# Patient Record
Sex: FEMALE | Race: BLACK OR AFRICAN AMERICAN | Employment: UNEMPLOYED | URBAN - METROPOLITAN AREA
[De-identification: names, ages, dates, MRNs, and addresses within clinical notes are randomized per-mention and may not be internally consistent; named-entity substitution may affect disease eponyms.]

---

## 2019-09-23 ENCOUNTER — OFFICE VISIT (OUTPATIENT)
Dept: FAMILY MEDICINE CLINIC | Age: 74
End: 2019-09-23

## 2019-09-23 VITALS
HEART RATE: 93 BPM | BODY MASS INDEX: 29.37 KG/M2 | TEMPERATURE: 98.2 F | HEIGHT: 64 IN | SYSTOLIC BLOOD PRESSURE: 144 MMHG | DIASTOLIC BLOOD PRESSURE: 69 MMHG | RESPIRATION RATE: 18 BRPM | OXYGEN SATURATION: 96 % | WEIGHT: 172 LBS

## 2019-09-23 DIAGNOSIS — G20 PARKINSON'S DISEASE (HCC): Primary | ICD-10-CM

## 2019-09-23 DIAGNOSIS — E78.5 DYSLIPIDEMIA: ICD-10-CM

## 2019-09-23 DIAGNOSIS — M19.90 ARTHRITIS: ICD-10-CM

## 2019-09-23 DIAGNOSIS — F39 MOOD DISORDER (HCC): ICD-10-CM

## 2019-09-23 DIAGNOSIS — Z00.00 ENCOUNTER FOR MEDICARE ANNUAL WELLNESS EXAM: ICD-10-CM

## 2019-09-23 DIAGNOSIS — I10 ESSENTIAL HYPERTENSION: ICD-10-CM

## 2019-09-23 DIAGNOSIS — Z71.89 ACP (ADVANCE CARE PLANNING): ICD-10-CM

## 2019-09-23 DIAGNOSIS — Z23 ENCOUNTER FOR IMMUNIZATION: ICD-10-CM

## 2019-09-23 DIAGNOSIS — E66.3 OVERWEIGHT (BMI 25.0-29.9): ICD-10-CM

## 2019-09-23 RX ORDER — ATORVASTATIN CALCIUM 10 MG/1
TABLET, FILM COATED ORAL DAILY
COMMUNITY
End: 2020-02-06 | Stop reason: SDUPTHER

## 2019-09-23 RX ORDER — CARBIDOPA AND LEVODOPA 25; 100 MG/1; MG/1
1 TABLET ORAL 3 TIMES DAILY
COMMUNITY
End: 2022-09-20

## 2019-09-23 RX ORDER — HYDROCHLOROTHIAZIDE 25 MG/1
25 TABLET ORAL DAILY
COMMUNITY
End: 2020-04-22 | Stop reason: SDUPTHER

## 2019-09-23 NOTE — PROGRESS NOTES
Chief Complaint   Patient presents with   [facility] Establish Care     1. Have you been to the ER, urgent care clinic since your last visit? Hospitalized since your last visit? No    2. Have you seen or consulted any other health care providers outside of the [address] [place] [address] since your last visit? Include any pap smears or colon screening. No    This is an Initial Medicare Annual Wellness Exam (AWV) (Performed 12 months after IPPE or effective date of Medicare Part B enrollment, Once in a lifetime)    I have reviewed the patient's medical history in detail and updated the computerized patient record. History   Terry Monique for [facility] wellness exam.  Comes in  Past Medical History:   Diagnosis Date    Hypertension     Parkinson disease [facility])       Past Surgical History:   Procedure Laterality Date    HX CATARACT REMOVAL      HX COLONOSCOPY  2019    HX HYSTERECTOMY  1990    HX SMALL BOWEL RESECTION  2008     Current Outpatient Medications   Medication Sig Dispense Refill    atorvastatin (LIPITOR) 10 mg tablet Take  by mouth daily.  hydroCHLOROthiazide (HYDRODIURIL) 25 mg tablet Take 25 mg by mouth daily.  carbidopa-levodopa (SINEMET)  mg per tablet Take 1 Tab by mouth three (3) times daily. No Known Allergies  No family history on file. Social History     Tobacco Use    Smoking status: Never Smoker    Smokeless tobacco: Never Used   Substance Use Topics    Alcohol use: Never     Frequency: Never     There is no problem list on file for this patient. Depression Risk Factor Screening:     3 most recent PHQ Screens 9/23/2019   Little interest or pleasure in doing things Not at all   Feeling down, depressed, irritable, or hopeless Not at all   Total Score PHQ 2 0     Alcohol Risk Factor Screening: You do not drink alcohol or very rarely. Functional Ability and Level of Safety:   1. Was the patient's timed Up and GO test unsteady or longer than 30 seconds? Yes  2.  Does the patient need help with the phone, transportation, shopping, preparing meals, housework, laundry, medications or managing money? No  3. Does the patients' home have rugs in the hallway, lack grab bars in the bathroom, lack handrails on the stairs or have poor lighting? No  4. Have you noticed any hearing difficulties? No  Hearing Evaluation:    Hearing Loss  Hearing is good. Activities of Daily Living  The home contains: no safety equipment. Patient does total self care    Fall Risk  Fall Risk Assessment, last 12 mths 9/23/2019   Able to walk? Yes   Fall in past 12 months? Yes   Number of falls in past 12 months 3       Abuse Screen  Patient is not abused    Cognitive Screening   Evaluation of Cognitive Function:  Has your family/caregiver stated any concerns about your memory: no  Normal    Patient Care Team   Patient Care Team:  Willian Beck MD as PCP - General (Family Practice)    Assessment/Plan   Education and counseling provided:  End-of-Life planning (with patient's consent)    1. Encounter for Medicare annual wellness exam    2. ACP (advance care planning)    3. Encounter for immunization  - PNEUMOCOCCAL CONJ VACCINE 13 VALENT IM  - [address] Maintenance Due   Topic Date Due    Hepatitis C Screening  1945    DTaP/Tdap/Td series (1 - Tdap) 03/22/1967    Shingrix Vaccine Age 50> (1 of 2) 03/22/1996    BREAST CANCER SCRN MAMMOGRAM  03/22/1996    FOBT Q 1 YEAR AGE 50-75  03/22/1996    GLAUCOMA SCREENING Q2Y  03/22/2011    Bone Densitometry (Dexa) Screening  03/22/2011    Pneumococcal 65+ years (1 of 2 - PCV13) 03/22/2011    Influenza Age 5 to Adult  08/01/2019     I have discussed the diagnosis with the patient and the intended plan of care as seen in the above orders. The patient has received an after-visit summary and questions were answered concerning future plans. I have discussed medication, side effects, and warnings with the patient in detail.  The patient verbalized understanding and is in agreement with the plan of care. The patient will contact the office with any additional concerns. Personalized preventative plan of care was discussed, printed and given to patient.     Bairon Vasquez MD

## 2019-09-23 NOTE — PROGRESS NOTES
HPI  Rafa Hartman comes in to establish care. Parkinson's disease: she is f/u by the neurologist. Patient noted to have shuffling gait and abnormal mobility. Seen by Dr Jacqueline Ballard. She is on Sinemet. Continue current treatment plan. Arthritis: affects both knees, she has had physical therapy for both knees. Continue current treatment plan. Dyslipidemia: she is on atorvastatin. Stable. Tolerating the medication. We will check labs. Anxiety: she is on medication for anxiety. She is on citalopram.  Continue current medication. HTN: She is on HCTZ. Stable. Continue current treatment plan. Will request previous labs. She has labs done 2 weeks ago, we will request the report. She had colonoscopy done this year, Mammogram too. She has had eye surgery and is f/u at [facility]. She will get pneumococcal vaccine today. Overweight: Patient is a BMI of 29.52. She will intensify lifestyle and dietary modification. Past Medical History  Past Medical History:   Diagnosis Date    Hypertension     Parkinson disease [facility])        Surgical History  Past Surgical History:   Procedure Laterality Date    HX CATARACT REMOVAL      HX COLONOSCOPY  2019    HX HYSTERECTOMY  1990    HX SMALL BOWEL RESECTION  2008        Medications  Current Outpatient Medications   Medication Sig Dispense Refill    atorvastatin (LIPITOR) 10 mg tablet Take  by mouth daily.  hydroCHLOROthiazide (HYDRODIURIL) 25 mg tablet Take 25 mg by mouth daily.  carbidopa-levodopa (SINEMET)  mg per tablet Take 1 Tab by mouth three (3) times daily. Allergies  No Known Allergies    Family History  History reviewed. No pertinent family history.     Social History  Social History     Socioeconomic History    Marital status:      Spouse name: Not on file    Number of children: Not on file    Years of education: Not on file    Highest education level: Not on file   Occupational History    Not on file   Social Needs    Financial resource strain: Not on file    Food insecurity:     Worry: Not on file     Inability: Not on file    Transportation needs:     Medical: Not on file     Non-medical: Not on file   Tobacco Use    Smoking status: Never Smoker    Smokeless tobacco: Never Used   Substance and Sexual Activity    Alcohol use: Never     Frequency: Never    Drug use: Never    Sexual activity: Not on file   Lifestyle    Physical activity:     Days per week: Not on file     Minutes per session: Not on file    Stress: Not on file   Relationships    Social connections:     Talks on phone: Not on file     Gets together: Not on file     Attends Anabaptist service: Not on file     Active member of club or organization: Not on file     Attends meetings of clubs or organizations: Not on file     Relationship status: Not on file    Intimate partner violence:     Fear of current or ex partner: Not on file     Emotionally abused: Not on file     Physically abused: Not on file     Forced sexual activity: Not on file   Other Topics Concern    Not on file   Social History Narrative    Not on file       Review of Systems  Review of Systems - Review of all systems is negative except as noted above in the HPI. Vital Signs  Visit Vitals  /69 (BP 1 Location: Left arm, BP Patient Position: Sitting)   Pulse 93   Temp 98.2 °F (36.8 °C) (Oral)   Resp 18   Ht 5' 4\" (1.626 m)   Wt 172 lb (78 kg)   SpO2 96%   BMI 29.52 kg/m²         Physical Exam  Physical Examination: General appearance - oriented to person, place, and time and acyanotic, in no respiratory distress  Mental status - affect appropriate to mood  Eyes - sclera anicteric  Mouth - mucous membranes moist, pharynx normal without lesions  Neck - supple, no significant adenopathy  Lymphatics - no palpable lymphadenopathy  Chest - decreased air entry noted lung bases.   Heart - S1 and S2 normal  Abdomen - no rebound tenderness noted  Back exam - limited range of motion, pain with motion noted during exam  Neurological -shuffling gait with occasional tremor  Musculoskeletal - osteoarthritic changes noted in both hands  Extremities - intact peripheral pulses    Results  No results found for this or any previous visit. ASSESSMENT and PLAN    ICD-10-CM ICD-9-CM    1. Parkinson's disease ([facility] [address] G20 332.0    2. Arthritis M19.90 716.90    3. Dyslipidemia E78.5 272.4    4. Mood disorder (HCC) F39 296.90    5. Essential hypertension I10 401.9    6. Overweight (BMI 25.0-29. 9) E66.3 278.02    7. Encounter for Medicare annual wellness exam Z00.00 V70.0    8. ACP (advance care planning) Z71.89 V65.49    9. Encounter for immunization Z23 V03.89 PNEUMOCOCCAL CONJ VACCINE 13 VALENT IM      ADMIN PNEUMOCOCCAL VACCINE   Discussed the patient's BMI with her. The BMI follow up plan is as follows:     dietary management education, guidance, and counseling  encourage exercise  monitor weight  lab results and schedule of future lab studies reviewed with patient  reviewed diet, exercise and weight control  cardiovascular risk and specific lipid/LDL goals reviewed  reviewed medications and side effects in detail  use of aspirin to prevent MI and TIA's discussed      I have discussed the diagnosis with the patient and the intended plan of care as seen in the above orders. The patient has received an after-visit summary and questions were answered concerning future plans. I have discussed medication, side effects, and warnings with the patient in detail. The patient verbalized understanding and is in agreement with the plan of care. The patient will contact the office with any additional concerns. Donita Wong MD    PLEASE NOTE:   This document has been produced using voice recognition software.  Unrecognized errors in transcription may be present

## 2019-09-23 NOTE — PATIENT INSTRUCTIONS
Medicare Part B Preventive Services Limitations Recommendation Scheduled   Bone Mass Measurement  (age 72 & older, biennial) A bone mass density test is recommended when a woman turns 65 to screen for osteoporosis. This test is only recommended one time, as a screening. Some providers will use this same test as a disease monitoring tool if you already have osteoporosis. Due     Cardiovascular Screening Blood Tests (every 5 years)  Total cholesterol, HDL, Triglycerides and ECG Order blood work  as a panel if possible and adults with routine risk  an electrocardiogram (ECG) at intervals determined by your doctor. Due     Colorectal Cancer Screening  -Fecal occult blood test (annual)  -Flexible sigmoidoscopy (5y)  -Screening colonoscopy (10y)  -Barium Enema Colorectal cancer screenings should be done for adults age 54-65 with no increased risk factors for colorectal cancer. There are a number of acceptable methods of screening for this type of cancer. Each test has its own benefits and drawbacks. Discuss with your doctor what is most appropriate for you during your annual wellness visit. The different tests include: colonoscopy (considered the best screening method), a fecal occult blood test, a fecal DNA test, and sigmoidoscopy.  utd     Counseling to Prevent Tobacco Use (up to 8 sessions per year)  - Counseling greater than 3 and up to 10 minutes  - Counseling greater than 10 minutes Patients must be asymptomatic of tobacco-related conditions to receive as preventive service n/a    Diabetes Screening Tests (at least every 3 years, Medicare covers annually or at 6-month intervals for prediabetic patients)    Fasting blood sugar (FBS) or glucose tolerance test (GTT) -All adults age 38-68 who are overweight should have a diabetes screening test once every three years.   -Other screening tests and preventive services for persons with diabetes include: an eye exam to screen for diabetic retinopathy, a kidney function test, a foot exam, and stricter control over your cholesterol. n/a    Diabetes Self-Management Training (DSMT) (no USPSTF recommendation) Requires referral by treating physician for patient with diabetes or renal disease. 10 hours of initial DSMT session of no less than 30 minutes each in a continuous 12-month period. 2 hours of follow-up DSMT in subsequent years. n/a    Glaucoma Screening (no USPSTF recommendation) Diabetes mellitus, family history, , age 48 or over,  American, age 72 or over n/a    Human Immunodeficiency Virus (HIV) Screening (annually for increased risk patients)  HIV-1 and HIV-2 by EIA, SHANT, rapid antibody test, or oral mucosa transudate Patient must be at increased risk for HIV infection per USPSTF guidelines or pregnant. Tests covered annually for patients at increased risk. Pregnant patients may receive up to 3 test during pregnancy. N/a     Medical Nutrition Therapy (MNT) (for diabetes or renal disease not recommended schedule) Requires referral by treating physician for patient with diabetes or renal disease. Can be provided in same year as diabetes self-management training (DSMT), and CMS recommends medical nutrition therapy take place after DSMT. Up to 3 hours for initial year and 2 hours in subsequent years. N/a     Shingles Vaccination A shingles vaccine is also recommended once in a lifetime after age 61 Due     Seasonal Influenza Vaccination (annually) All adults should have a flu vaccine yearly  Due     Pneumococcal Vaccination (once after 72) All adults over the age of 72 should receive the recommended pneumonia vaccines. Current USPSTF guidelines recommend a series of two vaccines for the best pneumonia protection.   Due    Hepatitis B Vaccinations (if medium/high risk) Medium/high risk factors:  End-stage renal disease,  Hemophiliacs who received Factor VIII or IX concentrates, Clients of institutions for the mentally retarded, Persons who live in the same house as a HepB virus carrier, Homosexual men, Illicit injectable drug abusers. n/a    Screening Mammography (biennial age 54-69) Breast cancer screenings are recommended every other year for women of normal risk, age 54-69. Patient states she had done at [address] Pap Tests and Pelvic Examination (up to age 79 and after 79 if unknown history or abnormal study last 10 years) Cervical cancer screenings for women over age 72 are only recommended with certain risk factors n/a    Hepatitis C All adults born between 80 and 1965 should be screened once  N/a      Tetanus  All adults should have a tetanus vaccine every 10 years N/a                  Body Mass Index: Care Instructions  Your Care Instructions    Body mass index (BMI) can help you see if your weight is raising your risk for health problems. It uses a formula to compare how much you weigh with how tall you are. · A BMI lower than 18.5 is considered underweight. · A BMI between 18.5 and 24.9 is considered healthy. · A BMI between 25 and 29.9 is considered overweight. A BMI of 30 or higher is considered obese. If your BMI is in the normal range, it means that you have a lower risk for weight-related health problems. If your BMI is in the overweight or obese range, you may be at increased risk for weight-related health problems, such as high blood pressure, heart disease, stroke, arthritis or joint pain, and diabetes. If your BMI is in the underweight range, you may be at increased risk for health problems such as fatigue, lower protection (immunity) against illness, muscle loss, bone loss, hair loss, and hormone problems. BMI is just one measure of your risk for weight-related health problems. You may be at higher risk for health problems if you are not active, you eat an unhealthy diet, or you drink too much alcohol or use tobacco products. Follow-up care is a key part of your treatment and safety.  Be sure to make and go to all appointments, and call your doctor if you are having problems. It's also a good idea to know your test results and keep a list of the medicines you take. How can you care for yourself at home? · Practice healthy eating habits. This includes eating plenty of fruits, vegetables, whole grains, lean protein, and low-fat dairy. · If your doctor recommends it, get more exercise. Walking is a good choice. Bit by bit, increase the amount you walk every day. Try for at least 30 minutes on most days of the week. · Do not smoke. Smoking can increase your risk for health problems. If you need help quitting, talk to your doctor about stop-smoking programs and medicines. These can increase your chances of quitting for good. · Limit alcohol to 2 drinks a day for men and 1 drink a day for women. Too much alcohol can cause health problems. If you have a BMI higher than 25  · Your doctor may do other tests to check your risk for weight-related health problems. This may include measuring the distance around your waist. A waist measurement of more than 40 inches in men or 35 inches in women can increase the risk of weight-related health problems. · Talk with your doctor about steps you can take to stay healthy or improve your health. You may need to make lifestyle changes to lose weight and stay healthy, such as changing your diet and getting regular exercise. If you have a BMI lower than 18.5  · Your doctor may do other tests to check your risk for health problems. · Talk with your doctor about steps you can take to stay healthy or improve your health. You may need to make lifestyle changes to gain or maintain weight and stay healthy, such as getting more healthy foods in your diet and doing exercises to build muscle. Where can you learn more? Go to http://dana-toshia.info/. Enter S176 in the search box to learn more about \"Body Mass Index: Care Instructions. \"  Current as of: October 13, 2016  Content Version: 11.4  © 4687-6542 [employer]. Care instructions adapted under license by [employer] (which disclaims liability or warranty for this information). If you have questions about a medical condition or this instruction, always ask your healthcare professional. [address] any warranty or liability for your use of this information.

## 2019-09-29 NOTE — ACP (ADVANCE CARE PLANNING)
Advance Care Planning (ACP) Provider Note - Comprehensive     Date of ACP Conversation: 09/23/19  Persons included in Conversation:  patient  Length of ACP Conversation in minutes:  16 minutes    Authorized Decision Maker (if patient is incapable of making informed decisions): This person is:  Patient will take forms given, fill this out and bring this back for scanning into the EMR chart. General ACP for ALL Patients with Decision Making Capacity:   Importance of advance care planning, including choosing a healthcare agent to communicate patient's healthcare decisions if patient lost the ability to make decisions, such as after a sudden illness or accident  Understanding of the healthcare agent role was assessed and information provided  Exploration of values, goals, and preferences if recovery is not expected, even with continued medical treatment in the event of: Imminent death  Severe, permanent brain injury  \"In these circumstances, what matters most to you? \"  Care focused more on comfort or quality of life.   Opportunity offered to explore how cultural, Druze, spiritual, or personal beliefs would affect decisions for future care     Interventions Provided:  Recommended completion of Advance Directive form after review of ACP materials and conversation with prospective healthcare agent      Telma Cade MD

## 2019-11-25 ENCOUNTER — OFFICE VISIT (OUTPATIENT)
Dept: FAMILY MEDICINE CLINIC | Age: 74
End: 2019-11-25

## 2019-11-25 ENCOUNTER — HOSPITAL ENCOUNTER (OUTPATIENT)
Dept: LAB | Age: 74
Discharge: HOME | End: 2019-11-25
Payer: MEDICARE

## 2019-11-25 VITALS
BODY MASS INDEX: 30.22 KG/M2 | HEIGHT: 64 IN | TEMPERATURE: 98.5 F | WEIGHT: 177 LBS | SYSTOLIC BLOOD PRESSURE: 135 MMHG | HEART RATE: 85 BPM | OXYGEN SATURATION: 100 % | DIASTOLIC BLOOD PRESSURE: 83 MMHG | RESPIRATION RATE: 16 BRPM

## 2019-11-25 DIAGNOSIS — Z01.89 ENCOUNTER FOR LABORATORY EXAMINATION: ICD-10-CM

## 2019-11-25 DIAGNOSIS — Z11.59 NEED FOR HEPATITIS C SCREENING TEST: ICD-10-CM

## 2019-11-25 DIAGNOSIS — E78.5 DYSLIPIDEMIA: ICD-10-CM

## 2019-11-25 DIAGNOSIS — M81.0 POST-MENOPAUSAL OSTEOPOROSIS: ICD-10-CM

## 2019-11-25 DIAGNOSIS — I10 ESSENTIAL HYPERTENSION: ICD-10-CM

## 2019-11-25 DIAGNOSIS — G20 PARKINSON'S DISEASE (HCC): Primary | ICD-10-CM

## 2019-11-25 PROCEDURE — 86803 HEPATITIS C AB TEST: CPT

## 2019-11-25 NOTE — PROGRESS NOTES
Chief Complaint   Patient presents with    Hypertension    Follow Up Chronic Condition     Parkinson's Disease     1. Have you been to the ER, urgent care clinic since your last visit? Hospitalized since your last visit? No    2. Have you seen or consulted any other health care providers outside of the [address] [place] [address] since your last visit? Include any pap smears or colon screening. No     HPI  Pepe Lee comes in brought by the daughter for follow-up care. Patient has Parkinson's disease. She is on Sinemet. Stable on the medication. Denies tremors. Will continue with the current treatment plan. She has hypertension. Her blood pressure today stable. She denies headache, changes in vision or focal weakness. She is on HCTZ 25 mg daily. Continue current treatment plan. Patient takes Lipitor for dyslipidemia. This is stable. We will check hepatitis C screening today. We will also refer patient for bone density scan. Patient had a colonoscopy and has had glaucoma screening done. We will request the records. I will follow-up at next visit. Past Medical History  Past Medical History:   Diagnosis Date    Hypertension     Parkinson disease [facility])        Surgical History  Past Surgical History:   Procedure Laterality Date    HX CATARACT REMOVAL      HX COLONOSCOPY  2019    HX HYSTERECTOMY  1990    HX SMALL BOWEL RESECTION  2008        Medications  Current Outpatient Medications   Medication Sig Dispense Refill    atorvastatin (LIPITOR) 10 mg tablet Take  by mouth daily.  hydroCHLOROthiazide (HYDRODIURIL) 25 mg tablet Take 25 mg by mouth daily.  carbidopa-levodopa (SINEMET)  mg per tablet Take 1 Tab by mouth three (3) times daily. Allergies  No Known Allergies    Family History  History reviewed. No pertinent family history.     Social History  Social History     Socioeconomic History    Marital status:      Spouse name: Not on file    Number of children: Not on file    Years of education: Not on file    Highest education level: Not on file   Occupational History    Not on file   Social Needs    Financial resource strain: Not on file    Food insecurity:     Worry: Not on file     Inability: Not on file    Transportation needs:     Medical: Not on file     Non-medical: Not on file   Tobacco Use    Smoking status: Never Smoker    Smokeless tobacco: Never Used   Substance and Sexual Activity    Alcohol use: Never     Frequency: Never    Drug use: Never    Sexual activity: Not on file   Lifestyle    Physical activity:     Days per week: Not on file     Minutes per session: Not on file    Stress: Not on file   Relationships    Social connections:     Talks on phone: Not on file     Gets together: Not on file     Attends Evangelical service: Not on file     Active member of club or organization: Not on file     Attends meetings of clubs or organizations: Not on file     Relationship status: Not on file    Intimate partner violence:     Fear of current or ex partner: Not on file     Emotionally abused: Not on file     Physically abused: Not on file     Forced sexual activity: Not on file   Other Topics Concern    Not on file   Social History Narrative    Not on file       Review of Systems  Review of Systems - Review of all systems is negative except as noted above in the HPI.     Vital Signs  Visit Vitals  /83 (BP 1 Location: Left arm, BP Patient Position: Sitting)   Pulse 85   Temp 98.5 °F (36.9 °C) (Oral)   Resp 16   Ht 5' 4\" (1.626 m)   Wt 177 lb (80.3 kg)   SpO2 100%   BMI 30.38 kg/m²         Physical Exam  Physical Examination: General appearance - alert, well appearing, and in no distress and acyanotic, in no respiratory distress  Mental status - alert, oriented to person, place, and time  Neck - supple, no significant adenopathy  Lymphatics - no palpable lymphadenopathy, no hepatosplenomegaly  Chest - clear to auscultation, no wheezes, rales or rhonchi, symmetric air entry  Heart - normal rate, regular rhythm, normal S1, S2, no murmurs, rubs, clicks or gallops  Abdomen - no rebound tenderness noted  Neurological - abnormal neurological exam unchanged from prior examinations  Musculoskeletal - osteoarthritic changes noted in both hands  Extremities - no pedal edema noted    Results  No results found for this or any previous visit. ASSESSMENT and PLAN    ICD-10-CM ICD-9-CM    1. Parkinson's disease ([facility] [address] G20 332.0    2. Essential hypertension I10 401.9    3. Dyslipidemia E78.5 272.4    4. Need for hepatitis C screening test Z11.59 V73.89 HEPATITIS C AB   5. Post-menopausal osteoporosis M81.0 733.01 DEXA BONE DENSITY STUDY AXIAL   6. Encounter for laboratory examination Z01.89 V72.60 COLLECTION VENOUS BLOOD,VENIPUNCTURE     lab results and schedule of future lab studies reviewed with patient  reviewed diet, exercise and weight control  cardiovascular risk and specific lipid/LDL goals reviewed  reviewed medications and side effects in detail    I have discussed the diagnosis with the patient and the intended plan of care as seen in the above orders. The patient has received an after-visit summary and questions were answered concerning future plans. I have discussed medication, side effects, and warnings with the patient in detail. The patient verbalized understanding and is in agreement with the plan of care. The patient will contact the office with any additional concerns. Gumaro Frost MD    PLEASE NOTE:   This document has been produced using voice recognition software.  Unrecognized errors in transcription may be present

## 2019-11-26 LAB
HCV AB SER IA-ACNC: 0.16 INDEX
HCV AB SERPL QL IA: NEGATIVE
HCV COMMENT,HCGAC: NORMAL

## 2020-01-17 ENCOUNTER — TELEPHONE (OUTPATIENT)
Dept: FAMILY MEDICINE CLINIC | Age: 75
End: 2020-01-17

## 2020-01-17 NOTE — TELEPHONE ENCOUNTER
Patient's daughter is requesting Dr Yana Manzano to write a letter stating that Ms. Thompson has parkinson's disease and she is making irrational decisions as far as financial concerns, she needs to show proof to the patient's bank. [phone] Please contact the daughter Kayode Pierson) to discuss this matter when available

## 2020-01-20 NOTE — TELEPHONE ENCOUNTER
Please let patients daughter know that patient has been seen by the neurologist for Parkinson's disease. She may be able to get a letter stating that patient has Parkinson's disease but this has nothing to do with making irrational decisions. Patient is making irrational decisions then she needs to be evaluated for that. If patient is willing I can refer her to the neuropsychologist to have that evaluated or they can talk to the neurologist about it.     Diane Braxton MD

## 2020-01-22 NOTE — TELEPHONE ENCOUNTER
Spoke with Ms. Lorrie Henderson at this time. Informed her of recommendation at this time. Ms.Valenzuela verbalized understanding and states she will get back with us regarding neuropsychologist referral

## 2020-02-05 RX ORDER — ATORVASTATIN CALCIUM 10 MG/1
TABLET, FILM COATED ORAL DAILY
Qty: 30 TAB | Status: CANCELLED | OUTPATIENT
Start: 2020-02-05

## 2020-02-05 NOTE — TELEPHONE ENCOUNTER
Patient's daughter called requesting refill on Amitryptiline 25mg. I did not see this on her list.     Requested Prescriptions     Pending Prescriptions Disp Refills    atorvastatin (LIPITOR) 10 mg tablet 30 Tab      Sig: Take  by mouth daily.

## 2020-02-06 RX ORDER — ATORVASTATIN CALCIUM 10 MG/1
10 TABLET, FILM COATED ORAL DAILY
Qty: 30 TAB | Refills: 3 | Status: SHIPPED | OUTPATIENT
Start: 2020-02-06 | End: 2022-08-08 | Stop reason: SDUPTHER

## 2020-02-06 NOTE — TELEPHONE ENCOUNTER
Patient was getting medication fill by her previous doctor.  Medication was prescribe 25 mg once a day

## 2020-02-06 NOTE — TELEPHONE ENCOUNTER
Please find out how much amitriptyline the patient was using and how often and we will send in a prescription for this.   Also find out who had prescribed this for the patient because we do not have this on her medication list.  Roslynn Kehr, MD

## 2020-02-10 RX ORDER — AMITRIPTYLINE HYDROCHLORIDE 25 MG/1
25 TABLET, FILM COATED ORAL
Qty: 30 TAB | Refills: 1 | Status: SHIPPED | OUTPATIENT
Start: 2020-02-10 | End: 2020-03-10 | Stop reason: SDUPTHER

## 2020-03-04 NOTE — TELEPHONE ENCOUNTER
[facility]    90 DAYS SUPPLY; REQUEST FOR AUTHORIZATION    AMITRIPTYLINE HCL 25MG TABLETS  QTY: 90.0  TAKE 1 TABLET BY MOUTH EVERY SALVATORE AT NIGHT

## 2020-03-10 RX ORDER — AMITRIPTYLINE HYDROCHLORIDE 25 MG/1
25 TABLET, FILM COATED ORAL
Qty: 90 TAB | Refills: 1 | Status: SHIPPED | OUTPATIENT
Start: 2020-03-10 | End: 2020-09-23 | Stop reason: ALTCHOICE

## 2020-04-22 RX ORDER — HYDROCHLOROTHIAZIDE 25 MG/1
25 TABLET ORAL DAILY
Qty: 90 TAB | Refills: 0 | Status: SHIPPED | OUTPATIENT
Start: 2020-04-22 | End: 2020-07-16

## 2020-04-22 NOTE — TELEPHONE ENCOUNTER
Requested Prescriptions     Pending Prescriptions Disp Refills    hydroCHLOROthiazide (HYDRODIURIL) 25 mg tablet       Sig: Take 1 Tab by mouth daily.

## 2020-07-16 RX ORDER — HYDROCHLOROTHIAZIDE 25 MG/1
TABLET ORAL
Qty: 90 TAB | Refills: 0 | Status: SHIPPED | OUTPATIENT
Start: 2020-07-16 | End: 2020-10-11

## 2020-09-23 ENCOUNTER — OFFICE VISIT (OUTPATIENT)
Dept: FAMILY MEDICINE CLINIC | Age: 75
End: 2020-09-23
Payer: MEDICARE

## 2020-09-23 ENCOUNTER — HOSPITAL ENCOUNTER (OUTPATIENT)
Dept: LAB | Age: 75
Discharge: HOME | End: 2020-09-23
Payer: MEDICARE

## 2020-09-23 VITALS
RESPIRATION RATE: 18 BRPM | WEIGHT: 139.4 LBS | BODY MASS INDEX: 23.8 KG/M2 | DIASTOLIC BLOOD PRESSURE: 67 MMHG | HEIGHT: 64 IN | TEMPERATURE: 99.5 F | HEART RATE: 98 BPM | SYSTOLIC BLOOD PRESSURE: 121 MMHG

## 2020-09-23 DIAGNOSIS — G20 PARKINSON'S DISEASE (HCC): Primary | ICD-10-CM

## 2020-09-23 DIAGNOSIS — E07.9 THYROID DISORDER: ICD-10-CM

## 2020-09-23 DIAGNOSIS — I10 ESSENTIAL HYPERTENSION: ICD-10-CM

## 2020-09-23 DIAGNOSIS — E78.5 DYSLIPIDEMIA: ICD-10-CM

## 2020-09-23 DIAGNOSIS — R11.2 NAUSEA AND VOMITING, INTRACTABILITY OF VOMITING NOT SPECIFIED, UNSPECIFIED VOMITING TYPE: ICD-10-CM

## 2020-09-23 DIAGNOSIS — F39 MOOD DISORDER (HCC): ICD-10-CM

## 2020-09-23 DIAGNOSIS — Z23 ENCOUNTER FOR IMMUNIZATION: ICD-10-CM

## 2020-09-23 DIAGNOSIS — R63.0 POOR APPETITE: ICD-10-CM

## 2020-09-23 DIAGNOSIS — R73.9 HYPERGLYCEMIA: ICD-10-CM

## 2020-09-23 LAB
ALBUMIN SERPL-MCNC: 3.3 G/DL (ref 3.4–5)
ALBUMIN/GLOB SERPL: 0.9 {RATIO} (ref 0.8–1.7)
ALP SERPL-CCNC: 67 U/L (ref 45–117)
ALT SERPL-CCNC: 19 U/L (ref 13–56)
ANION GAP SERPL CALC-SCNC: 7 MMOL/L (ref 3–18)
AST SERPL-CCNC: 11 U/L (ref 10–38)
BASOPHILS # BLD: 0 K/UL (ref 0–0.1)
BASOPHILS NFR BLD: 0 % (ref 0–2)
BILIRUB SERPL-MCNC: 0.6 MG/DL (ref 0.2–1)
BUN SERPL-MCNC: 11 MG/DL (ref 7–18)
BUN/CREAT SERPL: 11 (ref 12–20)
CALCIUM SERPL-MCNC: 9.5 MG/DL (ref 8.5–10.1)
CHLORIDE SERPL-SCNC: 103 MMOL/L (ref 100–111)
CHOLEST SERPL-MCNC: 148 MG/DL
CO2 SERPL-SCNC: 30 MMOL/L (ref 21–32)
CREAT SERPL-MCNC: 1 MG/DL (ref 0.6–1.3)
DIFFERENTIAL METHOD BLD: ABNORMAL
EOSINOPHIL # BLD: 0.2 K/UL (ref 0–0.4)
EOSINOPHIL NFR BLD: 2 % (ref 0–5)
ERYTHROCYTE [DISTWIDTH] IN BLOOD BY AUTOMATED COUNT: 14.4 % (ref 11.6–14.5)
GLOBULIN SER CALC-MCNC: 3.5 G/DL (ref 2–4)
GLUCOSE SERPL-MCNC: 130 MG/DL (ref 74–99)
HCT VFR BLD AUTO: 36.4 % (ref 35–45)
HDLC SERPL-MCNC: 56 MG/DL (ref 40–60)
HDLC SERPL: 2.6 {RATIO} (ref 0–5)
HGB BLD-MCNC: 11.6 G/DL (ref 12–16)
LDLC SERPL CALC-MCNC: 75.4 MG/DL (ref 0–100)
LIPASE SERPL-CCNC: 94 U/L (ref 73–393)
LIPID PROFILE,FLP: NORMAL
LYMPHOCYTES # BLD: 2.3 K/UL (ref 0.9–3.6)
LYMPHOCYTES NFR BLD: 30 % (ref 21–52)
MCH RBC QN AUTO: 26 PG (ref 24–34)
MCHC RBC AUTO-ENTMCNC: 31.9 G/DL (ref 31–37)
MCV RBC AUTO: 81.4 FL (ref 74–97)
MONOCYTES # BLD: 0.6 K/UL (ref 0.05–1.2)
MONOCYTES NFR BLD: 8 % (ref 3–10)
NEUTS SEG # BLD: 4.5 K/UL (ref 1.8–8)
NEUTS SEG NFR BLD: 60 % (ref 40–73)
PLATELET # BLD AUTO: 343 K/UL (ref 135–420)
PMV BLD AUTO: 11.8 FL (ref 9.2–11.8)
POTASSIUM SERPL-SCNC: 3 MMOL/L (ref 3.5–5.5)
PROT SERPL-MCNC: 6.8 G/DL (ref 6.4–8.2)
RBC # BLD AUTO: 4.47 M/UL (ref 4.2–5.3)
SODIUM SERPL-SCNC: 140 MMOL/L (ref 136–145)
TRIGL SERPL-MCNC: 83 MG/DL (ref ?–150)
TSH SERPL DL<=0.05 MIU/L-ACNC: 0.57 UIU/ML (ref 0.36–3.74)
VLDLC SERPL CALC-MCNC: 16.6 MG/DL
WBC # BLD AUTO: 7.6 K/UL (ref 4.6–13.2)

## 2020-09-23 PROCEDURE — 80053 COMPREHEN METABOLIC PANEL: CPT

## 2020-09-23 PROCEDURE — G8420 CALC BMI NORM PARAMETERS: HCPCS | Performed by: FAMILY MEDICINE

## 2020-09-23 PROCEDURE — 99215 OFFICE O/P EST HI 40 MIN: CPT | Performed by: FAMILY MEDICINE

## 2020-09-23 PROCEDURE — 85025 COMPLETE CBC W/AUTO DIFF WBC: CPT

## 2020-09-23 PROCEDURE — G9899 SCRN MAM PERF RSLTS DOC: HCPCS | Performed by: FAMILY MEDICINE

## 2020-09-23 PROCEDURE — G8510 SCR DEP NEG, NO PLAN REQD: HCPCS | Performed by: FAMILY MEDICINE

## 2020-09-23 PROCEDURE — G8400 PT W/DXA NO RESULTS DOC: HCPCS | Performed by: FAMILY MEDICINE

## 2020-09-23 PROCEDURE — 36415 COLL VENOUS BLD VENIPUNCTURE: CPT | Performed by: FAMILY MEDICINE

## 2020-09-23 PROCEDURE — G0008 ADMIN INFLUENZA VIRUS VAC: HCPCS | Performed by: FAMILY MEDICINE

## 2020-09-23 PROCEDURE — 3017F COLORECTAL CA SCREEN DOC REV: CPT | Performed by: FAMILY MEDICINE

## 2020-09-23 PROCEDURE — 84443 ASSAY THYROID STIM HORMONE: CPT

## 2020-09-23 PROCEDURE — 80061 LIPID PANEL: CPT

## 2020-09-23 PROCEDURE — G8536 NO DOC ELDER MAL SCRN: HCPCS | Performed by: FAMILY MEDICINE

## 2020-09-23 PROCEDURE — 1090F PRES/ABSN URINE INCON ASSESS: CPT | Performed by: FAMILY MEDICINE

## 2020-09-23 PROCEDURE — 90694 VACC AIIV4 NO PRSRV 0.5ML IM: CPT | Performed by: FAMILY MEDICINE

## 2020-09-23 PROCEDURE — 83690 ASSAY OF LIPASE: CPT

## 2020-09-23 PROCEDURE — 90732 PPSV23 VACC 2 YRS+ SUBQ/IM: CPT | Performed by: FAMILY MEDICINE

## 2020-09-23 PROCEDURE — G0009 ADMIN PNEUMOCOCCAL VACCINE: HCPCS | Performed by: FAMILY MEDICINE

## 2020-09-23 PROCEDURE — 3288F FALL RISK ASSESSMENT DOCD: CPT | Performed by: FAMILY MEDICINE

## 2020-09-23 PROCEDURE — G8427 DOCREV CUR MEDS BY ELIG CLIN: HCPCS | Performed by: FAMILY MEDICINE

## 2020-09-23 PROCEDURE — 1100F PTFALLS ASSESS-DOCD GE2>/YR: CPT | Performed by: FAMILY MEDICINE

## 2020-09-23 RX ORDER — MELATONIN
DAILY
COMMUNITY
End: 2022-09-08

## 2020-09-23 RX ORDER — MIRTAZAPINE 7.5 MG/1
7.5 TABLET, FILM COATED ORAL
Qty: 30 TAB | Refills: 2 | Status: SHIPPED | OUTPATIENT
Start: 2020-09-23 | End: 2020-10-07

## 2020-09-23 NOTE — PROGRESS NOTES
Venipuncture to patient forearm at this time. Patient tolerated well at this time. Labs ordered by PCP at this time. Injections given to patient at this time. Patient tolerated well

## 2020-09-23 NOTE — PROGRESS NOTES
Chief Complaint   Patient presents with    Decreased Appetite     1. Have you been to the ER, urgent care clinic since your last visit? Hospitalized since your last visit? No    2. Have you seen or consulted any other health care providers outside of the [address] [place] [address] since your last visit? Include any pap smears or colon screening. No     HPI  Conrad Marquez comes in for f/u care. Poor appetite: Patient has poor appetite. This has been ongoing for months. States that it is worse when she takes her Parkinson's medication. She also has nausea and vomiting. Denies abdominal pain or distention. She has lost weight. Patient has lost almost 40 pounds since she was last here. She has not been deliberately trying to lose weight. I will check labs. I will place a referral for her to be seen by the gastroenterologist due to the persistent nausea and the vomiting and weight loss. May need to have radiological studies done including CT scan of the abdomen. She denies fever, chills, night sweats. I will give mirtazapine to help with her appetite. This should also help with sleep. Patient has a history of colon polyps noted on a colonoscopy that was done last year. We will request the record. She denies heartburn or reflux symptoms. Insomnia: Patient has a history of insomnia. She has been on Elavil. This does help with sleep. I will put her on mirtazapine and we will have her hold off on the Elavil. I will follow-up in a month. Parkinson's disease: Patient has Parkinson's disease. She is on Sinemet daily. She wonders whether the dosage of medication is too much. Her neurologist has told her to take the medication as tolerated. I will have patient call the specialist to discuss adjustment in dosage of medication. Her tremors and Parkinson symptoms have been better controlled on the Sinemet. HTN: Patient has hypertension. Blood pressure is stable.   She denies headache, changes in vision or focal weakness. I will recheck labs today. I will follow-up as needed. We will continue with the current treatment plan. Patient takes HCTZ daily. Dyslipidemia: Patient has dyslipidemia. She is on Lipitor 10 mg daily. We will continue with the current treatment plan. Past Medical History  Past Medical History:   Diagnosis Date    Hypertension     Parkinson disease [facility])        Surgical History  Past Surgical History:   Procedure Laterality Date    HX CATARACT REMOVAL      HX COLONOSCOPY  2019    HX HYSTERECTOMY  1990    HX SMALL BOWEL RESECTION  2008        Medications  Current Outpatient Medications   Medication Sig Dispense Refill    cholecalciferol (Vitamin D3) (1000 Units /25 mcg) tablet Take  by mouth daily.  hydroCHLOROthiazide (HYDRODIURIL) 25 mg tablet TAKE 1 TABLET BY MOUTH EVERY DAY 90 Tab 0    amitriptyline (ELAVIL) 25 mg tablet Take 1 Tab by mouth nightly. 90 Tab 1    atorvastatin (LIPITOR) 10 mg tablet Take 1 Tab by mouth daily. 30 Tab 3    carbidopa-levodopa (SINEMET)  mg per tablet Take 1 Tab by mouth three (3) times daily. Allergies  No Known Allergies    Family History  No family history on file.     Social History  Social History     Socioeconomic History    Marital status:      Spouse name: Not on file    Number of children: Not on file    Years of education: Not on file    Highest education level: Not on file   Occupational History    Not on file   Social Needs    Financial resource strain: Not on file    Food insecurity     Worry: Not on file     Inability: Not on file    Transportation needs     Medical: Not on file     Non-medical: Not on file   Tobacco Use    Smoking status: Never Smoker    Smokeless tobacco: Never Used   Substance and Sexual Activity    Alcohol use: Never     Frequency: Never    Drug use: Never    Sexual activity: Not on file   Lifestyle    Physical activity     Days per week: Not on file     Minutes per session: Not on file    Stress: Not on file   Relationships    Social connections     Talks on phone: Not on file     Gets together: Not on file     Attends Alevism service: Not on file     Active member of club or organization: Not on file     Attends meetings of clubs or organizations: Not on file     Relationship status: Not on file    Intimate partner violence     Fear of current or ex partner: Not on file     Emotionally abused: Not on file     Physically abused: Not on file     Forced sexual activity: Not on file   Other Topics Concern    Not on file   Social History Narrative    Not on file       Review of Systems  Review of Systems - Review of all systems is negative except as noted above in the HPI.     Vital Signs  Visit Vitals  /67 (BP 1 Location: Left arm, BP Patient Position: Sitting)   Pulse 98   Temp 99.5 °F (37.5 °C) (Oral)   Resp 18   Ht 5' 4\" (1.626 m)   Wt 139 lb 6.4 oz (63.2 kg)   BMI 23.93 kg/m²         Physical Exam  Physical Examination: General appearance - oriented to person, place, and time, acyanotic, in no respiratory distress and chronically ill appearing  Mental status - alert, oriented to person, place, and time, affect appropriate to mood  Mouth - mucous membranes moist, pharynx normal without lesions  Neck - supple, no significant adenopathy  Lymphatics - no palpable lymphadenopathy, no hepatosplenomegaly  Chest - clear to auscultation, no wheezes, rales or rhonchi, symmetric air entry  Heart - S1 and S2 normal  Abdomen - no rebound tenderness noted  bowel sounds normal  Back exam - limited range of motion  Neurological - abnormal neurological exam unchanged from prior examinations  Musculoskeletal - osteoarthritic changes noted in both hands  Extremities - no pedal edema noted, intact peripheral pulses        Results  Results for orders placed or performed during the hospital encounter of 11/25/19   HEPATITIS C AB   Result Value Ref Range    Hepatitis C virus Ab 0.16 <0. 80 Index    Hep C virus Ab Interp. NEGATIVE  NEG      Hep C  virus Ab comment             ASSESSMENT and PLAN    ICD-10-CM ICD-9-CM    1. Parkinson's disease ([facility] [address]  G20 332.0    2. Essential hypertension  I10 401.9 CBC WITH AUTOMATED DIFF      METABOLIC PANEL, COMPREHENSIVE      COLLECTION VENOUS BLOOD,VENIPUNCTURE   3. Dyslipidemia  E78.5 272.4 LIPID PANEL      COLLECTION VENOUS BLOOD,VENIPUNCTURE   4. Mood disorder (HCC)  F39 296.90    5. Hyperglycemia  R73.9 790.29 COLLECTION VENOUS BLOOD,VENIPUNCTURE   6. Nausea and vomiting, intractability of vomiting not specified, unspecified vomiting type  R11.2 787.01 REFERRAL TO GASTROENTEROLOGY      LIPASE   7. Thyroid disorder  E07.9 246.9 TSH 3RD GENERATION      COLLECTION VENOUS BLOOD,VENIPUNCTURE   8. Poor appetite  R63.0 783.0 mirtazapine (REMERON) 7.5 mg tablet   9. Encounter for immunization  Z23 V03.89 FLU (FLUAD QUAD INFLUENZA VACCINE,QUAD,ADJUVANTED)      PNEUMOCOCCAL POLYSACCHARIDE VACCINE, 23-VALENT, ADULT OR IMMUNOSUPPRESSED PT DOSE,      ADMIN INFLUENZA VIRUS VAC      ADMIN PNEUMOCOCCAL VACCINE     lab results and schedule of future lab studies reviewed with patient  reviewed diet, exercise and weight control  cardiovascular risk and specific lipid/LDL goals reviewed  reviewed medications and side effects in detail      I have discussed the diagnosis with the patient and the intended plan of care as seen in the above orders. The patient has received an after-visit summary and questions were answered concerning future plans. I have discussed medication, side effects, and warnings with the patient in detail. The patient verbalized understanding and is in agreement with the plan of care. The patient will contact the office with any additional concerns. I spent at least 53 minutes on this visit with this established patient. Mark Arreola MD    PLEASE NOTE:   This document has been produced using voice recognition software.  Unrecognized errors in transcription may be present

## 2020-10-07 ENCOUNTER — OFFICE VISIT (OUTPATIENT)
Dept: FAMILY MEDICINE CLINIC | Age: 75
End: 2020-10-07
Payer: MEDICARE

## 2020-10-07 VITALS
RESPIRATION RATE: 18 BRPM | SYSTOLIC BLOOD PRESSURE: 112 MMHG | TEMPERATURE: 98.4 F | DIASTOLIC BLOOD PRESSURE: 68 MMHG | BODY MASS INDEX: 23.56 KG/M2 | WEIGHT: 138 LBS | OXYGEN SATURATION: 100 % | HEART RATE: 91 BPM | HEIGHT: 64 IN

## 2020-10-07 DIAGNOSIS — R73.9 HYPERGLYCEMIA: ICD-10-CM

## 2020-10-07 DIAGNOSIS — M81.0 POST-MENOPAUSAL OSTEOPOROSIS: ICD-10-CM

## 2020-10-07 DIAGNOSIS — I10 ESSENTIAL HYPERTENSION: ICD-10-CM

## 2020-10-07 DIAGNOSIS — G20 PARKINSON'S DISEASE (HCC): ICD-10-CM

## 2020-10-07 DIAGNOSIS — Z71.89 ACP (ADVANCE CARE PLANNING): ICD-10-CM

## 2020-10-07 DIAGNOSIS — Z00.00 ENCOUNTER FOR MEDICARE ANNUAL WELLNESS EXAM: ICD-10-CM

## 2020-10-07 DIAGNOSIS — F39 MOOD DISORDER (HCC): ICD-10-CM

## 2020-10-07 DIAGNOSIS — Z71.89 ADVANCED DIRECTIVES, COUNSELING/DISCUSSION: ICD-10-CM

## 2020-10-07 DIAGNOSIS — Z13.31 SCREENING FOR DEPRESSION: ICD-10-CM

## 2020-10-07 DIAGNOSIS — Z13.5 SCREENING FOR GLAUCOMA: ICD-10-CM

## 2020-10-07 DIAGNOSIS — E78.5 DYSLIPIDEMIA: ICD-10-CM

## 2020-10-07 DIAGNOSIS — R63.0 POOR APPETITE: Primary | ICD-10-CM

## 2020-10-07 LAB — HBA1C MFR BLD HPLC: 5.7 %

## 2020-10-07 PROCEDURE — G8510 SCR DEP NEG, NO PLAN REQD: HCPCS | Performed by: FAMILY MEDICINE

## 2020-10-07 PROCEDURE — 1090F PRES/ABSN URINE INCON ASSESS: CPT | Performed by: FAMILY MEDICINE

## 2020-10-07 PROCEDURE — 1101F PT FALLS ASSESS-DOCD LE1/YR: CPT | Performed by: FAMILY MEDICINE

## 2020-10-07 PROCEDURE — 83036 HEMOGLOBIN GLYCOSYLATED A1C: CPT | Performed by: FAMILY MEDICINE

## 2020-10-07 PROCEDURE — G0444 DEPRESSION SCREEN ANNUAL: HCPCS | Performed by: FAMILY MEDICINE

## 2020-10-07 PROCEDURE — G8420 CALC BMI NORM PARAMETERS: HCPCS | Performed by: FAMILY MEDICINE

## 2020-10-07 PROCEDURE — G8536 NO DOC ELDER MAL SCRN: HCPCS | Performed by: FAMILY MEDICINE

## 2020-10-07 PROCEDURE — 3017F COLORECTAL CA SCREEN DOC REV: CPT | Performed by: FAMILY MEDICINE

## 2020-10-07 PROCEDURE — G0439 PPPS, SUBSEQ VISIT: HCPCS | Performed by: FAMILY MEDICINE

## 2020-10-07 PROCEDURE — G9899 SCRN MAM PERF RSLTS DOC: HCPCS | Performed by: FAMILY MEDICINE

## 2020-10-07 PROCEDURE — 99497 ADVNCD CARE PLAN 30 MIN: CPT | Performed by: FAMILY MEDICINE

## 2020-10-07 PROCEDURE — 99214 OFFICE O/P EST MOD 30 MIN: CPT | Performed by: FAMILY MEDICINE

## 2020-10-07 PROCEDURE — G8427 DOCREV CUR MEDS BY ELIG CLIN: HCPCS | Performed by: FAMILY MEDICINE

## 2020-10-07 RX ORDER — MIRTAZAPINE 15 MG/1
15 TABLET, FILM COATED ORAL
Qty: 30 TAB | Refills: 2 | Status: SHIPPED | OUTPATIENT
Start: 2020-10-07 | End: 2020-10-29 | Stop reason: SDUPTHER

## 2020-10-07 NOTE — PROGRESS NOTES
Chief Complaint   Patient presents with   Omar De León Annual Wellness Visit    Labs     discuss results      1. Have you been to the ER, urgent care clinic since your last visit? Hospitalized since your last visit? No    2. Have you seen or consulted any other health care providers outside of the [address] [place] [address] since your last visit? Include any pap smears or colon screening. No  This is the Subsequent Medicare Annual Wellness Exam, performed 12 months or more after the Initial AWV or the last Subsequent AWV    I have reviewed the patient's medical history in detail and updated the computerized patient record. History   Rakel Gould is seen for Medicare wellness exam    There is no problem list on file for this patient. Past Medical History:   Diagnosis Date    Hypertension     Parkinson disease ([facility] [address]       Past Surgical History:   Procedure Laterality Date    HX CATARACT REMOVAL      HX COLONOSCOPY  2019    HX HYSTERECTOMY  1990    HX SMALL BOWEL RESECTION  2008     Current Outpatient Medications   Medication Sig Dispense Refill    cholecalciferol (Vitamin D3) (1000 Units /25 mcg) tablet Take  by mouth daily.  mirtazapine (REMERON) 7.5 mg tablet Take 1 Tab by mouth nightly. 30 Tab 2    hydroCHLOROthiazide (HYDRODIURIL) 25 mg tablet TAKE 1 TABLET BY MOUTH EVERY DAY 90 Tab 0    atorvastatin (LIPITOR) 10 mg tablet Take 1 Tab by mouth daily. 30 Tab 3    carbidopa-levodopa (SINEMET)  mg per tablet Take 1 Tab by mouth three (3) times daily. No Known Allergies    No family history on file.   Social History     Tobacco Use    Smoking status: Never Smoker    Smokeless tobacco: Never Used   Substance Use Topics    Alcohol use: Never     Frequency: Never       Depression Risk Factor Screening:     3 most recent PHQ Screens 10/7/2020   Little interest or pleasure in doing things Not at all   Feeling down, depressed, irritable, or hopeless Not at all   Total Score PHQ 2 0       Alcohol Risk Screen   Do you average more than 1 drink per night or more than 7 drinks a week:  No    On any one occasion in the past three months have you have had more than 3 drinks containing alcohol:  No        Functional Ability and Level of Safety:   1. Was the patient's timed Up and GO test unsteady or longer than 30 seconds? Yes  2. Does the patient need help with the phone, transportation, shopping, preparing meals, housework, laundry, medications or managing money? No  3. Does the patients' home have rugs in the hallway, lack grab bars in the bathroom, lack handrails on the stairs or have poor lighting? No  4. Have you noticed any hearing difficulties? No  Hearing Evaluation:    Hearing: Hearing is good. Activities of Daily Living: The home contains: no safety equipment. Patient does total self care     Ambulation: with difficulty, uses a cane     Fall Risk:  Fall Risk Assessment, last 12 mths 10/7/2020   Able to walk? Yes   Fall in past 12 months? No   Fall with injury? -   Number of falls in past 12 months -   Fall Risk Score -     Abuse Screen:  Patient is not abused       Cognitive Screening   Has your family/caregiver stated any concerns about your memory: no     Cognitive Screening: Normal - Verbal Fluency Test    Patient Care Team   Patient Care Team:  Joe Lira MD as PCP - General (Family Medicine)  Joe Lira MD as PCP - [facility] Empaneled Provider  Antonio Sears NP (Nurse Practitioner)    Assessment/Plan   Education and counseling provided:  Are appropriate based on today's review and evaluation  Screening for glaucoma    1. Encounter for Medicare annual wellness exam    2. Post-menopausal osteoporosis  - DEXA BONE DENSITY STUDY AXIAL; Future    3. Screening for glaucoma  - REFERRAL TO OPHTHALMOLOGY    4. ACP (advance care planning)  - ADVANCE CARE PLANNING FIRST 30 MINS    5.  Advanced directives, counseling/discussion  - ADVANCE CARE PLANNING FIRST 30 MINS  - FULL CODE    6. Screening for depression  - [address] Maintenance Due   Topic Date Due    DTaP/Tdap/Td series (1 - Tdap) 03/22/1967    FOBT Q1Y Age 50-75  03/22/1996    GLAUCOMA SCREENING Q2Y  03/22/2011    Bone Densitometry (Dexa) Screening  03/22/2011    Medicare Yearly Exam  09/23/2020     I have discussed the diagnosis with the patient and the intended plan of care as seen in the above orders. The patient has received an after-visit summary and questions were answered concerning future plans. I have discussed medication, side effects, and warnings with the patient in detail. The patient verbalized understanding and is in agreement with the plan of care. The patient will contact the office with any additional concerns. Personalized preventative plan of care was discussed, printed and given to patient.     Shyla Mcintosh MD

## 2020-10-07 NOTE — PATIENT INSTRUCTIONS
Medicare Wellness Visit, Female The best way to live healthy is to have a lifestyle where you eat a well-balanced diet, exercise regularly, limit alcohol use, and quit all forms of tobacco/nicotine, if applicable. Regular preventive services are another way to keep healthy. Preventive services (vaccines, screening tests, monitoring & exams) can help personalize your care plan, which helps you manage your own care. Screening tests can find health problems at the earliest stages, when they are easiest to treat. [facility] follows the current, evidence-based guidelines published by the [facility] ([facility]) when recommending preventive services for our patients. Because we follow these guidelines, sometimes recommendations change over time as research supports it. (For example, mammograms used to be recommended annually. Even though Medicare will still pay for an annual mammogram, the newer guidelines recommend a mammogram every two years for women of average risk). Of course, you and your doctor may decide to screen more often for some diseases, based on your risk and your co-morbidities (chronic disease you are already diagnosed with). Preventive services for you include: - Medicare offers their members a free annual wellness visit, which is time for you and your primary care provider to discuss and plan for your preventive service needs. Take advantage of this benefit every year! 
-All adults over the age of 72 should receive the recommended pneumonia vaccines. Current USPSTF guidelines recommend a series of two vaccines for the best pneumonia protection.  
-All adults should have a flu vaccine yearly and a tetanus vaccine every 10 years.  
-All adults age 48 and older should receive the shingles vaccines (series of two vaccines). -All adults age 38-68 who are overweight should have a diabetes screening test once every three years. -All adults born between [birth year] and 1965 should be screened once for Hepatitis C. 
-Other screening tests and preventive services for persons with diabetes include: an eye exam to screen for diabetic retinopathy, a kidney function test, a foot exam, and stricter control over your cholesterol.  
-Cardiovascular screening for adults with routine risk involves an electrocardiogram (ECG) at intervals determined by your doctor.  
-Colorectal cancer screenings should be done for adults age 54-65 with no increased risk factors for colorectal cancer. There are a number of acceptable methods of screening for this type of cancer. Each test has its own benefits and drawbacks. Discuss with your doctor what is most appropriate for you during your annual wellness visit. The different tests include: colonoscopy (considered the best screening method), a fecal occult blood test, a fecal DNA test, and sigmoidoscopy. 
 
-A bone mass density test is recommended when a woman turns 65 to screen for osteoporosis. This test is only recommended one time, as a screening. Some providers will use this same test as a disease monitoring tool if you already have osteoporosis. -Breast cancer screenings are recommended every other year for women of normal risk, age 54-69. 
-Cervical cancer screenings for women over age 72 are only recommended with certain risk factors. Here is a list of your current Health Maintenance items (your personalized list of preventive services) with a due date: 
Health Maintenance Due Topic Date Due  
 DTaP/Tdap/Td  (1 - Tdap) 03/22/1967  Colon Cancer Stool Test  03/22/1996  Glaucoma Screening   03/22/2011  Bone Mineral Density   03/22/2011 [facility] Annual Well Visit  09/23/2020 Medicare Wellness Visit, Female The best way to live healthy is to have a lifestyle where you eat a well-balanced diet, exercise regularly, limit alcohol use, and quit all forms of tobacco/nicotine, if applicable. Regular preventive services are another way to keep healthy. Preventive services (vaccines, screening tests, monitoring & exams) can help personalize your care plan, which helps you manage your own care. Screening tests can find health problems at the earliest stages, when they are easiest to treat. Kat[facility]zyna follows the current, evidence-based guidelines published by the [facility] ([facility]) when recommending preventive services for our patients. Because we follow these guidelines, sometimes recommendations change over time as research supports it. (For example, mammograms used to be recommended annually. Even though Medicare will still pay for an annual mammogram, the newer guidelines recommend a mammogram every two years for women of average risk). Of course, you and your doctor may decide to screen more often for some diseases, based on your risk and your co-morbidities (chronic disease you are already diagnosed with). Preventive services for you include: - Medicare offers their members a free annual wellness visit, which is time for you and your primary care provider to discuss and plan for your preventive service needs. Take advantage of this benefit every year! 
-All adults over the age of 72 should receive the recommended pneumonia vaccines. Current USPSTF guidelines recommend a series of two vaccines for the best pneumonia protection.  
-All adults should have a flu vaccine yearly and a tetanus vaccine every 10 years.  
-All adults age 48 and older should receive the shingles vaccines (series of two vaccines).      
-All adults age 38-68 who are overweight should have a diabetes screening test once every three years.  
-All adults born between 80 and 1965 should be screened once for Hepatitis C. 
-Other screening tests and preventive services for persons with diabetes include: an eye exam to screen for diabetic retinopathy, a kidney function test, a foot exam, and stricter control over your cholesterol.  
-Cardiovascular screening for adults with routine risk involves an electrocardiogram (ECG) at intervals determined by your doctor.  
-Colorectal cancer screenings should be done for adults age 54-65 with no increased risk factors for colorectal cancer. There are a number of acceptable methods of screening for this type of cancer. Each test has its own benefits and drawbacks. Discuss with your doctor what is most appropriate for you during your annual wellness visit. The different tests include: colonoscopy (considered the best screening method), a fecal occult blood test, a fecal DNA test, and sigmoidoscopy. 
 
-A bone mass density test is recommended when a woman turns 65 to screen for osteoporosis. This test is only recommended one time, as a screening. Some providers will use this same test as a disease monitoring tool if you already have osteoporosis. -Breast cancer screenings are recommended every other year for women of normal risk, age 54-69. 
-Cervical cancer screenings for women over age 72 are only recommended with certain risk factors. Here is a list of your current Health Maintenance items (your personalized list of preventive services) with a due date: 
Health Maintenance Due Topic Date Due  
 DTaP/Tdap/Td  (1 - Tdap) 03/22/1967  Colon Cancer Stool Test  03/22/1996  Glaucoma Screening   03/22/2011  Bone Mineral Density   03/22/2011 [address] [place] [address] Annual Well Visit  09/23/2020

## 2020-10-07 NOTE — PROGRESS NOTES
HPI  Patrice Tellez comes in for f/u care. She is accompanied by her daughter today. Poor appetite: Patient has poor appetite. She has been on Remeron 7.5 mg daily to help with her appetite. She has lost 1 pound since she was last here. She denies fever, chills, abdominal pain, nausea, vomiting or changes in bowel habit. We discussed medication management and adjustment. I will go up on the Remeron to 15 mg daily. She will keep a diary of her weight up to 3 times a week. I will follow-up at next visit. Patient had labs done recently including a thyroid screen that was stable. She states that she has had a colonoscopy done last year and the we will request the record. States that he had a few polyps noted. Her weight loss continues she might need to see the gastroenterologist.  May also need further radiological testing. Patient denies any night sweats. She has chronic illness including Parkinson's that could also cause poor appetite. HTN: Patient has hypertension. Blood pressure is stable. She is on HCTZ 25 mg daily. We will continue with this medication. Dyslipidemia: Patient has dyslipidemia. She takes Lipitor 10 mg daily. Lipid panel has been stable. She tolerates medication. Continue with the current treatment plan. Parkinson's disease: Patient has a history of Parkinson's disease. She gets tremors and shuffling gait. She is on Sinemet. We will continue with the current treatment plan. Hyperglycemia: Patient noted to have hyperglycemia. We will check a HbA1c today. This is 5.7. It is in the prediabetes range. She should intensify lifestyle and dietary modification. Health maintenance: Patient needs to have the bone density scanning done. She states that she has an appointment to be seen by the eye specialist for glaucoma screening.       Past Medical History  Past Medical History:   Diagnosis Date    Hypertension     Parkinson disease [facility])        Surgical History  Past Surgical History:   Procedure Laterality Date    HX CATARACT REMOVAL      HX COLONOSCOPY  2019    HX HYSTERECTOMY  1990    HX SMALL BOWEL RESECTION  2008        Medications  Current Outpatient Medications   Medication Sig Dispense Refill    cholecalciferol (Vitamin D3) (1000 Units /25 mcg) tablet Take  by mouth daily.  mirtazapine (REMERON) 7.5 mg tablet Take 1 Tab by mouth nightly. 30 Tab 2    hydroCHLOROthiazide (HYDRODIURIL) 25 mg tablet TAKE 1 TABLET BY MOUTH EVERY DAY 90 Tab 0    atorvastatin (LIPITOR) 10 mg tablet Take 1 Tab by mouth daily. 30 Tab 3    carbidopa-levodopa (SINEMET)  mg per tablet Take 1 Tab by mouth three (3) times daily. Allergies  No Known Allergies    Family History  No family history on file.     Social History  Social History     Socioeconomic History    Marital status:      Spouse name: Not on file    Number of children: Not on file    Years of education: Not on file    Highest education level: Not on file   Occupational History    Not on file   Social Needs    Financial resource strain: Not on file    Food insecurity     Worry: Not on file     Inability: Not on file    Transportation needs     Medical: Not on file     Non-medical: Not on file   Tobacco Use    Smoking status: Never Smoker    Smokeless tobacco: Never Used   Substance and Sexual Activity    Alcohol use: Never     Frequency: Never    Drug use: Never    Sexual activity: Not on file   Lifestyle    Physical activity     Days per week: Not on file     Minutes per session: Not on file    Stress: Not on file   Relationships    Social connections     Talks on phone: Not on file     Gets together: Not on file     Attends Jewish service: Not on file     Active member of club or organization: Not on file     Attends meetings of clubs or organizations: Not on file     Relationship status: Not on file    Intimate partner violence     Fear of current or ex partner: Not on file Emotionally abused: Not on file     Physically abused: Not on file     Forced sexual activity: Not on file   Other Topics Concern    Not on file   Social History Narrative    Not on file       Review of Systems  Review of Systems - Review of all systems is negative except as noted above in the HPI. Vital Signs  Visit Vitals  /68 (BP 1 Location: Left arm, BP Patient Position: Sitting)   Pulse 91   Temp 98.4 °F (36.9 °C) (Oral)   Resp 18   Ht 5' 4\" (1.626 m)   Wt 138 lb (62.6 kg)   SpO2 100%   BMI 23.69 kg/m²         Physical Exam  Physical Examination: General appearance -  acyanotic, in no respiratory distress and chronically ill appearing  Mental status - affect appropriate to mood  Chest - no tachypnea, retractions or cyanosis  Heart - S1 and S2 normal  Back exam - limited range of motion  Neurological - abnormal neurological exam unchanged from prior examinations  Musculoskeletal - osteoarthritic changes noted in both hands  Extremities - no pedal edema noted        Results  Results for orders placed or performed during the hospital encounter of 09/23/20   LIPID PANEL   Result Value Ref Range    LIPID PROFILE          Cholesterol, total 148 <200 MG/DL    Triglyceride 83 <150 MG/DL    HDL Cholesterol 56 40 - 60 MG/DL    LDL, calculated 75.4 0 - 100 MG/DL    VLDL, calculated 16.6 MG/DL    CHOL/HDL Ratio 2.6 0 - 5.0     CBC WITH AUTOMATED DIFF   Result Value Ref Range    WBC 7.6 4.6 - 13.2 K/uL    RBC 4.47 4.20 - 5.30 M/uL    HGB 11.6 (L) 12.0 - 16.0 g/dL    HCT 36.4 35.0 - 45.0 %    MCV 81.4 74.0 - 97.0 FL    MCH 26.0 24.0 - 34.0 PG    MCHC 31.9 31.0 - 37.0 g/dL    RDW 14.4 11.6 - 14.5 %    PLATELET 980 438 - 678 K/uL    MPV 11.8 9.2 - 11.8 FL    NEUTROPHILS 60 40 - 73 %    LYMPHOCYTES 30 21 - 52 %    MONOCYTES 8 3 - 10 %    EOSINOPHILS 2 0 - 5 %    BASOPHILS 0 0 - 2 %    ABS. NEUTROPHILS 4.5 1.8 - 8.0 K/UL    ABS. LYMPHOCYTES 2.3 0.9 - 3.6 K/UL    ABS. MONOCYTES 0.6 0.05 - 1.2 K/UL    ABS.  EOSINOPHILS 0.2 0.0 - 0.4 K/UL    ABS. BASOPHILS 0.0 0.0 - 0.1 K/UL    DF AUTOMATED     METABOLIC PANEL, COMPREHENSIVE   Result Value Ref Range    Sodium 140 136 - 145 mmol/L    Potassium 3.0 (L) 3.5 - 5.5 mmol/L    Chloride 103 100 - 111 mmol/L    CO2 30 21 - 32 mmol/L    Anion gap 7 3.0 - 18 mmol/L    Glucose 130 (H) 74 - 99 mg/dL    BUN 11 7.0 - 18 MG/DL    Creatinine 1.00 0.6 - 1.3 MG/DL    BUN/Creatinine ratio 11 (L) 12 - 20      GFR est AA >60 >60 ml/min/1.73m2    GFR est non-AA 54 (L) >60 ml/min/1.73m2    Calcium 9.5 8.5 - 10.1 MG/DL    Bilirubin, total 0.6 0.2 - 1.0 MG/DL    ALT (SGPT) 19 13 - 56 U/L    AST (SGOT) 11 10 - 38 U/L    Alk. phosphatase 67 45 - 117 U/L    Protein, total 6.8 6.4 - 8.2 g/dL    Albumin 3.3 (L) 3.4 - 5.0 g/dL    Globulin 3.5 2.0 - 4.0 g/dL    A-G Ratio 0.9 0.8 - 1.7     TSH 3RD GENERATION   Result Value Ref Range    TSH 0.57 0.36 - 3.74 uIU/mL   LIPASE   Result Value Ref Range    Lipase 94 73 - 393 U/L       ASSESSMENT and PLAN    ICD-10-CM ICD-9-CM    1. Poor appetite  R63.0 783.0 mirtazapine (REMERON) 15 mg tablet   2. Hyperglycemia  R73.9 790.29 AMB POC HEMOGLOBIN A1C   3. Essential hypertension  I10 401.9    4. Parkinson's disease ([facility] [address]  G20 332.0    5. Dyslipidemia  E78.5 272.4    6. Mood disorder (HCC)  F39 296.90    7. Encounter for Medicare annual wellness exam  Z00.00 V70.0    8. Post-menopausal osteoporosis  M81.0 733.01 DEXA BONE DENSITY STUDY AXIAL   9. Screening for glaucoma  Z13.5 V80.1 REFERRAL TO OPHTHALMOLOGY   10. ACP (advance care planning)  Z71.89 V65.49 ADVANCE CARE PLANNING FIRST 30 MINS   11. Advanced directives, counseling/discussion  Z71.89 V65.49 ADVANCE CARE PLANNING FIRST 30 MINS      FULL CODE   12.  Screening for depression  Z13.31 V79.0 Kristan 73     lab results and schedule of future lab studies reviewed with patient  reviewed diet, exercise and weight control  cardiovascular risk and specific lipid/LDL goals reviewed  reviewed medications and side effects in detail  use of aspirin to prevent MI and TIA's discussed      I have discussed the diagnosis with the patient and the intended plan of care as seen in the above orders. The patient has received an after-visit summary and questions were answered concerning future plans. I have discussed medication, side effects, and warnings with the patient in detail. The patient verbalized understanding and is in agreement with the plan of care. The patient will contact the office with any additional concerns. I spent at least 45 minutes on this visit with this established patient. Luisa Coleman MD    PLEASE NOTE:   This document has been produced using voice recognition software.  Unrecognized errors in transcription may be present

## 2020-10-07 NOTE — ACP (ADVANCE CARE PLANNING)
Advance Care Planning       Advance Care Planning (ACP) Physician/NP/PA (Provider) Conversation        Date of ACP Conversation: 10/7/2020    Conversation Conducted with:   Patient with Decision Making [address]  [facility] Decision Maker:    Current Designated Health Care Decision Maker:       If no Authorized Decision Maker has previously been identified, then patient chooses [place]:  \"Who would you like to name as your primary health care decision-maker? \"    Name: Tyler Ray   Relationship: daughter Phone number: [phone]  Chawla Scale this person be reached easily? \" YES  \"Who would you like to name as your back-up decision maker? \"   Name: Lesa Hung  Relationship: daughter Phone number: [phone]  Chawla Scale this person be reached easily? \" YES      Care Preferences:    Hospitalization: \"If your health worsens and it becomes clear that your chance of recovery is unlikely, what would your preference be regarding hospitalization? \"  If the patient would want hospitalization, answer \"yes\". If the patient would prefer comfort-focused treatment without hospitalization, answer \"no\". yes      Ventilation: \"If you were in your present state of health and suddenly became very ill and were unable to breathe on your own, what would your preference be about the use of a ventilator (breathing machine) if it was available to you? \"    If patient would desire the use of a ventilator (breathing machine), answer \"yes\", if not answer \"no\":yes    \"If your health worsens and it becomes clear that your chance of recovery is unlikely, what would your preference be about the use of a ventilator (breathing machine) if it was available to you? \"   yes      Resuscitation:  \"CPR works best to restart the heart when there is a sudden event, like a heart attack, in someone who is otherwise healthy. Unfortunately, CPR does not typically restart the heart for people who have serious health conditions or who are very sick. \"    \"In the event your heart stopped as a result of an underlying serious health condition, would you want attempts to be made to restart your heart (answer \"yes\" for attempt to resuscitate) or would you prefer a natural death (answer \"no\" for do not attempt to resuscitate)? \"   yes    Conversation Outcomes / Follow-Up Plan:   Recommended completion of Advance Directive      Length of Voluntary ACP Conversation in minutes:  16 minutes      Telma Chávez MD

## 2020-10-08 RX ORDER — POTASSIUM CHLORIDE 750 MG/1
TABLET, EXTENDED RELEASE ORAL
Qty: 90 TAB | Refills: 1 | Status: SHIPPED | OUTPATIENT
Start: 2020-10-08 | End: 2021-01-27

## 2020-10-08 NOTE — PROGRESS NOTES
Please let patient know her potassium level is low. This is due to taking HCTZ which is a fluid pill. I will send in potassium to take 10 mEq 2 x daily for a week and then 1 xdaily. We will recheck labs in a month.   Bre Dempsey MD

## 2020-10-09 NOTE — PROGRESS NOTES
Spoke with patient's daughter and lab results were given as well as recommendations.   Verbalized understanding

## 2020-10-11 RX ORDER — HYDROCHLOROTHIAZIDE 25 MG/1
TABLET ORAL
Qty: 90 TAB | Refills: 0 | Status: SHIPPED | OUTPATIENT
Start: 2020-10-11 | End: 2021-01-17

## 2020-10-15 ENCOUNTER — TELEPHONE (OUTPATIENT)
Dept: FAMILY MEDICINE CLINIC | Age: 75
End: 2020-10-15

## 2020-10-15 NOTE — TELEPHONE ENCOUNTER
Please confirm with the patient. I had adjusted her medications and sent in a new prescription for Remeron 30 mg daily. Is this what they would like a 90-day supply of ?     James Giraldo MD

## 2020-10-15 NOTE — TELEPHONE ENCOUNTER
Spoke with patient daughter at this time. She states she will give is a call back when she get to her mom house to see if 90 day supply is needed

## 2020-10-29 ENCOUNTER — TELEPHONE (OUTPATIENT)
Dept: FAMILY MEDICINE CLINIC | Age: 75
End: 2020-10-29

## 2020-10-29 DIAGNOSIS — R63.0 POOR APPETITE: ICD-10-CM

## 2020-10-29 RX ORDER — MIRTAZAPINE 15 MG/1
15 TABLET, FILM COATED ORAL
Qty: 90 TAB | Refills: 1 | Status: SHIPPED | OUTPATIENT
Start: 2020-10-29 | End: 2021-04-28

## 2021-01-05 ENCOUNTER — VIRTUAL VISIT (OUTPATIENT)
Dept: FAMILY MEDICINE CLINIC | Age: 76
End: 2021-01-05
Payer: MEDICARE

## 2021-01-05 DIAGNOSIS — R63.4 WEIGHT LOSS, UNINTENTIONAL: Primary | ICD-10-CM

## 2021-01-05 DIAGNOSIS — E78.5 DYSLIPIDEMIA: ICD-10-CM

## 2021-01-05 DIAGNOSIS — G20 PARKINSON'S DISEASE (HCC): ICD-10-CM

## 2021-01-05 DIAGNOSIS — F39 MOOD DISORDER (HCC): ICD-10-CM

## 2021-01-05 DIAGNOSIS — I10 ESSENTIAL HYPERTENSION: ICD-10-CM

## 2021-01-05 DIAGNOSIS — E87.6 HYPOKALEMIA: ICD-10-CM

## 2021-01-05 PROCEDURE — 3017F COLORECTAL CA SCREEN DOC REV: CPT | Performed by: FAMILY MEDICINE

## 2021-01-05 PROCEDURE — 1090F PRES/ABSN URINE INCON ASSESS: CPT | Performed by: FAMILY MEDICINE

## 2021-01-05 PROCEDURE — G8427 DOCREV CUR MEDS BY ELIG CLIN: HCPCS | Performed by: FAMILY MEDICINE

## 2021-01-05 PROCEDURE — 99214 OFFICE O/P EST MOD 30 MIN: CPT | Performed by: FAMILY MEDICINE

## 2021-01-05 PROCEDURE — 1101F PT FALLS ASSESS-DOCD LE1/YR: CPT | Performed by: FAMILY MEDICINE

## 2021-01-05 PROCEDURE — G8400 PT W/DXA NO RESULTS DOC: HCPCS | Performed by: FAMILY MEDICINE

## 2021-01-05 PROCEDURE — G8510 SCR DEP NEG, NO PLAN REQD: HCPCS | Performed by: FAMILY MEDICINE

## 2021-01-05 PROCEDURE — G8756 NO BP MEASURE DOC: HCPCS | Performed by: FAMILY MEDICINE

## 2021-01-05 NOTE — PROGRESS NOTES
Yogesh Funez is a 77 y.o. female who was seen by synchronous (real-time) audio-video technology on 1/5/2021 for No chief complaint on file. Assessment & Plan:       ICD-10-CM ICD-9-CM    1. Weight loss, unintentional  R63.4 783.21 REFERRAL TO GASTROENTEROLOGY      CBC WITH AUTOMATED DIFF      METABOLIC PANEL, COMPREHENSIVE   2. Hypokalemia  F24.2 965.9 METABOLIC PANEL, COMPREHENSIVE   3. Essential hypertension  I10 401.9    4. Mood disorder ([facility])  F39 296.90    5. Parkinson's disease ([facility] [address]  G20 332.0    6. Dyslipidemia  E78.5 272.4      Subjective:   Yogesh Funez is seen for follow-up care. She is accompanied by her daughter. Unintentional weight loss: Patient has unintentional weight loss. Has lost about 5 pounds since I last saw her. States that her weight is down to 133 pounds. Last time she was here it was about 138. Appetite is good. She denies abdominal pain or distention. Denies changes in her bowel habits. No fever, chills or night sweats. She does have a history of colon polyps. Given the persistent weight loss I will refer to the gastroenterologist for evaluation and opinion. May need to get a colonoscopy done. May also need to have radiological testing done. Mood disorder: Patient has a history of mood disorder. She is on Remeron 15 mg daily. Stable on the medication. Hypertension: Patient has hypertension. She is on HCTZ. Blood pressure has been stable. She will continue with the current treatment plan. Hypokalemia: Patient noted to have hypokalemia at last lab check. She has taken potassium supplementation. We will recheck labs. Dyslipidemia: Patient has dyslipidemia and is on Lipitor 10 mg daily. Stable on the medication. We will continue with the current treatment plan. Parkinson's disease: Patient has a history of Parkinson's disease. She is on Sinemet. She will continue with this medication. Stable on the same.       Prior to Admission medications    Medication Sig Start Date End Date Taking? Authorizing Provider   mirtazapine (REMERON) 15 mg tablet Take 1 Tab by mouth nightly. 10/29/20   Telma Gurrola MD   hydroCHLOROthiazide (HYDRODIURIL) 25 mg tablet TAKE 1 TABLET BY MOUTH EVERY DAY 10/11/20   Telma Gurrola MD   potassium chloride (KLOR-CON) 10 mEq tablet Take 2 x daily for 1 week then 1 x daily. 10/8/20   Telma Gurrola MD   cholecalciferol (Vitamin D3) (1000 Units /25 mcg) tablet Take  by mouth daily. Provider, Historical   atorvastatin (LIPITOR) 10 mg tablet Take 1 Tab by mouth daily. 2/6/20   Telma Gurrola MD   carbidopa-levodopa (SINEMET)  mg per tablet Take 1 Tab by mouth three (3) times daily. Provider, Historical     ROS Review of all systems is negative except as noted above in the HPI. Objective:   No flowsheet data found. Constitutional: [x] No apparent distress      Mental status: [x] Alert and awake  [x] Oriented to person/place/time [x] Able to follow commands     HENT: [x] Normocephalic, atraumatic     Pulmonary/Chest: [x] Respiratory effort normal   [x] No visualized signs of difficulty breathing or respiratory distress           Neurological:        [x] No Facial Asymmetry (Cranial nerve 7 motor function) (limited exam due to video visit)                    Psychiatric:       [x] Normal Affect    We discussed the expected course, resolution and complications of the diagnosis(es) in detail. Medication risks, benefits, costs, interactions, and alternatives were discussed as indicated. I advised her to contact the office if her condition worsens, changes or fails to improve as anticipated. She expressed understanding with the diagnosis(es) and plan. Lisa Ye, who was evaluated through a patient-initiated, synchronous (real-time) audio-video encounter, and/or her healthcare decision maker, is aware that it is a billable service, with coverage as determined by her insurance carrier.  She provided verbal consent to proceed: Yes, and patient identification was verified. It was conducted pursuant to the emergency declaration under the [address] [place] [address] and the [employer] General Act. A caregiver was present when appropriate. Ability to conduct physical exam was limited. I was in the office. The patient was at home.       Venita Blank MD

## 2021-01-17 RX ORDER — HYDROCHLOROTHIAZIDE 25 MG/1
TABLET ORAL
Qty: 90 TAB | Refills: 0 | Status: SHIPPED | OUTPATIENT
Start: 2021-01-17 | End: 2021-04-12

## 2021-01-27 RX ORDER — POTASSIUM CHLORIDE 750 MG/1
TABLET, EXTENDED RELEASE ORAL
Qty: 90 TAB | Refills: 1 | Status: SHIPPED | OUTPATIENT
Start: 2021-01-27 | End: 2021-04-14

## 2021-03-22 ENCOUNTER — TELEPHONE (OUTPATIENT)
Dept: FAMILY MEDICINE CLINIC | Age: 76
End: 2021-03-22

## 2021-03-22 NOTE — TELEPHONE ENCOUNTER
Patient's daughter, hugh Juarez, called. She is interested in getting COVID vaccine.     [phone]

## 2021-03-22 NOTE — TELEPHONE ENCOUNTER
Incoming call from patient interested in receiving COVID-19 vaccine at this time. Please advise if patient should receive vaccine. If so please place order for 1st and 2nd dose     Past or recent coronavirus:NO     Vaccine in the past 2 weeks:NO     Allergic reaction to any vaccines:NO     65 years or older:YES     Current medical conditions: Parkinson, Disease,HTN, Mood disorder   Cold Symptoms: NO     Current Infection Present :NO     Please Advise

## 2021-03-23 DIAGNOSIS — Z23 ENCOUNTER FOR IMMUNIZATION: Primary | ICD-10-CM

## 2021-04-12 RX ORDER — HYDROCHLOROTHIAZIDE 25 MG/1
TABLET ORAL
Qty: 90 TAB | Refills: 0 | Status: SHIPPED | OUTPATIENT
Start: 2021-04-12 | End: 2021-04-14

## 2021-04-14 ENCOUNTER — OFFICE VISIT (OUTPATIENT)
Dept: FAMILY MEDICINE CLINIC | Age: 76
End: 2021-04-14
Payer: MEDICARE

## 2021-04-14 ENCOUNTER — HOSPITAL ENCOUNTER (OUTPATIENT)
Dept: LAB | Age: 76
Discharge: HOME | End: 2021-04-14
Payer: MEDICARE

## 2021-04-14 VITALS
RESPIRATION RATE: 16 BRPM | SYSTOLIC BLOOD PRESSURE: 97 MMHG | OXYGEN SATURATION: 97 % | TEMPERATURE: 98.1 F | WEIGHT: 121 LBS | HEART RATE: 84 BPM | BODY MASS INDEX: 20.66 KG/M2 | DIASTOLIC BLOOD PRESSURE: 59 MMHG | HEIGHT: 64 IN

## 2021-04-14 DIAGNOSIS — E55.9 HYPOVITAMINOSIS D: ICD-10-CM

## 2021-04-14 DIAGNOSIS — G20 PARKINSON'S DISEASE (HCC): ICD-10-CM

## 2021-04-14 DIAGNOSIS — R06.02 SOB (SHORTNESS OF BREATH): ICD-10-CM

## 2021-04-14 DIAGNOSIS — R63.4 ABNORMAL WEIGHT LOSS: Primary | ICD-10-CM

## 2021-04-14 DIAGNOSIS — E78.5 DYSLIPIDEMIA: ICD-10-CM

## 2021-04-14 DIAGNOSIS — E07.9 THYROID DISORDER: ICD-10-CM

## 2021-04-14 DIAGNOSIS — R19.4 CHANGE IN BOWEL HABIT: ICD-10-CM

## 2021-04-14 DIAGNOSIS — R63.4 WEIGHT LOSS, UNINTENTIONAL: ICD-10-CM

## 2021-04-14 DIAGNOSIS — I10 ESSENTIAL HYPERTENSION: ICD-10-CM

## 2021-04-14 DIAGNOSIS — Z12.11 SCREEN FOR COLON CANCER: ICD-10-CM

## 2021-04-14 DIAGNOSIS — E87.6 HYPOKALEMIA: ICD-10-CM

## 2021-04-14 DIAGNOSIS — F39 MOOD DISORDER (HCC): ICD-10-CM

## 2021-04-14 LAB
25(OH)D3 SERPL-MCNC: 61.2 NG/ML (ref 30–100)
ALBUMIN SERPL-MCNC: 3.6 G/DL (ref 3.4–5)
ALBUMIN/GLOB SERPL: 1.2 {RATIO} (ref 0.8–1.7)
ALP SERPL-CCNC: 81 U/L (ref 45–117)
ALT SERPL-CCNC: 22 U/L (ref 13–56)
ANION GAP SERPL CALC-SCNC: 7 MMOL/L (ref 3–18)
AST SERPL-CCNC: 14 U/L (ref 10–38)
BASOPHILS # BLD: 0 K/UL (ref 0–0.1)
BASOPHILS NFR BLD: 1 % (ref 0–2)
BILIRUB SERPL-MCNC: 0.6 MG/DL (ref 0.2–1)
BUN SERPL-MCNC: 21 MG/DL (ref 7–18)
BUN/CREAT SERPL: 22 (ref 12–20)
CALCIUM SERPL-MCNC: 9.1 MG/DL (ref 8.5–10.1)
CHLORIDE SERPL-SCNC: 109 MMOL/L (ref 100–111)
CHOLEST SERPL-MCNC: 127 MG/DL
CO2 SERPL-SCNC: 28 MMOL/L (ref 21–32)
CREAT SERPL-MCNC: 0.94 MG/DL (ref 0.6–1.3)
DIFFERENTIAL METHOD BLD: ABNORMAL
EOSINOPHIL # BLD: 0.1 K/UL (ref 0–0.4)
EOSINOPHIL NFR BLD: 2 % (ref 0–5)
ERYTHROCYTE [DISTWIDTH] IN BLOOD BY AUTOMATED COUNT: 15.2 % (ref 11.6–14.5)
GLOBULIN SER CALC-MCNC: 3.1 G/DL (ref 2–4)
GLUCOSE SERPL-MCNC: 88 MG/DL (ref 74–99)
HCT VFR BLD AUTO: 36.4 % (ref 35–45)
HDLC SERPL-MCNC: 60 MG/DL (ref 40–60)
HDLC SERPL: 2.1 {RATIO} (ref 0–5)
HGB BLD-MCNC: 10.8 G/DL (ref 12–16)
LDLC SERPL CALC-MCNC: 55 MG/DL (ref 0–100)
LIPID PROFILE,FLP: NORMAL
LYMPHOCYTES # BLD: 2.2 K/UL (ref 0.9–3.6)
LYMPHOCYTES NFR BLD: 33 % (ref 21–52)
MCH RBC QN AUTO: 25 PG (ref 24–34)
MCHC RBC AUTO-ENTMCNC: 29.7 G/DL (ref 31–37)
MCV RBC AUTO: 84.3 FL (ref 74–97)
MONOCYTES # BLD: 0.4 K/UL (ref 0.05–1.2)
MONOCYTES NFR BLD: 7 % (ref 3–10)
NEUTS SEG # BLD: 3.8 K/UL (ref 1.8–8)
NEUTS SEG NFR BLD: 58 % (ref 40–73)
PLATELET # BLD AUTO: 269 K/UL (ref 135–420)
PMV BLD AUTO: 12 FL (ref 9.2–11.8)
POTASSIUM SERPL-SCNC: 4.1 MMOL/L (ref 3.5–5.5)
PROT SERPL-MCNC: 6.7 G/DL (ref 6.4–8.2)
RBC # BLD AUTO: 4.32 M/UL (ref 4.2–5.3)
SODIUM SERPL-SCNC: 144 MMOL/L (ref 136–145)
TRIGL SERPL-MCNC: 60 MG/DL (ref ?–150)
TSH SERPL DL<=0.05 MIU/L-ACNC: 0.36 UIU/ML (ref 0.36–3.74)
VLDLC SERPL CALC-MCNC: 12 MG/DL
WBC # BLD AUTO: 6.5 K/UL (ref 4.6–13.2)

## 2021-04-14 PROCEDURE — 84443 ASSAY THYROID STIM HORMONE: CPT

## 2021-04-14 PROCEDURE — 1090F PRES/ABSN URINE INCON ASSESS: CPT | Performed by: FAMILY MEDICINE

## 2021-04-14 PROCEDURE — G8427 DOCREV CUR MEDS BY ELIG CLIN: HCPCS | Performed by: FAMILY MEDICINE

## 2021-04-14 PROCEDURE — 36415 COLL VENOUS BLD VENIPUNCTURE: CPT | Performed by: FAMILY MEDICINE

## 2021-04-14 PROCEDURE — 82306 VITAMIN D 25 HYDROXY: CPT

## 2021-04-14 PROCEDURE — 3017F COLORECTAL CA SCREEN DOC REV: CPT | Performed by: FAMILY MEDICINE

## 2021-04-14 PROCEDURE — G8754 DIAS BP LESS 90: HCPCS | Performed by: FAMILY MEDICINE

## 2021-04-14 PROCEDURE — G8420 CALC BMI NORM PARAMETERS: HCPCS | Performed by: FAMILY MEDICINE

## 2021-04-14 PROCEDURE — G8400 PT W/DXA NO RESULTS DOC: HCPCS | Performed by: FAMILY MEDICINE

## 2021-04-14 PROCEDURE — 99215 OFFICE O/P EST HI 40 MIN: CPT | Performed by: FAMILY MEDICINE

## 2021-04-14 PROCEDURE — 80061 LIPID PANEL: CPT

## 2021-04-14 PROCEDURE — G8536 NO DOC ELDER MAL SCRN: HCPCS | Performed by: FAMILY MEDICINE

## 2021-04-14 PROCEDURE — 80053 COMPREHEN METABOLIC PANEL: CPT

## 2021-04-14 PROCEDURE — G8752 SYS BP LESS 140: HCPCS | Performed by: FAMILY MEDICINE

## 2021-04-14 PROCEDURE — 1101F PT FALLS ASSESS-DOCD LE1/YR: CPT | Performed by: FAMILY MEDICINE

## 2021-04-14 PROCEDURE — G8510 SCR DEP NEG, NO PLAN REQD: HCPCS | Performed by: FAMILY MEDICINE

## 2021-04-14 PROCEDURE — 85025 COMPLETE CBC W/AUTO DIFF WBC: CPT

## 2021-04-14 NOTE — PROGRESS NOTES
YOANDY  Ramona Coelho comes in for f/u care. Parkinson's disease: Patient has history of Parkinson's disease. She has been followed up by the Dr. Tona Green. She is on Sinemet. Has shuffling gait and tremors. She has been more forgetful lately. May need to be seen by the neuropsychologist for memory assessment. Patient to discuss this with her neurologist.  She will call to set up follow-up appointment. Change in bowel habits: Patient has noticed some change in her bowel habits. Denies any blood in the stool but she has constipation on and off with occasional loose stools. She denies abdominal distention or bloating. Denies abdominal pain. I will refer to the gastroenterologist.  She has not had a colonoscopy done. May need to have colon cancer screening done. Weight loss: Patient has unexplained weight loss. She has lost more than 17 pounds since I last saw her. She also has a change in bowel habits. She denies night sweats, fever or chills. She is on Remeron. Appetite however remains poor. Has never had a colonoscopy done. I will refer to the gastroenterologist for evaluation and opinion. HTN: Patient has hypertension. Blood pressure today is 97/59. She has been on HCTZ. She is not taking any potassium supplementation. I will discontinue the HCTZ. She will keep a blood pressure log and I will follow-up at next visit. Patient denies headache, changes in vision or focal weakness. Mood disorder: Patient has a history of mood disorder with anxiety and depression. She is on Remeron. This has helped stabilize her mood. She takes 50 mg daily. We will continue with current treatment plan. SOB: Patient has shortness of breath that comes on and off. She has noticed is mainly after staying out in the cold weather on after activity. She denies chest pain, diaphoresis, wheeze. She denies having allergies during the pollen season. Denies cough, hemoptysis, fever or chills.   I will check a chest x-ray. Hypovitaminosis D: Patient has hypovitaminosis D. She is on vitamin D replacement therapy. I will recheck labs today. Past Medical History  Past Medical History:   Diagnosis Date    Hypertension     Parkinson disease ([address]        Surgical History  Past Surgical History:   Procedure Laterality Date    HX CATARACT REMOVAL      HX COLONOSCOPY  2019    HX HYSTERECTOMY  1990    HX SMALL BOWEL RESECTION  2008        Medications  Current Outpatient Medications   Medication Sig Dispense Refill    hydroCHLOROthiazide (HYDRODIURIL) 25 mg tablet TAKE 1 TABLET BY MOUTH EVERY DAY 90 Tab 0    mirtazapine (REMERON) 15 mg tablet Take 1 Tab by mouth nightly. 90 Tab 1    cholecalciferol (Vitamin D3) (1000 Units /25 mcg) tablet Take  by mouth daily.  atorvastatin (LIPITOR) 10 mg tablet Take 1 Tab by mouth daily. 30 Tab 3    carbidopa-levodopa (SINEMET)  mg per tablet Take 1 Tab by mouth three (3) times daily.  potassium chloride (Klor-Con M10) 10 mEq tablet TAKE 1 TABLET BY MOUTH TWICE A DAY FOR 1 WEEK, THEN 1 TABLET DAILY 90 Tab 1       Allergies  No Known Allergies    Family History  History reviewed. No pertinent family history.     Social History  Social History     Socioeconomic History    Marital status:      Spouse name: Not on file    Number of children: Not on file    Years of education: Not on file    Highest education level: Not on file   Occupational History    Not on file   Social Needs    Financial resource strain: Not on file    Food insecurity     Worry: Not on file     Inability: Not on file    Transportation needs     Medical: Not on file     Non-medical: Not on file   Tobacco Use    Smoking status: Never Smoker    Smokeless tobacco: Never Used   Substance and Sexual Activity    Alcohol use: Never     Frequency: Never    Drug use: Never    Sexual activity: Not on file   Lifestyle    Physical activity     Days per week: Not on file Minutes per session: Not on file    Stress: Not on file   Relationships    Social connections     Talks on phone: Not on file     Gets together: Not on file     Attends Sabianist service: Not on file     Active member of club or organization: Not on file     Attends meetings of clubs or organizations: Not on file     Relationship status: Not on file    Intimate partner violence     Fear of current or ex partner: Not on file     Emotionally abused: Not on file     Physically abused: Not on file     Forced sexual activity: Not on file   Other Topics Concern    Not on file   Social History Narrative    Not on file       Review of Systems  Review of Systems - Review of all systems is negative except as noted above in the HPI. Vital Signs  Visit Vitals  BP (!) 97/59 (BP 1 Location: Left upper arm, BP Patient Position: Sitting, BP Cuff Size: Adult)   Pulse 84   Temp 98.1 °F (36.7 °C) (Oral)   Resp 16   Ht 5' 4\" (1.626 m)   Wt 121 lb (54.9 kg)   SpO2 97%   BMI 20.77 kg/m²         Physical Exam  Physical Examination: General appearance - chronically ill appearing  Mental status - affect appropriate to mood  Neck - supple, no significant adenopathy  Lymphatics - no palpable lymphadenopathy  Chest - decreased air entry noted bilateral lung bases  Heart - S1 and S2 normal  Abdomen - no rebound tenderness noted  bowel sounds normal  Back exam - limited range of motion  Neurological - abnormal neurological exam unchanged from prior examinations  Musculoskeletal - osteoarthritic changes noted in both hands  Extremities - no pedal edema noted, intact peripheral pulses      Results  Results for orders placed or performed in visit on 10/07/20   AMB POC HEMOGLOBIN A1C   Result Value Ref Range    Hemoglobin A1c (POC) 5.7 %       ASSESSMENT and PLAN    ICD-10-CM ICD-9-CM    1. Abnormal weight loss  R63.4 783.21 REFERRAL TO GASTROENTEROLOGY      COLLECTION VENOUS BLOOD,VENIPUNCTURE   2.  Change in bowel habit  R19.4 787.99 REFERRAL TO GASTROENTEROLOGY   3. Screen for colon cancer  Z12.11 V76.51 REFERRAL TO GASTROENTEROLOGY   4. Essential hypertension  I10 401.9    5. Parkinson's disease ([address]  G20 332.0    6. Dyslipidemia  E78.5 272.4 LIPID PANEL      COLLECTION VENOUS BLOOD,VENIPUNCTURE   7. Mood disorder (HCC)  F39 296.90    8. SOB (shortness of breath)  R06.02 786.05 XR CHEST PA LAT   9. Thyroid disorder  E07.9 246.9 TSH 3RD GENERATION      COLLECTION VENOUS BLOOD,VENIPUNCTURE   10. Hypovitaminosis D  E55.9 268.9 VITAMIN D, 25 HYDROXY      COLLECTION VENOUS BLOOD,VENIPUNCTURE     lab results and schedule of future lab studies reviewed with patient  reviewed diet, exercise and weight control  cardiovascular risk and specific lipid/LDL goals reviewed  reviewed medications and side effects in detail  radiology results and schedule of future radiology studies reviewed with patient      I have discussed the diagnosis with the patient and the intended plan of care as seen in the above orders. The patient has received an after-visit summary and questions were answered concerning future plans. I have discussed medication, side effects, and warnings with the patient in detail. The patient verbalized understanding and is in agreement with the plan of care. The patient will contact the office with any additional concerns. I spent at least 45 minutes on this visit with this established patient. Jamilah Drake MD    PLEASE NOTE:   This document has been produced using voice recognition software.  Unrecognized errors in transcription may be present

## 2021-04-14 NOTE — PROGRESS NOTES
Chief Complaint   Patient presents with    Follow-up     1. Have you been to the ER, urgent care clinic since your last visit? Hospitalized since your last visit? No    2. Have you seen or consulted any other health care providers outside of the [address] [place] [address] since your last visit? Include any pap smears or colon screening.  No

## 2021-04-14 NOTE — PROGRESS NOTES
Patient came into lab for venipuncture. Patient tolerated well. Patient labs was ordered by PCP. Patient used left arm. Patient didn't have any questions or concerns.

## 2021-04-18 NOTE — PROGRESS NOTES
Please let patient know her hemoglobin is slightly low at 10.8. We will recheck this at next visit. Her EGFR is slightly low at 58. She should maintain good hydration status. Her vitamin D level is stable. Continue current management plan.   Gerard Montes De Oca MD

## 2021-04-23 NOTE — PROGRESS NOTES
Spoke with patient. Patient was given lab results. Patient stated understanding. Patient didn't have any other questions or concerns.

## 2021-04-28 DIAGNOSIS — R63.0 POOR APPETITE: ICD-10-CM

## 2021-04-28 RX ORDER — MIRTAZAPINE 15 MG/1
TABLET, FILM COATED ORAL
Qty: 90 TAB | Refills: 1 | Status: SHIPPED | OUTPATIENT
Start: 2021-04-28 | End: 2021-10-26

## 2021-06-14 ENCOUNTER — OFFICE VISIT (OUTPATIENT)
Dept: FAMILY MEDICINE CLINIC | Age: 76
End: 2021-06-14
Payer: MEDICARE

## 2021-06-14 VITALS
DIASTOLIC BLOOD PRESSURE: 67 MMHG | WEIGHT: 129 LBS | HEART RATE: 82 BPM | OXYGEN SATURATION: 97 % | HEIGHT: 64 IN | TEMPERATURE: 98.9 F | RESPIRATION RATE: 16 BRPM | BODY MASS INDEX: 22.02 KG/M2 | SYSTOLIC BLOOD PRESSURE: 98 MMHG

## 2021-06-14 DIAGNOSIS — R63.8 WEIGHT DISORDER: ICD-10-CM

## 2021-06-14 DIAGNOSIS — Z12.11 SCREEN FOR COLON CANCER: ICD-10-CM

## 2021-06-14 DIAGNOSIS — E78.5 DYSLIPIDEMIA: ICD-10-CM

## 2021-06-14 DIAGNOSIS — G20 PARKINSON'S DISEASE (HCC): Primary | ICD-10-CM

## 2021-06-14 DIAGNOSIS — F39 MOOD DISORDER (HCC): ICD-10-CM

## 2021-06-14 PROCEDURE — G8510 SCR DEP NEG, NO PLAN REQD: HCPCS | Performed by: FAMILY MEDICINE

## 2021-06-14 PROCEDURE — 1101F PT FALLS ASSESS-DOCD LE1/YR: CPT | Performed by: FAMILY MEDICINE

## 2021-06-14 PROCEDURE — G8420 CALC BMI NORM PARAMETERS: HCPCS | Performed by: FAMILY MEDICINE

## 2021-06-14 PROCEDURE — G8400 PT W/DXA NO RESULTS DOC: HCPCS | Performed by: FAMILY MEDICINE

## 2021-06-14 PROCEDURE — G8536 NO DOC ELDER MAL SCRN: HCPCS | Performed by: FAMILY MEDICINE

## 2021-06-14 PROCEDURE — G8754 DIAS BP LESS 90: HCPCS | Performed by: FAMILY MEDICINE

## 2021-06-14 PROCEDURE — G8427 DOCREV CUR MEDS BY ELIG CLIN: HCPCS | Performed by: FAMILY MEDICINE

## 2021-06-14 PROCEDURE — 99213 OFFICE O/P EST LOW 20 MIN: CPT | Performed by: FAMILY MEDICINE

## 2021-06-14 PROCEDURE — 1090F PRES/ABSN URINE INCON ASSESS: CPT | Performed by: FAMILY MEDICINE

## 2021-06-14 PROCEDURE — G8752 SYS BP LESS 140: HCPCS | Performed by: FAMILY MEDICINE

## 2021-06-14 PROCEDURE — 3017F COLORECTAL CA SCREEN DOC REV: CPT | Performed by: FAMILY MEDICINE

## 2021-06-14 NOTE — PROGRESS NOTES
YOANDY  Sharene Oppenheim comes in for f/u care. Dyslipidemia: Patient has dyslipidemia. She is on atorvastatin. Has been stable on the medication. Continue current treatment plan. Parkinson's disease: Patient has Parkinson's disease. She is on Sinemet. Stable on medication. Weight concern: Patient had some weight loss. She has been on Remeron. Has gained about 9 pounds since we last spoke. Encouraged to eat a healthy diet. She has been seen by the gastroenterologist.  Mood disorder: Patient has mood disorder with mood swings. She is on Remeron. This has helped stabilize her mood. Also helps with insomnia. HM: Patient needs to have colon cancer screening. I will give the FIT stool kit. Has seen the gastroenterologist.  Patient's daughter is hesitant for her to get a colonoscopy. Previously when she had a colonoscopy she did not tolerate the sedation medication well. They do have a follow-up appointment with a gastroenterologist and can discuss this further. Past Medical History  Past Medical History:   Diagnosis Date    Hypertension     Parkinson disease [address]        Surgical History  Past Surgical History:   Procedure Laterality Date    HX CATARACT REMOVAL      HX COLONOSCOPY  2019    HX HYSTERECTOMY  1990    HX SMALL BOWEL RESECTION  2008        Medications  Current Outpatient Medications   Medication Sig Dispense Refill    mirtazapine (REMERON) 15 mg tablet TAKE 1 TABLET BY MOUTH EVERY DAY AT NIGHT 90 Tab 1    cholecalciferol (Vitamin D3) (1000 Units /25 mcg) tablet Take  by mouth daily.  atorvastatin (LIPITOR) 10 mg tablet Take 1 Tab by mouth daily. 30 Tab 3    carbidopa-levodopa (SINEMET)  mg per tablet Take 1 Tab by mouth three (3) times daily. Allergies  No Known Allergies    Family History  History reviewed. No pertinent family history.     Social History  Social History     Socioeconomic History    Marital status:      Spouse name: Not on file    Number of children: Not on file    Years of education: Not on file    Highest education level: Not on file   Occupational History    Not on file   Tobacco Use    Smoking status: Never Smoker    Smokeless tobacco: Never Used   Vaping Use    Vaping Use: Never used   Substance and Sexual Activity    Alcohol use: Never    Drug use: Never    Sexual activity: Not Currently   Other Topics Concern    Not on file   Social History Narrative    Not on file     Social Determinants of Health     Financial Resource Strain:     Difficulty of Paying Living Expenses:    Food Insecurity:     Worried About Running Out of Food in the Last Year:     [address] in the Last Year:    Transportation Needs:     Lack of Transportation (Medical):  Lack of Transportation (Non-Medical):    Physical Activity:     Days of Exercise per Week:     Minutes of Exercise per Session:    Stress:     Feeling of Stress :    Social Connections:     Frequency of Communication with Friends and Family:     Frequency of Social Gatherings with Friends and Family:     Attends Druze Services:     Active Member of Clubs or Organizations:     Attends Club or Organization Meetings:     Marital Status:    Intimate Partner Violence:     Fear of Current or Ex-Partner:     Emotionally Abused:     Physically Abused:     Sexually Abused:        Review of Systems  Review of Systems - Review of all systems is negative except as noted above in the HPI.     Vital Signs  Visit Vitals  BP 98/67 (BP 1 Location: Left upper arm, BP Patient Position: Sitting, BP Cuff Size: Adult)   Pulse 82   Temp 98.9 °F (37.2 °C) (Oral)   Resp 16   Ht 5' 4\" (1.626 m)   Wt 129 lb (58.5 kg)   SpO2 97%   BMI 22.14 kg/m²         Physical Exam  Physical Examination: General appearance - acyanotic, in no respiratory distress  Mental status - affect appropriate to mood  Neck - supple, no significant adenopathy  Lymphatics - no palpable lymphadenopathy  Chest - no tachypnea, retractions or cyanosis  Heart - S1 and S2 normal  Back exam - limited range of motion  Neurological - abnormal neurological exam unchanged from prior examinations  Musculoskeletal - osteoarthritic changes noted in both hands  Extremities - no pedal edema noted, intact peripheral pulses      Results  Results for orders placed or performed during the hospital encounter of 04/14/21   CBC WITH AUTOMATED DIFF   Result Value Ref Range    WBC 6.5 4.6 - 13.2 K/uL    RBC 4.32 4.20 - 5.30 M/uL    HGB 10.8 (L) 12.0 - 16.0 g/dL    HCT 36.4 35.0 - 45.0 %    MCV 84.3 74.0 - 97.0 FL    MCH 25.0 24.0 - 34.0 PG    MCHC 29.7 (L) 31.0 - 37.0 g/dL    RDW 15.2 (H) 11.6 - 14.5 %    PLATELET 453 029 - 188 K/uL    MPV 12.0 (H) 9.2 - 11.8 FL    NEUTROPHILS 58 40 - 73 %    LYMPHOCYTES 33 21 - 52 %    MONOCYTES 7 3 - 10 %    EOSINOPHILS 2 0 - 5 %    BASOPHILS 1 0 - 2 %    ABS. NEUTROPHILS 3.8 1.8 - 8.0 K/UL    ABS. LYMPHOCYTES 2.2 0.9 - 3.6 K/UL    ABS. MONOCYTES 0.4 0.05 - 1.2 K/UL    ABS. EOSINOPHILS 0.1 0.0 - 0.4 K/UL    ABS. BASOPHILS 0.0 0.0 - 0.1 K/UL    DF AUTOMATED     METABOLIC PANEL, COMPREHENSIVE   Result Value Ref Range    Sodium 144 136 - 145 mmol/L    Potassium 4.1 3.5 - 5.5 mmol/L    Chloride 109 100 - 111 mmol/L    CO2 28 21 - 32 mmol/L    Anion gap 7 3.0 - 18 mmol/L    Glucose 88 74 - 99 mg/dL    BUN 21 (H) 7.0 - 18 MG/DL    Creatinine 0.94 0.6 - 1.3 MG/DL    BUN/Creatinine ratio 22 (H) 12 - 20      GFR est AA >60 >60 ml/min/1.73m2    GFR est non-AA 58 (L) >60 ml/min/1.73m2    Calcium 9.1 8.5 - 10.1 MG/DL    Bilirubin, total 0.6 0.2 - 1.0 MG/DL    ALT (SGPT) 22 13 - 56 U/L    AST (SGOT) 14 10 - 38 U/L    Alk.  phosphatase 81 45 - 117 U/L    Protein, total 6.7 6.4 - 8.2 g/dL    Albumin 3.6 3.4 - 5.0 g/dL    Globulin 3.1 2.0 - 4.0 g/dL    A-G Ratio 1.2 0.8 - 1.7     LIPID PANEL   Result Value Ref Range    LIPID PROFILE          Cholesterol, total 127 <200 MG/DL    Triglyceride 60 <150 MG/DL    HDL Cholesterol 60 40 - 60 MG/DL    LDL, calculated 55 0 - 100 MG/DL    VLDL, calculated 12 MG/DL    CHOL/HDL Ratio 2.1 0 - 5.0     TSH 3RD GENERATION   Result Value Ref Range    TSH 0.36 0.36 - 3.74 uIU/mL   VITAMIN D, 25 HYDROXY   Result Value Ref Range    Vitamin D 25-Hydroxy 61.2 30 - 100 ng/mL       ASSESSMENT and PLAN    ICD-10-CM ICD-9-CM    1. Parkinson's disease ([address]  G20 332.0    2. Dyslipidemia  E78.5 272.4    3. Mood disorder (HCC)  F39 296.90    4. Weight disorder  R63.8 783.9    5. Screen for colon cancer  Z12.11 V76.51 OCCULT BLOOD IMMUNOASSAY,DIAGNOSTIC     lab results and schedule of future lab studies reviewed with patient  reviewed diet, exercise and weight control  reviewed medications and side effects in detail      I have discussed the diagnosis with the patient and the intended plan of care as seen in the above orders. The patient has received an after-visit summary and questions were answered concerning future plans. I have discussed medication, side effects, and warnings with the patient in detail. The patient verbalized understanding and is in agreement with the plan of care. The patient will contact the office with any additional concerns. Dany Pride MD    PLEASE NOTE:   This document has been produced using voice recognition software.  Unrecognized errors in transcription may be present

## 2021-06-14 NOTE — PROGRESS NOTES
Chief Complaint   Patient presents with    Follow Up Chronic Condition     1. Have you been to the ER, urgent care clinic since your last visit? Hospitalized since your last visit? No    2. Have you seen or consulted any other health care providers outside of the [address] [place] [address] since your last visit? Include any pap smears or colon screening.  No

## 2021-10-05 ENCOUNTER — OFFICE VISIT (OUTPATIENT)
Dept: FAMILY MEDICINE CLINIC | Age: 76
End: 2021-10-05
Payer: MEDICARE

## 2021-10-05 VITALS
HEIGHT: 64 IN | SYSTOLIC BLOOD PRESSURE: 123 MMHG | HEART RATE: 84 BPM | WEIGHT: 140 LBS | OXYGEN SATURATION: 97 % | TEMPERATURE: 97.9 F | BODY MASS INDEX: 23.9 KG/M2 | DIASTOLIC BLOOD PRESSURE: 77 MMHG | RESPIRATION RATE: 16 BRPM

## 2021-10-05 DIAGNOSIS — Z71.89 ADVANCED DIRECTIVES, COUNSELING/DISCUSSION: ICD-10-CM

## 2021-10-05 DIAGNOSIS — Z00.00 MEDICARE ANNUAL WELLNESS VISIT, SUBSEQUENT: ICD-10-CM

## 2021-10-05 DIAGNOSIS — Z12.11 SCREEN FOR COLON CANCER: ICD-10-CM

## 2021-10-05 DIAGNOSIS — R63.8 WEIGHT DISORDER: ICD-10-CM

## 2021-10-05 DIAGNOSIS — R63.0 POOR APPETITE: ICD-10-CM

## 2021-10-05 DIAGNOSIS — E78.5 DYSLIPIDEMIA: ICD-10-CM

## 2021-10-05 DIAGNOSIS — Z23 ENCOUNTER FOR IMMUNIZATION: ICD-10-CM

## 2021-10-05 DIAGNOSIS — R73.9 HYPERGLYCEMIA: ICD-10-CM

## 2021-10-05 DIAGNOSIS — G20 PARKINSON'S DISEASE (HCC): Primary | ICD-10-CM

## 2021-10-05 DIAGNOSIS — Z13.31 SCREENING FOR DEPRESSION: ICD-10-CM

## 2021-10-05 DIAGNOSIS — F39 MOOD DISORDER (HCC): ICD-10-CM

## 2021-10-05 LAB — HBA1C MFR BLD HPLC: 5.4 %

## 2021-10-05 PROCEDURE — G8752 SYS BP LESS 140: HCPCS | Performed by: FAMILY MEDICINE

## 2021-10-05 PROCEDURE — 90694 VACC AIIV4 NO PRSRV 0.5ML IM: CPT | Performed by: FAMILY MEDICINE

## 2021-10-05 PROCEDURE — 99214 OFFICE O/P EST MOD 30 MIN: CPT | Performed by: FAMILY MEDICINE

## 2021-10-05 PROCEDURE — 1090F PRES/ABSN URINE INCON ASSESS: CPT | Performed by: FAMILY MEDICINE

## 2021-10-05 PROCEDURE — G8427 DOCREV CUR MEDS BY ELIG CLIN: HCPCS | Performed by: FAMILY MEDICINE

## 2021-10-05 PROCEDURE — G8420 CALC BMI NORM PARAMETERS: HCPCS | Performed by: FAMILY MEDICINE

## 2021-10-05 PROCEDURE — G8536 NO DOC ELDER MAL SCRN: HCPCS | Performed by: FAMILY MEDICINE

## 2021-10-05 PROCEDURE — G8400 PT W/DXA NO RESULTS DOC: HCPCS | Performed by: FAMILY MEDICINE

## 2021-10-05 PROCEDURE — 1101F PT FALLS ASSESS-DOCD LE1/YR: CPT | Performed by: FAMILY MEDICINE

## 2021-10-05 PROCEDURE — 3017F COLORECTAL CA SCREEN DOC REV: CPT | Performed by: FAMILY MEDICINE

## 2021-10-05 PROCEDURE — G8754 DIAS BP LESS 90: HCPCS | Performed by: FAMILY MEDICINE

## 2021-10-05 PROCEDURE — G0444 DEPRESSION SCREEN ANNUAL: HCPCS | Performed by: FAMILY MEDICINE

## 2021-10-05 PROCEDURE — 83036 HEMOGLOBIN GLYCOSYLATED A1C: CPT | Performed by: FAMILY MEDICINE

## 2021-10-05 PROCEDURE — G8510 SCR DEP NEG, NO PLAN REQD: HCPCS | Performed by: FAMILY MEDICINE

## 2021-10-05 PROCEDURE — G0008 ADMIN INFLUENZA VIRUS VAC: HCPCS | Performed by: FAMILY MEDICINE

## 2021-10-05 PROCEDURE — G0439 PPPS, SUBSEQ VISIT: HCPCS | Performed by: FAMILY MEDICINE

## 2021-10-05 RX ORDER — QUETIAPINE FUMARATE 25 MG/1
25 TABLET, FILM COATED ORAL
COMMUNITY
Start: 2021-09-24 | End: 2022-01-11 | Stop reason: DRUGHIGH

## 2021-10-05 NOTE — PROGRESS NOTES
This is the Subsequent Medicare Annual Wellness Exam, performed 12 months or more after the Initial AWV or the last Subsequent AWV    I have reviewed the patient's medical history in detail and updated the computerized patient record. Chief Complaint   Patient presents with    Annual Wellness Visit     leg weakness    Chills     cold all the time     1. Have you been to the ER, urgent care clinic since your last visit? Hospitalized since your last visit? No    2. Have you seen or consulted any other health care providers outside of the [address] [place] [address] since your last visit? Include any pap smears or colon screening. No    Assessment/Plan   Education and counseling provided:  Are appropriate based on today's review and evaluation  Influenza Vaccine    1. Medicare annual wellness visit, subsequent    2. Encounter for immunization  - FLU (FLUAD QUAD INFLUENZA VACCINE,QUAD,ADJUVANTED)    3. Screen for colon cancer  - OCCULT BLOOD IMMUNOASSAY,DIAGNOSTIC; Future    4. Advanced directives, counseling/discussion    5.  Screening for depression  - [address] [facility] ANNUAL       Depression Risk Factor Screening     3 most recent PHQ Screens 10/5/2021   Little interest or pleasure in doing things Not at all   Feeling down, depressed, irritable, or hopeless Not at all   Total Score PHQ 2 0   Trouble falling or staying asleep, or sleeping too much Several days   Feeling tired or having little energy Several days   Poor appetite, weight loss, or overeating Not at all   Feeling bad about yourself - or that you are a failure or have let yourself or your family down Not at all   Trouble concentrating on things such as school, work, reading, or watching TV Not at all   Moving or speaking so slowly that other people could have noticed; or the opposite being so fidgety that others notice Not at all   Thoughts of being better off dead, or hurting yourself in some way Not at all   PHQ 9 Score 2   How difficult have these problems made it for you to do your work, take care of your home and get along with others Not difficult at all   8 minutes taken on depression screening. Alcohol Risk Screen    Do you average more than 1 drink per night or more than 7 drinks a week:  No    On any one occasion in the past three months have you have had more than 3 drinks containing alcohol:  No        Functional Ability and Level of Safety     1. Was the patient's timed Up and GO test unsteady or longer than 30 seconds? No  2. Does the patient need help with the phone, transportation, shopping, preparing meals, housework, laundry, medications or managing money? No  3. Does the patients' home have rugs in the hallway, lack grab bars in the bathroom, lack handrails on the stairs or have poor lighting? No  4. Have you noticed any hearing difficulties? No  Hearing Evaluation:      Hearing: Hearing is good. Activities of Daily Living: The home contains: handrails and grab bars  Patient needs help with:  transportation, shopping, preparing meals, laundry and housework      Ambulation: with difficulty, uses a cane     Fall Risk:  Fall Risk Assessment, last 12 mths 10/5/2021   Able to walk? Yes   Fall in past 12 months? 0   Do you feel unsteady? 0   Are you worried about falling 0   Number of falls in past 12 months -   Fall with injury?  -      Abuse Screen:  Patient is not abused       Cognitive Screening    Has your family/caregiver stated any concerns about your memory: no     Cognitive Screening: Normal - Verbal Fluency Test    Health Maintenance Due     Health Maintenance Due   Topic Date Due    DTaP/Tdap/Td series (1 - Tdap) Never done    Colorectal Cancer Screening Combo  Never done    Bone Densitometry (Dexa) Screening  Never done    COVID-19 Vaccine (2 - Moderna 2-dose series) 08/16/2021    Flu Vaccine (1) 09/01/2021    A1C test (Diabetic or Prediabetic)  10/07/2021    Medicare Yearly Exam 10/08/2021       Patient Care Team   Patient Care Team:  Stacie Ashley MD as PCP - General (Family Medicine)  Stacie Ashley MD as PCP - [facility]  Rhys Charles NP (Nurse Practitioner)    History   Kenya Ross comes in for Medicare wellness exam.    Patient Active Problem List   Diagnosis Code    H/O colon cancer, stage III Z85.038     Past Medical History:   Diagnosis Date    Hypertension     Parkinson disease [facility])       Past Surgical History:   Procedure Laterality Date    HX CATARACT REMOVAL      HX COLONOSCOPY  2019    HX HYSTERECTOMY  1990    HX SMALL BOWEL RESECTION  2008     Current Outpatient Medications   Medication Sig Dispense Refill    QUEtiapine (SEROquel) 25 mg tablet Take 25 mg by mouth nightly.  mirtazapine (REMERON) 15 mg tablet TAKE 1 TABLET BY MOUTH EVERY DAY AT NIGHT 90 Tab 1    cholecalciferol (Vitamin D3) (1000 Units /25 mcg) tablet Take  by mouth daily.  atorvastatin (LIPITOR) 10 mg tablet Take 1 Tab by mouth daily. 30 Tab 3    carbidopa-levodopa (SINEMET)  mg per tablet Take 1 Tab by mouth three (3) times daily. No Known Allergies    History reviewed. No pertinent family history. Social History     Tobacco Use    Smoking status: Never Smoker    Smokeless tobacco: Never Used   Substance Use Topics    Alcohol use: Never   I have discussed the diagnosis with the patient and the intended plan of care as seen in the above orders. The patient has received an after-visit summary and questions were answered concerning future plans. I have discussed medication, side effects, and warnings with the patient in detail. The patient verbalized understanding and is in agreement with the plan of care. The patient will contact the office with any additional concerns. Personalized preventative plan of care was discussed, printed and given to patient.     Tasha Juárez MD

## 2021-10-05 NOTE — ACP (ADVANCE CARE PLANNING)
Advance Care Planning     Advance Care Planning (ACP) Physician/NP/PA Conversation      Date of Conversation: 10/5/2021  Conducted with: Patient with Decision Making Capacity    Healthcare Decision Maker:   No healthcare decision makers have been documented. Click here to complete [address] including selection of the Healthcare Decision Maker Relationship (ie \"Primary\")  Patient has an advance care plan in place. She will avail a copy to us. States that she has DNR status.   Conversation Outcomes / Follow-Up Plan:   ACP complete - no further action today    Length of Voluntary ACP Conversation in minutes:  <16 minutes (Non-Billable)    Telma Glez MD

## 2021-10-05 NOTE — PROGRESS NOTES
[facility]  Denver Hakim comes in for f/u care. She is accompanied by her daughter. Parkinson's disease: Patient has Parkinson's disease. She has been followed up with a neurologist. She is on Sinemet. Still has shuffling gait and tremors. She is on Sinemet 3 times a day. May need to have this increased to up to 4 times a day. She will discuss this with her neurologist.  Mood disorder: Patient has mood disorder. She has been on mirtazapine and this has helped with her mood. Patient is also on Seroquel. Would like a refill of medications. We will continue with the current treatment plan. Insomnia: Patient has insomnia. She is on mirtazapine and Seroquel. Sleep habits have improved. We will continue with the current medication plan. Dyslipidemia: Patient has dyslipidemia. She is on atorvastatin. Stable on the medication. We will recheck lipid panel at next visit. Poor appetite: Patient has a history of poor appetite and weight loss. Weight has gone up 11 pounds. She is on mirtazapine. Appetite is good. Continue current treatment plan. Denies abdominal pain, abdominal distention, change in bowel habits, blood in stool. DM2: Patient has type 2 diabetes mellitus. She is not on medication for diabetes. Her HbA1c is 5.4. HM: Will give flu vaccine today. FIT stool kit given for colon cancer screening. Past Medical History  Past Medical History:   Diagnosis Date    Hypertension     Parkinson disease [facility])        Surgical History  Past Surgical History:   Procedure Laterality Date    HX CATARACT REMOVAL      HX COLONOSCOPY  2019    HX HYSTERECTOMY  1990    HX SMALL BOWEL RESECTION  2008        Medications  Current Outpatient Medications   Medication Sig Dispense Refill    QUEtiapine (SEROquel) 25 mg tablet Take 25 mg by mouth nightly.  mirtazapine (REMERON) 15 mg tablet TAKE 1 TABLET BY MOUTH EVERY DAY AT NIGHT 90 Tab 1    cholecalciferol (Vitamin D3) (1000 Units /25 mcg) tablet Take  by mouth daily.       atorvastatin (LIPITOR) 10 mg tablet Take 1 Tab by mouth daily. 30 Tab 3    carbidopa-levodopa (SINEMET)  mg per tablet Take 1 Tab by mouth three (3) times daily. Allergies  No Known Allergies    Family History  History reviewed. No pertinent family history. Social History  Social History     Socioeconomic History    Marital status:      Spouse name: Not on file    Number of children: Not on file    Years of education: Not on file    Highest education level: Not on file   Occupational History    Not on file   Tobacco Use    Smoking status: Never Smoker    Smokeless tobacco: Never Used   Vaping Use    Vaping Use: Never used   Substance and Sexual Activity    Alcohol use: Never    Drug use: Never    Sexual activity: Not Currently   Other Topics Concern    Not on file   Social History Narrative    Not on file     Social Determinants of Health     Financial Resource Strain:     Difficulty of Paying Living Expenses:    Food Insecurity:     Worried About Running Out of Food in the Last Year:     [address] in the Last Year:    Transportation Needs:     Lack of Transportation (Medical):  Lack of Transportation (Non-Medical):    Physical Activity:     Days of Exercise per Week:     Minutes of Exercise per Session:    Stress:     Feeling of Stress :    Social Connections:     Frequency of Communication with Friends and Family:     Frequency of Social Gatherings with Friends and Family:     Attends Zoroastrianism Services:     Active Member of Clubs or Organizations:     Attends Club or Organization Meetings:     Marital Status:    Intimate Partner Violence:     Fear of Current or Ex-Partner:     Emotionally Abused:     Physically Abused:     Sexually Abused:        Review of Systems  Review of Systems - Review of all systems is negative except as noted above in the HPI.     Vital Signs  Visit Vitals  /77 (BP 1 Location: Left upper arm, BP Patient Position: Sitting, BP Cuff Size: Adult)   Pulse 84   Temp 97.9 °F (36.6 °C) (Oral)   Resp 16   Ht 5' 4\" (1.626 m)   Wt 140 lb (63.5 kg)   SpO2 97%   BMI 24.03 kg/m²         Physical Exam  Physical Examination: General appearance - oriented to person, place, and time and acyanotic, in no respiratory distress  Mental status - affect appropriate to mood  Neck - supple, no significant adenopathy  Lymphatics - no palpable lymphadenopathy  Chest - no tachypnea, retractions or cyanosis  Heart - S1 and S2 normal  Abdomen - no rebound tenderness noted  Back exam - limited range of motion  Neurological - abnormal neurological exam unchanged from prior examinations, has tremors and shuffling gait  Musculoskeletal - osteoarthritic changes noted in both hands  Extremities - intact peripheral pulses      Results  Results for orders placed or performed during the hospital encounter of 04/14/21   CBC WITH AUTOMATED DIFF   Result Value Ref Range    WBC 6.5 4.6 - 13.2 K/uL    RBC 4.32 4.20 - 5.30 M/uL    HGB 10.8 (L) 12.0 - 16.0 g/dL    HCT 36.4 35.0 - 45.0 %    MCV 84.3 74.0 - 97.0 FL    MCH 25.0 24.0 - 34.0 PG    MCHC 29.7 (L) 31.0 - 37.0 g/dL    RDW 15.2 (H) 11.6 - 14.5 %    PLATELET 724 430 - 002 K/uL    MPV 12.0 (H) 9.2 - 11.8 FL    NEUTROPHILS 58 40 - 73 %    LYMPHOCYTES 33 21 - 52 %    MONOCYTES 7 3 - 10 %    EOSINOPHILS 2 0 - 5 %    BASOPHILS 1 0 - 2 %    ABS. NEUTROPHILS 3.8 1.8 - 8.0 K/UL    ABS. LYMPHOCYTES 2.2 0.9 - 3.6 K/UL    ABS. MONOCYTES 0.4 0.05 - 1.2 K/UL    ABS. EOSINOPHILS 0.1 0.0 - 0.4 K/UL    ABS.  BASOPHILS 0.0 0.0 - 0.1 K/UL    DF AUTOMATED     METABOLIC PANEL, COMPREHENSIVE   Result Value Ref Range    Sodium 144 136 - 145 mmol/L    Potassium 4.1 3.5 - 5.5 mmol/L    Chloride 109 100 - 111 mmol/L    CO2 28 21 - 32 mmol/L    Anion gap 7 3.0 - 18 mmol/L    Glucose 88 74 - 99 mg/dL    BUN 21 (H) 7.0 - 18 MG/DL    Creatinine 0.94 0.6 - 1.3 MG/DL    BUN/Creatinine ratio 22 (H) 12 - 20      GFR est AA >60 >60 ml/min/1.73m2 GFR est non-AA 58 (L) >60 ml/min/1.73m2    Calcium 9.1 8.5 - 10.1 MG/DL    Bilirubin, total 0.6 0.2 - 1.0 MG/DL    ALT (SGPT) 22 13 - 56 U/L    AST (SGOT) 14 10 - 38 U/L    Alk. phosphatase 81 45 - 117 U/L    Protein, total 6.7 6.4 - 8.2 g/dL    Albumin 3.6 3.4 - 5.0 g/dL    Globulin 3.1 2.0 - 4.0 g/dL    A-G Ratio 1.2 0.8 - 1.7     LIPID PANEL   Result Value Ref Range    LIPID PROFILE          Cholesterol, total 127 <200 MG/DL    Triglyceride 60 <150 MG/DL    HDL Cholesterol 60 40 - 60 MG/DL    LDL, calculated 55 0 - 100 MG/DL    VLDL, calculated 12 MG/DL    CHOL/HDL Ratio 2.1 0 - 5.0     TSH 3RD GENERATION   Result Value Ref Range    TSH 0.36 0.36 - 3.74 uIU/mL   VITAMIN D, 25 HYDROXY   Result Value Ref Range    Vitamin D 25-Hydroxy 61.2 30 - 100 ng/mL       ASSESSMENT and PLAN    ICD-10-CM ICD-9-CM    1. Parkinson's disease ([facility] [address]  G20 332.0    2. Dyslipidemia  E78.5 272.4    3. Mood disorder (HCC)  F39 296.90    4. Poor appetite  R63.0 783.0    5. Weight disorder  R63.8 783.9    6. Hyperglycemia  R73.9 790.29 AMB POC HEMOGLOBIN A1C   7. Medicare annual wellness visit, subsequent  Z00.00 V70.0    8. Encounter for immunization  Z23 V03.89 FLU (FLUAD QUAD INFLUENZA VACCINE,QUAD,ADJUVANTED)   9. Screen for colon cancer  Z12.11 V76.51 OCCULT BLOOD IMMUNOASSAY,DIAGNOSTIC   10. Advanced directives, counseling/discussion  Z71.89 V65.49    11.  Screening for depression  Z13.31 V79.0 [address]     lab results and schedule of future lab studies reviewed with patient  reviewed diet, exercise and weight control  cardiovascular risk and specific lipid/LDL goals reviewed  reviewed medications and side effects in detail  specific diabetic recommendations: low cholesterol diet, weight control and daily exercise discussed, home glucose monitoring emphasized, all medications, side effects and compliance discussed carefully, annual eye examinations at Ophthalmology discussed, glycohemoglobin and other lab monitoring discussed and long term diabetic complications discussed      I have discussed the diagnosis with the patient and the intended plan of care as seen in the above orders. The patient has received an after-visit summary and questions were answered concerning future plans. I have discussed medication, side effects, and warnings with the patient in detail. The patient verbalized understanding and is in agreement with the plan of care. The patient will contact the office with any additional concerns. Cielo Taylor MD    PLEASE NOTE:   This document has been produced using voice recognition software.  Unrecognized errors in transcription may be present

## 2021-10-05 NOTE — PATIENT INSTRUCTIONS
Medicare Wellness Visit, Female     The best way to live healthy is to have a lifestyle where you eat a well-balanced diet, exercise regularly, limit alcohol use, and quit all forms of tobacco/nicotine, if applicable. Regular preventive services are another way to keep healthy. Preventive services (vaccines, screening tests, monitoring & exams) can help personalize your care plan, which helps you manage your own care. Screening tests can find health problems at the earliest stages, when they are easiest to treat. [facility] follows the current, evidence-based guidelines published by the [facility] ([facility]) when recommending preventive services for our patients. Because we follow these guidelines, sometimes recommendations change over time as research supports it. (For example, mammograms used to be recommended annually. Even though Medicare will still pay for an annual mammogram, the newer guidelines recommend a mammogram every two years for women of average risk). Of course, you and your doctor may decide to screen more often for some diseases, based on your risk and your co-morbidities (chronic disease you are already diagnosed with). Preventive services for you include:  - Medicare offers their members a free annual wellness visit, which is time for you and your primary care provider to discuss and plan for your preventive service needs. Take advantage of this benefit every year!  -All adults over the age of 72 should receive the recommended pneumonia vaccines. Current USPSTF guidelines recommend a series of two vaccines for the best pneumonia protection.   -All adults should have a flu vaccine yearly and a tetanus vaccine every 10 years.   -All adults age 48 and older should receive the shingles vaccines (series of two vaccines).       -All adults age 38-68 who are overweight should have a diabetes screening test once every three years.   -All adults born between 80 and 1965 should be screened once for Hepatitis C.  -Other screening tests and preventive services for persons with diabetes include: an eye exam to screen for diabetic retinopathy, a kidney function test, a foot exam, and stricter control over your cholesterol.   -Cardiovascular screening for adults with routine risk involves an electrocardiogram (ECG) at intervals determined by your doctor.   -Colorectal cancer screenings should be done for adults age 54-65 with no increased risk factors for colorectal cancer. There are a number of acceptable methods of screening for this type of cancer. Each test has its own benefits and drawbacks. Discuss with your doctor what is most appropriate for you during your annual wellness visit. The different tests include: colonoscopy (considered the best screening method), a fecal occult blood test, a fecal DNA test, and sigmoidoscopy.    -A bone mass density test is recommended when a woman turns 65 to screen for osteoporosis. This test is only recommended one time, as a screening. Some providers will use this same test as a disease monitoring tool if you already have osteoporosis. -Breast cancer screenings are recommended every other year for women of normal risk, age 54-69.  -Cervical cancer screenings for women over age 72 are only recommended with certain risk factors.      Here is a list of your current Health Maintenance items (your personalized list of preventive services) with a due date:  Health Maintenance Due   Topic Date Due    DTaP/Tdap/Td  (1 - Tdap) Never done    Colorectal Screening  Never done    Bone Mineral Density   Never done    COVID-19 Vaccine (2 - Moderna 2-dose series) 08/16/2021    Yearly Flu Vaccine (1) 09/01/2021    Hemoglobin A1C    10/07/2021    Annual Well Visit  10/08/2021         Medicare Wellness Visit, Female     The best way to live healthy is to have a lifestyle where you eat a well-balanced diet, exercise regularly, limit alcohol use, and quit all forms of tobacco/nicotine, if applicable. Regular preventive services are another way to keep healthy. Preventive services (vaccines, screening tests, monitoring & exams) can help personalize your care plan, which helps you manage your own care. Screening tests can find health problems at the earliest stages, when they are easiest to treat. [facility] follows the current, evidence-based guidelines published by the [facility] ([facility]) when recommending preventive services for our patients. Because we follow these guidelines, sometimes recommendations change over time as research supports it. (For example, mammograms used to be recommended annually. Even though Medicare will still pay for an annual mammogram, the newer guidelines recommend a mammogram every two years for women of average risk). Of course, you and your doctor may decide to screen more often for some diseases, based on your risk and your co-morbidities (chronic disease you are already diagnosed with). Preventive services for you include:  - Medicare offers their members a free annual wellness visit, which is time for you and your primary care provider to discuss and plan for your preventive service needs. Take advantage of this benefit every year!  -All adults over the age of 72 should receive the recommended pneumonia vaccines. Current USPSTF guidelines recommend a series of two vaccines for the best pneumonia protection.   -All adults should have a flu vaccine yearly and a tetanus vaccine every 10 years.   -All adults age 48 and older should receive the shingles vaccines (series of two vaccines).       -All adults age 38-68 who are overweight should have a diabetes screening test once every three years.   -All adults born between 80 and 1965 should be screened once for Hepatitis C.  -Other screening tests and preventive services for persons with diabetes include: an eye exam to screen for diabetic retinopathy, a kidney function test, a foot exam, and stricter control over your cholesterol.   -Cardiovascular screening for adults with routine risk involves an electrocardiogram (ECG) at intervals determined by your doctor.   -Colorectal cancer screenings should be done for adults age 54-65 with no increased risk factors for colorectal cancer. There are a number of acceptable methods of screening for this type of cancer. Each test has its own benefits and drawbacks. Discuss with your doctor what is most appropriate for you during your annual wellness visit. The different tests include: colonoscopy (considered the best screening method), a fecal occult blood test, a fecal DNA test, and sigmoidoscopy.    -A bone mass density test is recommended when a woman turns 65 to screen for osteoporosis. This test is only recommended one time, as a screening. Some providers will use this same test as a disease monitoring tool if you already have osteoporosis. -Breast cancer screenings are recommended every other year for women of normal risk, age 54-69.  -Cervical cancer screenings for women over age 72 are only recommended with certain risk factors.      Here is a list of your current Health Maintenance items (your personalized list of preventive services) with a due date:  Health Maintenance Due   Topic Date Due    DTaP/Tdap/Td  (1 - Tdap) Never done    Colorectal Screening  Never done    Bone Mineral Density   Never done

## 2021-10-11 ENCOUNTER — HOSPITAL ENCOUNTER (OUTPATIENT)
Dept: LAB | Age: 76
Discharge: HOME | End: 2021-10-11
Payer: MEDICARE

## 2021-10-11 DIAGNOSIS — Z12.11 SCREEN FOR COLON CANCER: ICD-10-CM

## 2021-10-11 PROCEDURE — 82274 ASSAY TEST FOR BLOOD FECAL: CPT

## 2021-10-12 LAB — HEMOCCULT STL QL IA: NEGATIVE

## 2021-10-26 DIAGNOSIS — R63.0 POOR APPETITE: ICD-10-CM

## 2021-10-26 RX ORDER — MIRTAZAPINE 15 MG/1
TABLET, FILM COATED ORAL
Qty: 90 TABLET | Refills: 1 | Status: SHIPPED | OUTPATIENT
Start: 2021-10-26 | End: 2022-08-08 | Stop reason: SDUPTHER

## 2022-01-11 ENCOUNTER — OFFICE VISIT (OUTPATIENT)
Dept: FAMILY MEDICINE CLINIC | Age: 77
End: 2022-01-11
Payer: MEDICARE

## 2022-01-11 VITALS
RESPIRATION RATE: 20 BRPM | SYSTOLIC BLOOD PRESSURE: 135 MMHG | OXYGEN SATURATION: 100 % | BODY MASS INDEX: 23.56 KG/M2 | HEIGHT: 64 IN | HEART RATE: 77 BPM | TEMPERATURE: 98.2 F | WEIGHT: 138 LBS | DIASTOLIC BLOOD PRESSURE: 85 MMHG

## 2022-01-11 DIAGNOSIS — E78.5 DYSLIPIDEMIA: Primary | ICD-10-CM

## 2022-01-11 DIAGNOSIS — I10 ESSENTIAL HYPERTENSION: ICD-10-CM

## 2022-01-11 DIAGNOSIS — F39 MOOD DISORDER (HCC): ICD-10-CM

## 2022-01-11 DIAGNOSIS — G47.00 INSOMNIA, UNSPECIFIED TYPE: ICD-10-CM

## 2022-01-11 DIAGNOSIS — R44.3 HALLUCINATIONS: ICD-10-CM

## 2022-01-11 DIAGNOSIS — G20 PARKINSON'S DISEASE (HCC): ICD-10-CM

## 2022-01-11 DIAGNOSIS — R63.0 POOR APPETITE: ICD-10-CM

## 2022-01-11 PROCEDURE — 3017F COLORECTAL CA SCREEN DOC REV: CPT | Performed by: FAMILY MEDICINE

## 2022-01-11 PROCEDURE — 1101F PT FALLS ASSESS-DOCD LE1/YR: CPT | Performed by: FAMILY MEDICINE

## 2022-01-11 PROCEDURE — G8400 PT W/DXA NO RESULTS DOC: HCPCS | Performed by: FAMILY MEDICINE

## 2022-01-11 PROCEDURE — 99214 OFFICE O/P EST MOD 30 MIN: CPT | Performed by: FAMILY MEDICINE

## 2022-01-11 PROCEDURE — G8510 SCR DEP NEG, NO PLAN REQD: HCPCS | Performed by: FAMILY MEDICINE

## 2022-01-11 PROCEDURE — G8754 DIAS BP LESS 90: HCPCS | Performed by: FAMILY MEDICINE

## 2022-01-11 PROCEDURE — G8536 NO DOC ELDER MAL SCRN: HCPCS | Performed by: FAMILY MEDICINE

## 2022-01-11 PROCEDURE — G8752 SYS BP LESS 140: HCPCS | Performed by: FAMILY MEDICINE

## 2022-01-11 PROCEDURE — G8420 CALC BMI NORM PARAMETERS: HCPCS | Performed by: FAMILY MEDICINE

## 2022-01-11 PROCEDURE — 1090F PRES/ABSN URINE INCON ASSESS: CPT | Performed by: FAMILY MEDICINE

## 2022-01-11 PROCEDURE — G8427 DOCREV CUR MEDS BY ELIG CLIN: HCPCS | Performed by: FAMILY MEDICINE

## 2022-01-11 RX ORDER — QUETIAPINE FUMARATE 50 MG/1
50 TABLET, FILM COATED ORAL
Qty: 30 TABLET | Refills: 5 | Status: SHIPPED | OUTPATIENT
Start: 2022-01-11 | End: 2022-01-18 | Stop reason: SDUPTHER

## 2022-01-11 RX ORDER — MELATONIN 5 MG
5 CAPSULE ORAL
Qty: 30 CAPSULE | Refills: 5 | Status: SHIPPED | OUTPATIENT
Start: 2022-01-11

## 2022-01-11 NOTE — PROGRESS NOTES
Chief Complaint   Patient presents with    Insomnia     with sickness from meds    Leg Pain     1. \"Have you been to the ER, urgent care clinic since your last visit? Hospitalized since your last visit? \" No    2. \"Have you seen or consulted any other health care providers outside of the [address] [place] [address] since your last visit? \" No     3. For patients aged 39-70: Has the patient had a colonoscopy / FIT/ Cologuard? Yes, HM satisfied with blue hyperlink     If the patient is female:    4. For patients aged 41-77: Has the patient had a mammogram within the past 2 years? Yes, HM satisfied with blue hyperlink    5. For patients aged 21-65: Has the patient had a pap smear? Yes, HM satisfied with blue hyperlink    [facility]  Sobia Saleh comes in for f/u care. Parkinson's disease: Patient has Parkinson's disease. She is on Sinemet. She continues to have tremors. She walks using a cane. We discussed the need to take medication as prescribed. She needs to follow-up with the neurologist.  Nausea: Patient has occasional nausea. Denies abdominal pain, fever or chills. No vomiting. She states that this comes after she has taken medications and wonders whether there is a medication that triggers the symptoms. I will check labs. Dyslipidemia: Patient has dyslipidemia. She is on atorvastatin. Tolerating medication. We will recheck lipid panel. She takes a diet low in polysaturated fats. Poor appetite: Patient has a history of poor appetite. This has improved since she started taking the Remeron. She will continue with the medication. Mood disorder: Patient has history of mood disorder. She has hallucinations at night. She is on Seroquel for this. She takes 25 mg. Initially this was helping but hallucinations have recurred. They occur on and off. We will increase her Seroquel to 50 mg.  I will check labs. She denies headache, changes in vision or focal weakness. Insomnia: Patient has insomnia.   We discussed management options. She currently is on Seroquel. I will add melatonin. I will follow-up at next visit. Hypertension: Patient's blood pressure is stable. Previously she was on HCTZ but her blood pressure was dropping. It has remained stable off the medication. She will continue with supportive care. Past Medical History  Past Medical History:   Diagnosis Date    Hypertension     Parkinson disease ([address]        Surgical History  Past Surgical History:   Procedure Laterality Date    HX CATARACT REMOVAL      HX COLONOSCOPY  2019    HX HYSTERECTOMY  1990    HX SMALL BOWEL RESECTION  2008        Medications  Current Outpatient Medications   Medication Sig Dispense Refill    mirtazapine (REMERON) 15 mg tablet TAKE 1 TABLET BY MOUTH EVERY DAY AT NIGHT 90 Tablet 1    QUEtiapine (SEROquel) 25 mg tablet Take 25 mg by mouth nightly.  cholecalciferol (Vitamin D3) (1000 Units /25 mcg) tablet Take  by mouth daily.  atorvastatin (LIPITOR) 10 mg tablet Take 1 Tab by mouth daily. 30 Tab 3    carbidopa-levodopa (SINEMET)  mg per tablet Take 1 Tab by mouth three (3) times daily. Allergies  No Known Allergies    Family History  History reviewed. No pertinent family history.     Social History  Social History     Socioeconomic History    Marital status:      Spouse name: Not on file    Number of children: Not on file    Years of education: Not on file    Highest education level: Not on file   Occupational History    Not on file   Tobacco Use    Smoking status: Never Smoker    Smokeless tobacco: Never Used   Vaping Use    Vaping Use: Never used   Substance and Sexual Activity    Alcohol use: Never    Drug use: Never    Sexual activity: Not Currently   Other Topics Concern    Not on file   Social History Narrative    Not on file     Social Determinants of Health     Financial Resource Strain:     Difficulty of Paying Living Expenses: Not on file   Food Insecurity:  Worried About Running Out of Food in the Last Year: Not on file    [place] of Food in the Last Year: Not on file   Transportation Needs:     Lack of Transportation (Medical): Not on file    Lack of Transportation (Non-Medical): Not on file   Physical Activity:     Days of Exercise per Week: Not on file    Minutes of Exercise per Session: Not on file   Stress:     Feeling of Stress : Not on file   Social Connections:     Frequency of Communication with Friends and Family: Not on file    Frequency of Social Gatherings with Friends and Family: Not on file    Attends [employer] Services: Not on file    Active Member of [address] [place] [address] [employer] or Organizations: Not on file    Attends Club or Organization Meetings: Not on file    Marital Status: Not on file   Intimate Partner Violence:     Fear of Current or Ex-Partner: Not on file    Emotionally Abused: Not on file    Physically Abused: Not on file    Sexually Abused: Not on file   Housing Stability:     Unable to Pay for Housing in the Last Year: Not on file    Number of [place] in the Last Year: Not on file    Unstable Housing in the Last Year: Not on file       Review of Systems  Review of Systems - Review of all systems is negative except as noted above in the HPI.     Vital Signs  Visit Vitals  /85 (BP 1 Location: Left upper arm, BP Patient Position: Sitting, BP Cuff Size: Adult)   Pulse 77   Temp 98.2 °F (36.8 °C) (Oral)   Resp 20   Ht 5' 4\" (1.626 m)   Wt 138 lb (62.6 kg)   SpO2 100%   BMI 23.69 kg/m²         Physical Exam  Physical Examination: General appearance - oriented to person, place, and time and acyanotic, in no respiratory distress  Mental status - affect appropriate to mood  Neck - supple, no significant adenopathy  Lymphatics - no palpable lymphadenopathy  Chest - no tachypnea, retractions or cyanosis  Heart - S1 and S2 normal  Abdomen - no rebound tenderness noted  Back exam - limited range of motion  Neurological - abnormal neurological exam unchanged from prior examinations  Musculoskeletal - osteoarthritic changes noted in both hands  Extremities - no pedal edema noted, intact peripheral pulses      Results  Results for orders placed or performed during the hospital encounter of 10/11/21   OCCULT BLOOD IMMUNOASSAY,DIAGNOSTIC   Result Value Ref Range    Occult blood fecal, by IA Negative Negative         ASSESSMENT and PLAN    ICD-10-CM ICD-9-CM    1. Dyslipidemia  E78.5 272.4 LIPID PANEL   2. Parkinson's disease ([facility] [address]  G20 332.0    3. Mood disorder (HCC)  F39 296.90    4. Poor appetite  R63.0 783.0    5. Essential hypertension  Z09 224.3 METABOLIC PANEL, COMPREHENSIVE      CBC WITH AUTOMATED DIFF   6. Insomnia, unspecified type  G47.00 780.52 QUEtiapine (SEROquel) 50 mg tablet      melatonin 5 mg cap capsule   7. Hallucinations  R44.3 780.1 QUEtiapine (SEROquel) 50 mg tablet     lab results and schedule of future lab studies reviewed with patient  reviewed diet, exercise and weight control  cardiovascular risk and specific lipid/LDL goals reviewed  reviewed medications and side effects in detail      I have discussed the diagnosis with the patient and the intended plan of care as seen in the above orders. The patient has received an after-visit summary and questions were answered concerning future plans. I have discussed medication, side effects, and warnings with the patient in detail. The patient verbalized understanding and is in agreement with the plan of care. The patient will contact the office with any additional concerns. Ambrose Daley MD    PLEASE NOTE:   This document has been produced using voice recognition software.  Unrecognized errors in transcription may be present

## 2022-01-18 DIAGNOSIS — G47.00 INSOMNIA, UNSPECIFIED TYPE: ICD-10-CM

## 2022-01-18 DIAGNOSIS — R44.3 HALLUCINATIONS: ICD-10-CM

## 2022-01-18 RX ORDER — QUETIAPINE FUMARATE 50 MG/1
50 TABLET, FILM COATED ORAL
Qty: 30 TABLET | Refills: 5 | Status: SHIPPED | OUTPATIENT
Start: 2022-01-18 | End: 2022-03-31 | Stop reason: SDUPTHER

## 2022-01-25 LAB
A-G RATIO,AGRAT: 1.3 RATIO (ref 1.1–2.6)
ABSOLUTE LYMPHOCYTE COUNT, 10803: 2.3 K/UL (ref 1–4.8)
ALBUMIN SERPL-MCNC: 4.3 G/DL (ref 3.5–5)
ALP SERPL-CCNC: 103 U/L (ref 40–120)
ALT SERPL-CCNC: 13 U/L (ref 5–40)
ANION GAP SERPL CALC-SCNC: 7 MMOL/L (ref 3–15)
AST SERPL W P-5'-P-CCNC: 13 U/L (ref 10–37)
BASOPHILS # BLD: 0 K/UL (ref 0–0.2)
BASOPHILS NFR BLD: 1 % (ref 0–2)
BILIRUB SERPL-MCNC: 0.4 MG/DL (ref 0.2–1.2)
BUN SERPL-MCNC: 17 MG/DL (ref 6–22)
CALCIUM SERPL-MCNC: 9.9 MG/DL (ref 8.4–10.5)
CHLORIDE SERPL-SCNC: 107 MMOL/L (ref 98–110)
CHOLEST SERPL-MCNC: 161 MG/DL (ref 110–200)
CO2 SERPL-SCNC: 30 MMOL/L (ref 20–32)
CREAT SERPL-MCNC: 1 MG/DL (ref 0.8–1.4)
EOSINOPHIL # BLD: 0.2 K/UL (ref 0–0.5)
EOSINOPHIL NFR BLD: 4 % (ref 0–6)
ERYTHROCYTE [DISTWIDTH] IN BLOOD BY AUTOMATED COUNT: 14.7 % (ref 10–15.5)
GFRAA, 66117: >60
GFRNA, 66118: 51.7
GLOBULIN,GLOB: 3.4 G/DL (ref 2–4)
GLUCOSE SERPL-MCNC: 97 MG/DL (ref 70–99)
GRANULOCYTES,GRANS: 55 % (ref 40–75)
HCT VFR BLD AUTO: 41.7 % (ref 35.1–48.3)
HDLC SERPL-MCNC: 2.3 MG/DL (ref 0–5)
HDLC SERPL-MCNC: 69 MG/DL
HGB BLD-MCNC: 12.8 G/DL (ref 11.7–16.1)
LDL/HDL RATIO,LDHD: 1.2
LDLC SERPL CALC-MCNC: 81 MG/DL (ref 50–99)
LYMPHOCYTES, LYMLT: 34 % (ref 20–45)
MCH RBC QN AUTO: 26 PG (ref 26–34)
MCHC RBC AUTO-ENTMCNC: 31 G/DL (ref 31–36)
MCV RBC AUTO: 85 FL (ref 80–99)
MONOCYTES # BLD: 0.5 K/UL (ref 0.1–1)
MONOCYTES NFR BLD: 7 % (ref 3–12)
NEUTROPHILS # BLD AUTO: 3.8 K/UL (ref 1.8–7.7)
NON-HDL CHOLESTEROL, 011976: 92 MG/DL
PLATELET # BLD AUTO: 250 K/UL (ref 140–440)
PMV BLD AUTO: 11.9 FL (ref 9–13)
POTASSIUM SERPL-SCNC: 4.5 MMOL/L (ref 3.5–5.5)
PROT SERPL-MCNC: 7.7 G/DL (ref 6.2–8.1)
RBC # BLD AUTO: 4.89 M/UL (ref 3.8–5.2)
SODIUM SERPL-SCNC: 144 MMOL/L (ref 133–145)
TRIGL SERPL-MCNC: 56 MG/DL (ref 40–149)
VLDLC SERPL CALC-MCNC: 11 MG/DL (ref 8–30)
WBC # BLD AUTO: 6.9 K/UL (ref 4–11)

## 2022-02-08 ENCOUNTER — OFFICE VISIT (OUTPATIENT)
Dept: FAMILY MEDICINE CLINIC | Age: 77
End: 2022-02-08
Payer: MEDICARE

## 2022-02-08 VITALS
HEART RATE: 79 BPM | OXYGEN SATURATION: 99 % | RESPIRATION RATE: 20 BRPM | HEIGHT: 64 IN | SYSTOLIC BLOOD PRESSURE: 104 MMHG | BODY MASS INDEX: 24.07 KG/M2 | WEIGHT: 141 LBS | TEMPERATURE: 98 F | DIASTOLIC BLOOD PRESSURE: 66 MMHG

## 2022-02-08 DIAGNOSIS — I10 ESSENTIAL HYPERTENSION: ICD-10-CM

## 2022-02-08 DIAGNOSIS — G20 PARKINSON'S DISEASE (HCC): ICD-10-CM

## 2022-02-08 DIAGNOSIS — E78.5 DYSLIPIDEMIA: Primary | ICD-10-CM

## 2022-02-08 DIAGNOSIS — F39 MOOD DISORDER (HCC): ICD-10-CM

## 2022-02-08 PROCEDURE — G8754 DIAS BP LESS 90: HCPCS | Performed by: FAMILY MEDICINE

## 2022-02-08 PROCEDURE — 99213 OFFICE O/P EST LOW 20 MIN: CPT | Performed by: FAMILY MEDICINE

## 2022-02-08 PROCEDURE — G8400 PT W/DXA NO RESULTS DOC: HCPCS | Performed by: FAMILY MEDICINE

## 2022-02-08 PROCEDURE — 3017F COLORECTAL CA SCREEN DOC REV: CPT | Performed by: FAMILY MEDICINE

## 2022-02-08 PROCEDURE — 1090F PRES/ABSN URINE INCON ASSESS: CPT | Performed by: FAMILY MEDICINE

## 2022-02-08 PROCEDURE — 1101F PT FALLS ASSESS-DOCD LE1/YR: CPT | Performed by: FAMILY MEDICINE

## 2022-02-08 PROCEDURE — G8536 NO DOC ELDER MAL SCRN: HCPCS | Performed by: FAMILY MEDICINE

## 2022-02-08 PROCEDURE — G8510 SCR DEP NEG, NO PLAN REQD: HCPCS | Performed by: FAMILY MEDICINE

## 2022-02-08 PROCEDURE — G8420 CALC BMI NORM PARAMETERS: HCPCS | Performed by: FAMILY MEDICINE

## 2022-02-08 PROCEDURE — G8752 SYS BP LESS 140: HCPCS | Performed by: FAMILY MEDICINE

## 2022-02-08 PROCEDURE — G8427 DOCREV CUR MEDS BY ELIG CLIN: HCPCS | Performed by: FAMILY MEDICINE

## 2022-02-08 NOTE — PROGRESS NOTES
YOANDY Maheremile Brown comes in for follow-up care. She is accompanied by a caregiver. Parkinson's disease: Patient has a history of Parkinson's disease. She is on Sinemet. Stable on the medication. Has been followed up by neurologist.  Continue current management plan. Hypertension: Patient has been stable on medication. Previously on HCTZ. She has been off medication and blood pressure remained stable. We will continue with supportive care. Labs have been stable. Mood disorder: Patient has a history of mood disorder. Has a history of hallucinations at night. We increased her Seroquel to 50 mg. She has been stable on this medication. We will continue current treatment plan. Dyslipidemia: Patient has dyslipidemia. She is on Lipitor. Stable on the medication. Lipid panel was stable. She will continue to take a diet low in polysaturated fats. Insomnia: Patient has insomnia. She is on Seroquel and melatonin. Stable on medication. Continue current treatment plan. Past Medical History  Past Medical History:   Diagnosis Date    Hypertension     Parkinson disease [facility])        Surgical History  Past Surgical History:   Procedure Laterality Date    HX CATARACT REMOVAL      HX COLONOSCOPY  2019    HX HYSTERECTOMY  1990    HX SMALL BOWEL RESECTION  2008        Medications  Current Outpatient Medications   Medication Sig Dispense Refill    QUEtiapine (SEROquel) 50 mg tablet Take 1 Tablet by mouth nightly. 30 Tablet 5    melatonin 5 mg cap capsule Take 1 Capsule by mouth nightly. 30 Capsule 5    mirtazapine (REMERON) 15 mg tablet TAKE 1 TABLET BY MOUTH EVERY DAY AT NIGHT 90 Tablet 1    cholecalciferol (Vitamin D3) (1000 Units /25 mcg) tablet Take  by mouth daily.  atorvastatin (LIPITOR) 10 mg tablet Take 1 Tab by mouth daily. 30 Tab 3    carbidopa-levodopa (SINEMET)  mg per tablet Take 1 Tab by mouth three (3) times daily.          Allergies  No Known Allergies    Family History  History reviewed. No pertinent family history. Social History  Social History     Socioeconomic History    Marital status:      Spouse name: Not on file    Number of children: Not on file    Years of education: Not on file    Highest education level: Not on file   Occupational History    Not on file   Tobacco Use    Smoking status: Never Smoker    Smokeless tobacco: Never Used   Vaping Use    Vaping Use: Never used   Substance and Sexual Activity    Alcohol use: Never    Drug use: Never    Sexual activity: Not Currently   Other Topics Concern    Not on file   Social History Narrative    Not on file     Social Determinants of Health     Financial Resource Strain:     Difficulty of Paying Living Expenses: Not on file   Food Insecurity:     Worried About [address] in the Last Year: Not on file    [address] [employer] in the Last Year: Not on file   Transportation Needs:     Lack of Transportation (Medical): Not on file    Lack of Transportation (Non-Medical):  Not on file   Physical Activity:     Days of Exercise per Week: Not on file    Minutes of Exercise per Session: Not on file   Stress:     Feeling of Stress : Not on file   Social Connections:     Frequency of Communication with Friends and Family: Not on file    Frequency of Social Gatherings with Friends and Family: Not on file    Attends Manda[employer] Services: Not on file    Active Member of [address] [place] [address] [employer] or Organizations: Not on file    Attends Club or Organization Meetings: Not on file    Marital Status: Not on file   Intimate Partner Violence:     Fear of Current or Ex-Partner: Not on file    Emotionally Abused: Not on file    Physically Abused: Not on file    Sexually Abused: Not on file   Housing Stability:     Unable to Pay for Housing in the Last Year: Not on file    Number of [place] in the Last Year: Not on file    Unstable Housing in the Last Year: Not on file       Review of Systems  Review of Systems - Review of all systems is negative except as noted above in the HPI. Vital Signs  Visit Vitals  /66 (BP 1 Location: Left upper arm, BP Patient Position: Sitting, BP Cuff Size: Adult)   Pulse 79   Temp 98 °F (36.7 °C) (Oral)   Resp 20   Ht 5' 4\" (1.626 m)   Wt 141 lb (64 kg)   SpO2 99%   BMI 24.20 kg/m²         Physical Exam  Physical Examination: General appearance - acyanotic, in no respiratory distress  Mental status - affect appropriate to mood  Chest - no tachypnea, retractions or cyanosis  Heart - normal rate and regular rhythm  Abdomen - no rebound tenderness noted  Back exam - limited range of motion  Neurological - abnormal neurological exam unchanged from prior examinations  Musculoskeletal - osteoarthritic changes noted in both hands  Extremities - intact peripheral pulses      Results  Results for orders placed or performed in visit on 01/25/22   LIPID PANEL   Result Value Ref Range    Triglyceride 56 40 - 149 mg/dL    HDL Cholesterol 69 >=40 mg/dL    Cholesterol, total 161 110 - 200 mg/dL    CHOLESTEROL/HDL 2.3 0.0 - 5.0    Non-HDL Cholesterol 92 <130 mg/dL    LDL, calculated 81 50 - 99 mg/dL    VLDL, calculated 11 8 - 30 mg/dL    LDL/HDL Ratio 1.2    CBC WITH AUTOMATED DIFF   Result Value Ref Range    WBC 6.9 4.0 - 11.0 K/uL    RBC 4.89 3.80 - 5.20 M/uL    HGB 12.8 11.7 - 16.1 g/dL    HCT 41.7 35.1 - 48.3 %    MCV 85 80 - 99 fL    MCH 26 26 - 34 pg    MCHC 31 31 - 36 g/dL    RDW 14.7 10.0 - 15.5 %    PLATELET 573 863 - 130 K/uL    MPV 11.9 9.0 - 13.0 fL    NEUTROPHILS 55 40 - 75 %    Lymphocytes 34 20 - 45 %    MONOCYTES 7 3 - 12 %    EOSINOPHILS 4 0 - 6 %    BASOPHILS 1 0 - 2 %    ABS. NEUTROPHILS 3.8 1.8 - 7.7 K/uL    ABSOLUTE LYMPHOCYTE COUNT 2.3 1.0 - 4.8 K/uL    ABS. MONOCYTES 0.5 0.1 - 1.0 K/uL    ABS. EOSINOPHILS 0.2 0.0 - 0.5 K/uL    ABS.  BASOPHILS 0.0 0.0 - 0.2 K/uL   METABOLIC PANEL, COMPREHENSIVE   Result Value Ref Range    Glucose 97 70 - 99 mg/dL    BUN 17 6 - 22 mg/dL Creatinine 1.0 0.8 - 1.4 mg/dL    Sodium 144 133 - 145 mmol/L    Potassium 4.5 3.5 - 5.5 mmol/L    Chloride 107 98 - 110 mmol/L    CO2 30 20 - 32 mmol/L    AST (SGOT) 13 10 - 37 U/L    ALT (SGPT) 13 5 - 40 U/L    Alk. phosphatase 103 40 - 120 U/L    Bilirubin, total 0.4 0.2 - 1.2 mg/dL    Calcium 9.9 8.4 - 10.5 mg/dL    Protein, total 7.7 6.2 - 8.1 g/dL    Albumin 4.3 3.5 - 5.0 g/dL    A-G Ratio 1.3 1.1 - 2.6 ratio    Globulin 3.4 2.0 - 4.0 g/dL    Anion gap 7.0 3.0 - 15.0 mmol/L    GFRAA >60.0 >60.0    GFRNA 51.7 (L) >60.0       ASSESSMENT and PLAN    ICD-10-CM ICD-9-CM    1. Dyslipidemia  E78.5 272.4    2. Parkinson's disease ([facility] [address]  G20 332.0    3. Mood disorder ([facility])  F39 296.90    4. Essential hypertension  I10 401.9      lab results and schedule of future lab studies reviewed with patient  reviewed diet, exercise and weight control  cardiovascular risk and specific lipid/LDL goals reviewed  reviewed medications and side effects in detail      I have discussed the diagnosis with the patient and the intended plan of care as seen in the above orders. The patient has received an after-visit summary and questions were answered concerning future plans. I have discussed medication, side effects, and warnings with the patient in detail. The patient verbalized understanding and is in agreement with the plan of care. The patient will contact the office with any additional concerns. Davion Mosqueda MD    PLEASE NOTE:   This document has been produced using voice recognition software.  Unrecognized errors in transcription may be present

## 2022-02-08 NOTE — PROGRESS NOTES
Chief Complaint   Patient presents with    Follow Up Chronic Condition     want lab results     1. \"Have you been to the ER, urgent care clinic since your last visit? Hospitalized since your last visit? \" No    2. \"Have you seen or consulted any other health care providers outside of the [address] [place] [address] Drive since your last visit? \" No     3. For patients aged 39-70: Has the patient had a colonoscopy / FIT/ Cologuard? Yes - no Care Gap present      If the patient is female:    4. For patients aged 41-77: Has the patient had a mammogram within the past 2 years? NA - based on age or sex      11. For patients aged 21-65: Has the patient had a pap smear?  NA - based on age or sex

## 2022-03-31 DIAGNOSIS — R44.3 HALLUCINATIONS: ICD-10-CM

## 2022-03-31 DIAGNOSIS — G47.00 INSOMNIA, UNSPECIFIED TYPE: ICD-10-CM

## 2022-03-31 RX ORDER — QUETIAPINE FUMARATE 50 MG/1
50 TABLET, FILM COATED ORAL
Qty: 30 TABLET | Refills: 5 | Status: CANCELLED | OUTPATIENT
Start: 2022-03-31

## 2022-03-31 NOTE — TELEPHONE ENCOUNTER
This pharmacy faxed over request for the following prescriptions to be filled:    Medication requested :   Requested Prescriptions     Pending Prescriptions Disp Refills    QUEtiapine (SEROquel) 50 mg tablet 30 Tablet 5     Sig: Take 1 Tablet by mouth nightly.        PCP: Dr. Ian Calhoun or Print: [id number]  Mail order or Local pharmacy: [facility]

## 2022-03-31 NOTE — TELEPHONE ENCOUNTER
Nellie Bello Requested Prescriptions     Pending Prescriptions Disp Refills    QUEtiapine (SEROquel) 50 mg tablet 30 Tablet 5     Sig: Take 1 Tablet by mouth nightly. Shaila Thacker called for their medication refill. Last Office visit:  02-  Last labs:  01-  Follow up visit:  08-  Last date prescribe 01-    Please Advise   Patient pharmacy faxed over.

## 2022-04-04 RX ORDER — QUETIAPINE FUMARATE 50 MG/1
50 TABLET, FILM COATED ORAL
Qty: 30 TABLET | Refills: 5 | Status: SHIPPED | OUTPATIENT
Start: 2022-04-04 | End: 2022-10-12 | Stop reason: SDUPTHER

## 2022-07-14 DIAGNOSIS — G47.00 INSOMNIA, UNSPECIFIED TYPE: ICD-10-CM

## 2022-07-14 DIAGNOSIS — R44.3 HALLUCINATIONS: ICD-10-CM

## 2022-07-14 RX ORDER — QUETIAPINE FUMARATE 50 MG/1
50 TABLET, FILM COATED ORAL
Qty: 30 TABLET | Refills: 5 | OUTPATIENT
Start: 2022-07-14

## 2022-07-14 NOTE — TELEPHONE ENCOUNTER
This pharmacy faxed over request for the following prescriptions to be filled:    Medication requested :   Requested Prescriptions     Pending Prescriptions Disp Refills    QUEtiapine (SEROquel) 50 mg tablet 30 Tablet 5     Sig: Take 1 Tablet by mouth nightly.        PCP: Dr. Ruby Hogan or Print: [facility]  Mail order or Local pharmacy: [facility]     Scheduled appointment if not seen by current providers in office: 8/8/22

## 2022-07-14 NOTE — TELEPHONE ENCOUNTER
Per the pharmacy they were requesting 90 day supply but patient has 4 refills left if insurance will pay for this- per the pharmacy disregard this request

## 2022-08-08 ENCOUNTER — OFFICE VISIT (OUTPATIENT)
Dept: FAMILY MEDICINE CLINIC | Age: 77
End: 2022-08-08
Payer: MEDICARE

## 2022-08-08 VITALS
DIASTOLIC BLOOD PRESSURE: 61 MMHG | BODY MASS INDEX: 25.44 KG/M2 | WEIGHT: 149 LBS | TEMPERATURE: 98.4 F | HEART RATE: 91 BPM | RESPIRATION RATE: 20 BRPM | HEIGHT: 64 IN | SYSTOLIC BLOOD PRESSURE: 110 MMHG | OXYGEN SATURATION: 100 %

## 2022-08-08 DIAGNOSIS — M81.0 POSTMENOPAUSAL OSTEOPOROSIS: ICD-10-CM

## 2022-08-08 DIAGNOSIS — R63.0 POOR APPETITE: ICD-10-CM

## 2022-08-08 DIAGNOSIS — F39 MOOD DISORDER (HCC): ICD-10-CM

## 2022-08-08 DIAGNOSIS — E78.5 DYSLIPIDEMIA: Primary | ICD-10-CM

## 2022-08-08 DIAGNOSIS — G20 PARKINSON'S DISEASE (HCC): ICD-10-CM

## 2022-08-08 DIAGNOSIS — G47.00 INSOMNIA, UNSPECIFIED TYPE: ICD-10-CM

## 2022-08-08 PROCEDURE — G8510 SCR DEP NEG, NO PLAN REQD: HCPCS | Performed by: FAMILY MEDICINE

## 2022-08-08 PROCEDURE — G8536 NO DOC ELDER MAL SCRN: HCPCS | Performed by: FAMILY MEDICINE

## 2022-08-08 PROCEDURE — 1101F PT FALLS ASSESS-DOCD LE1/YR: CPT | Performed by: FAMILY MEDICINE

## 2022-08-08 PROCEDURE — G8417 CALC BMI ABV UP PARAM F/U: HCPCS | Performed by: FAMILY MEDICINE

## 2022-08-08 PROCEDURE — 99214 OFFICE O/P EST MOD 30 MIN: CPT | Performed by: FAMILY MEDICINE

## 2022-08-08 PROCEDURE — G8752 SYS BP LESS 140: HCPCS | Performed by: FAMILY MEDICINE

## 2022-08-08 PROCEDURE — G8754 DIAS BP LESS 90: HCPCS | Performed by: FAMILY MEDICINE

## 2022-08-08 PROCEDURE — 1123F ACP DISCUSS/DSCN MKR DOCD: CPT | Performed by: FAMILY MEDICINE

## 2022-08-08 PROCEDURE — 1090F PRES/ABSN URINE INCON ASSESS: CPT | Performed by: FAMILY MEDICINE

## 2022-08-08 PROCEDURE — G8427 DOCREV CUR MEDS BY ELIG CLIN: HCPCS | Performed by: FAMILY MEDICINE

## 2022-08-08 RX ORDER — MIRTAZAPINE 15 MG/1
15 TABLET, FILM COATED ORAL
Qty: 90 TABLET | Refills: 1 | Status: SHIPPED | OUTPATIENT
Start: 2022-08-08

## 2022-08-08 RX ORDER — ATORVASTATIN CALCIUM 10 MG/1
10 TABLET, FILM COATED ORAL DAILY
Qty: 90 TABLET | Refills: 1 | Status: SHIPPED | OUTPATIENT
Start: 2022-08-08

## 2022-08-08 NOTE — PROGRESS NOTES
1. \"Have you been to the ER, urgent care clinic since your last visit? Hospitalized since your last visit? \" No    2. \"Have you seen or consulted any other health care providers outside of the [address] [place] [address] since your last visit? \" No     3. For patients aged 39-70: Has the patient had a colonoscopy / FIT/ Cologuard? NA - based on age      If the patient is female:    4. For patients aged 41-77: Has the patient had a mammogram within the past 2 years? NA - based on age or sex      11. For patients aged 21-65: Has the patient had a pap smear?  NA - based on age or sex

## 2022-08-08 NOTE — PROGRESS NOTES
YOANDY  Mary Rich comes in for follow-up care. Dyslipidemia: Patient has dyslipidemia. Patient is on atorvastatin. Stable on the medication. She will take a diet low in polysaturated fats. Continue current treatment plan. Mood disorder: Patient has mood disorder. She is on Seroquel and Remeron. Stable on these medications. Continue current treatment plan. She is followed up with a neurologist.  Poor appetite: Patient has poor appetite. She takes mirtazapine. This has helped with her mood and appetite. Weight has gone up and she currently weighs 149 pounds. Parkinson's disease: Patient has a history of Parkinson's disease. She is seen by the neurologist.  She is on Sinemet. Stable on the medication. Continue current treatment plan. Arthralgia: Patient has generalized arthralgia. She gets joint aches upper and lower extremities. She will take Tylenol arthritis for the pain. Postmenopausal osteoporosis: Patient will be referred for DEXA scan to evaluate for osteoporosis. Past Medical History  Past Medical History:   Diagnosis Date    Hypertension     Parkinson disease [facility])        Surgical History  Past Surgical History:   Procedure Laterality Date    HX CATARACT REMOVAL      HX COLONOSCOPY  2019    HX HYSTERECTOMY  1990    HX SMALL BOWEL RESECTION  2008        Medications  Current Outpatient Medications   Medication Sig Dispense Refill    QUEtiapine (SEROquel) 50 mg tablet Take 1 Tablet by mouth nightly. 30 Tablet 5    mirtazapine (REMERON) 15 mg tablet TAKE 1 TABLET BY MOUTH EVERY DAY AT NIGHT 90 Tablet 1    cholecalciferol (VITAMIN D3) (1000 Units /25 mcg) tablet Take  by mouth daily. atorvastatin (LIPITOR) 10 mg tablet Take 1 Tab by mouth daily. 30 Tab 3    carbidopa-levodopa (SINEMET)  mg per tablet Take 1 Tab by mouth three (3) times daily. melatonin 5 mg cap capsule Take 1 Capsule by mouth nightly.  30 Capsule 5       Allergies  No Known Allergies    Family History  History reviewed. No pertinent family history. Social History  Social History     Socioeconomic History    Marital status:      Spouse name: Not on file    Number of children: Not on file    Years of education: Not on file    Highest education level: Not on file   Occupational History    Not on file   Tobacco Use    Smoking status: Never    Smokeless tobacco: Never   Vaping Use    Vaping Use: Never used   Substance and Sexual Activity    Alcohol use: Never    Drug use: Never    Sexual activity: Not Currently   Other Topics Concern    Not on file   Social History Narrative    Not on file     Social Determinants of Health     Financial Resource Strain: Not on file   Food Insecurity: Not on file   Transportation Needs: Not on file   Physical Activity: Not on file   Stress: Not on file   Social Connections: Not on file   Intimate Partner Violence: Not on file   Housing Stability: Not on file       Review of Systems  Review of Systems - Review of all systems is negative except as noted above in the HPI.     Vital Signs  Visit Vitals  /61 (BP 1 Location: Left upper arm, BP Patient Position: Sitting, BP Cuff Size: Adult)   Pulse 91   Temp 98.4 °F (36.9 °C) (Temporal)   Resp 20   Ht 5' 4\" (1.626 m)   Wt 149 lb (67.6 kg)   SpO2 100%   BMI 25.58 kg/m²         Physical Exam  Physical Examination: General appearance - acyanotic, in no respiratory distress  Mental status - affect appropriate to mood  Neck - supple, no significant adenopathy  Lymphatics - no palpable lymphadenopathy  Chest - decreased air entry noted bilateral lung bases  Heart - S1 and S2 normal  Abdomen - no rebound tenderness noted  Back exam - limited range of motion  Neurological - abnormal neurological exam unchanged from prior examinations  Musculoskeletal - osteoarthritic changes noted in both hands  Extremities - no pedal edema noted, intact peripheral pulses      Results  Results for orders placed or performed in visit on 01/25/22   LIPID PANEL   Result Value Ref Range    Triglyceride 56 40 - 149 mg/dL    HDL Cholesterol 69 >=40 mg/dL    Cholesterol, total 161 110 - 200 mg/dL    CHOLESTEROL/HDL 2.3 0.0 - 5.0    Non-HDL Cholesterol 92 <130 mg/dL    LDL, calculated 81 50 - 99 mg/dL    VLDL, calculated 11 8 - 30 mg/dL    LDL/HDL Ratio 1.2    CBC WITH AUTOMATED DIFF   Result Value Ref Range    WBC 6.9 4.0 - 11.0 K/uL    RBC 4.89 3.80 - 5.20 M/uL    HGB 12.8 11.7 - 16.1 g/dL    HCT 41.7 35.1 - 48.3 %    MCV 85 80 - 99 fL    MCH 26 26 - 34 pg    MCHC 31 31 - 36 g/dL    RDW 14.7 10.0 - 15.5 %    PLATELET 868 857 - 260 K/uL    MPV 11.9 9.0 - 13.0 fL    NEUTROPHILS 55 40 - 75 %    Lymphocytes 34 20 - 45 %    MONOCYTES 7 3 - 12 %    EOSINOPHILS 4 0 - 6 %    BASOPHILS 1 0 - 2 %    ABS. NEUTROPHILS 3.8 1.8 - 7.7 K/uL    ABSOLUTE LYMPHOCYTE COUNT 2.3 1.0 - 4.8 K/uL    ABS. MONOCYTES 0.5 0.1 - 1.0 K/uL    ABS. EOSINOPHILS 0.2 0.0 - 0.5 K/uL    ABS. BASOPHILS 0.0 0.0 - 0.2 K/uL   METABOLIC PANEL, COMPREHENSIVE   Result Value Ref Range    Glucose 97 70 - 99 mg/dL    BUN 17 6 - 22 mg/dL    Creatinine 1.0 0.8 - 1.4 mg/dL    Sodium 144 133 - 145 mmol/L    Potassium 4.5 3.5 - 5.5 mmol/L    Chloride 107 98 - 110 mmol/L    CO2 30 20 - 32 mmol/L    AST (SGOT) 13 10 - 37 U/L    ALT (SGPT) 13 5 - 40 U/L    Alk. phosphatase 103 40 - 120 U/L    Bilirubin, total 0.4 0.2 - 1.2 mg/dL    Calcium 9.9 8.4 - 10.5 mg/dL    Protein, total 7.7 6.2 - 8.1 g/dL    Albumin 4.3 3.5 - 5.0 g/dL    A-G Ratio 1.3 1.1 - 2.6 ratio    Globulin 3.4 2.0 - 4.0 g/dL    Anion gap 7.0 3.0 - 15.0 mmol/L    GFRAA >60.0 >60.0    GFRNA 51.7 (L) >60.0       ASSESSMENT and PLAN    ICD-10-CM ICD-9-CM    1. Dyslipidemia  E78.5 272.4 atorvastatin (LIPITOR) 10 mg tablet      2. Poor appetite  R63.0 783.0 mirtazapine (REMERON) 15 mg tablet      3. Postmenopausal osteoporosis  M81.0 733.01 DEXA BONE DENSITY STUDY AXIAL      4. [facility] ([facility]  G20 332.0       5.  Mood disorder ([facility]  F39 296.90       6. Insomnia, unspecified type  G47.00 780.52         lab results and schedule of future lab studies reviewed with patient  reviewed diet, exercise and weight control  cardiovascular risk and specific lipid/LDL goals reviewed  reviewed medications and side effects in detail      I have discussed the diagnosis with the patient and the intended plan of care as seen in the above orders. The patient has received an after-visit summary and questions were answered concerning future plans. I have discussed medication, side effects, and warnings with the patient in detail. The patient verbalized understanding and is in agreement with the plan of care. The patient will contact the office with any additional concerns. Jeffery Valdovinos MD    PLEASE NOTE:   This document has been produced using voice recognition software.  Unrecognized errors in transcription may be present

## 2022-09-07 ENCOUNTER — HOSPITAL ENCOUNTER (OUTPATIENT)
Dept: BONE DENSITY | Age: 77
Discharge: HOME | End: 2022-09-07
Attending: FAMILY MEDICINE
Payer: MEDICARE

## 2022-09-07 DIAGNOSIS — M81.0 POSTMENOPAUSAL OSTEOPOROSIS: ICD-10-CM

## 2022-09-07 PROCEDURE — 77080 DXA BONE DENSITY AXIAL: CPT

## 2022-09-08 DIAGNOSIS — M81.0 AGE RELATED OSTEOPOROSIS, UNSPECIFIED PATHOLOGICAL FRACTURE PRESENCE: Primary | ICD-10-CM

## 2022-09-08 RX ORDER — MULTIVITAMIN
1 TABLET ORAL DAILY
Status: SHIPPED | OUTPATIENT
Start: 2022-09-09

## 2022-09-08 RX ORDER — ALENDRONATE SODIUM 35 MG/1
35 TABLET ORAL
Qty: 13 TABLET | Refills: 2 | Status: SHIPPED | OUTPATIENT
Start: 2022-09-08

## 2022-09-13 ENCOUNTER — TELEPHONE (OUTPATIENT)
Dept: FAMILY MEDICINE CLINIC | Age: 77
End: 2022-09-13

## 2022-09-13 NOTE — TELEPHONE ENCOUNTER
Pt's daughter is requesting to be contacted to discuss medication for this patient.  Please follow up and contact when available

## 2022-09-13 NOTE — PROGRESS NOTES
Spoke with patient's daughter and advised of test results and recommendations.   She verbalized understanding

## 2022-09-19 DIAGNOSIS — G20 PARKINSON'S DISEASE (HCC): ICD-10-CM

## 2022-09-20 RX ORDER — CARBIDOPA AND LEVODOPA 25; 100 MG/1; MG/1
TABLET ORAL
Qty: 180 TABLET | Refills: 1 | Status: SHIPPED | OUTPATIENT
Start: 2022-09-20 | End: 2022-10-17

## 2022-10-12 ENCOUNTER — OFFICE VISIT (OUTPATIENT)
Dept: FAMILY MEDICINE CLINIC | Age: 77
End: 2022-10-12
Payer: MEDICARE

## 2022-10-12 VITALS
HEIGHT: 64 IN | DIASTOLIC BLOOD PRESSURE: 68 MMHG | TEMPERATURE: 98 F | WEIGHT: 151 LBS | SYSTOLIC BLOOD PRESSURE: 122 MMHG | BODY MASS INDEX: 25.78 KG/M2 | HEART RATE: 93 BPM | RESPIRATION RATE: 20 BRPM | OXYGEN SATURATION: 98 %

## 2022-10-12 DIAGNOSIS — Z13.31 SCREENING FOR DEPRESSION: ICD-10-CM

## 2022-10-12 DIAGNOSIS — G47.00 INSOMNIA, UNSPECIFIED TYPE: ICD-10-CM

## 2022-10-12 DIAGNOSIS — R44.3 HALLUCINATIONS: ICD-10-CM

## 2022-10-12 DIAGNOSIS — Z00.00 MEDICARE ANNUAL WELLNESS VISIT, SUBSEQUENT: Primary | ICD-10-CM

## 2022-10-12 DIAGNOSIS — Z23 ENCOUNTER FOR IMMUNIZATION: ICD-10-CM

## 2022-10-12 DIAGNOSIS — E78.5 DYSLIPIDEMIA: ICD-10-CM

## 2022-10-12 DIAGNOSIS — F39 MOOD DISORDER (HCC): ICD-10-CM

## 2022-10-12 DIAGNOSIS — G20 PARKINSON'S DISEASE (HCC): ICD-10-CM

## 2022-10-12 PROCEDURE — G8417 CALC BMI ABV UP PARAM F/U: HCPCS | Performed by: FAMILY MEDICINE

## 2022-10-12 PROCEDURE — G0008 ADMIN INFLUENZA VIRUS VAC: HCPCS | Performed by: FAMILY MEDICINE

## 2022-10-12 PROCEDURE — G8510 SCR DEP NEG, NO PLAN REQD: HCPCS | Performed by: FAMILY MEDICINE

## 2022-10-12 PROCEDURE — G8427 DOCREV CUR MEDS BY ELIG CLIN: HCPCS | Performed by: FAMILY MEDICINE

## 2022-10-12 PROCEDURE — G0444 DEPRESSION SCREEN ANNUAL: HCPCS | Performed by: FAMILY MEDICINE

## 2022-10-12 PROCEDURE — 90715 TDAP VACCINE 7 YRS/> IM: CPT | Performed by: FAMILY MEDICINE

## 2022-10-12 PROCEDURE — G0439 PPPS, SUBSEQ VISIT: HCPCS | Performed by: FAMILY MEDICINE

## 2022-10-12 PROCEDURE — G8536 NO DOC ELDER MAL SCRN: HCPCS | Performed by: FAMILY MEDICINE

## 2022-10-12 PROCEDURE — G8752 SYS BP LESS 140: HCPCS | Performed by: FAMILY MEDICINE

## 2022-10-12 PROCEDURE — 1101F PT FALLS ASSESS-DOCD LE1/YR: CPT | Performed by: FAMILY MEDICINE

## 2022-10-12 PROCEDURE — 90694 VACC AIIV4 NO PRSRV 0.5ML IM: CPT | Performed by: FAMILY MEDICINE

## 2022-10-12 PROCEDURE — G8399 PT W/DXA RESULTS DOCUMENT: HCPCS | Performed by: FAMILY MEDICINE

## 2022-10-12 PROCEDURE — 1123F ACP DISCUSS/DSCN MKR DOCD: CPT | Performed by: FAMILY MEDICINE

## 2022-10-12 PROCEDURE — G8754 DIAS BP LESS 90: HCPCS | Performed by: FAMILY MEDICINE

## 2022-10-12 RX ORDER — QUETIAPINE FUMARATE 50 MG/1
50 TABLET, FILM COATED ORAL
Qty: 30 TABLET | Refills: 5 | Status: SHIPPED | OUTPATIENT
Start: 2022-10-12

## 2022-10-12 NOTE — PROGRESS NOTES
From: Mar Valdez  To: Antonette Neri  Sent: 2/18/2022 9:35 AM CST  Subject: Hi Dr. Neri, I would like my vitamin D level and Thyroid levels checked. I am experiencing     I'm experiencing fatigue, hair loss and numbness in my extremities.    This is the Subsequent Medicare Annual Wellness Exam, performed 12 months or more after the Initial AWV or the last Subsequent AWV    I have reviewed the patient's medical history in detail and updated the computerized patient record. Assessment/Plan   Education and counseling provided:  Are appropriate based on today's review and evaluation    Depression Risk Factor Screening     3 most recent PHQ Screens 8/8/2022   Little interest or pleasure in doing things Not at all   Feeling down, depressed, irritable, or hopeless Not at all   Total Score PHQ 2 0   Trouble falling or staying asleep, or sleeping too much Not at all   Feeling tired or having little energy Not at all   Poor appetite, weight loss, or overeating Not at all   Feeling bad about yourself - or that you are a failure or have let yourself or your family down Not at all   Trouble concentrating on things such as school, work, reading, or watching TV Not at all   Moving or speaking so slowly that other people could have noticed; or the opposite being so fidgety that others notice Not at all   Thoughts of being better off dead, or hurting yourself in some way Not at all   PHQ 9 Score 0   How difficult have these problems made it for you to do your work, take care of your home and get along with others Not difficult at all   8 minutes taken on depression screening. Alcohol & Drug Abuse Risk Screen    Do you average more than 1 drink per night or more than 7 drinks a week:  No    On any one occasion in the past three months have you have had more than 3 drinks containing alcohol:  No          Functional Ability and Level of Safety    Hearing: Hearing is good. Activities of Daily Living: The home contains: handrails, grab bars, and cane  Patient does total self care      Ambulation: with difficulty, uses a cane     Fall Risk:  Fall Risk Assessment, last 12 mths 8/8/2022   Able to walk? Yes   Fall in past 12 months? 0   Do you feel unsteady?  0 Are you worried about falling 0   Number of falls in past 12 months -   Fall with injury? -      Abuse Screen:  Patient is not abused       Cognitive Screening    Has your family/caregiver stated any concerns about your memory: yes - could be better     Cognitive Screening: Patient has episodes of being forgetful. Health Maintenance Due     Health Maintenance Due   Topic Date Due    DTaP/Tdap/Td series (1 - Tdap) Never done    Shingrix Vaccine Age 50> (1 of 2) Never done    COVID-19 Vaccine (4 - Booster for Moderna series) 06/22/2022    Flu Vaccine (1) 08/01/2022    Medicare Yearly Exam  10/06/2022       Patient Care Team   Patient Care Team:  Edward Miguel MD as PCP - General (Family Medicine)  Edward Miguel MD as PCP - [facility]  Rohit Mcnulty NP (Nurse Practitioner)    History   Jose Ferreira comes in for follow-up care. She is accompanied by her caregiver. Mood disorder: Patient has history of mood disorder. She is on mirtazapine. Also on Seroquel. Stable on the medications. We will continue current treatment plan. Insomnia: Patient has insomnia. She takes Seroquel and melatonin. We will continue current treatment plan. Dyslipidemia: Patient has dyslipidemia. She is on Lipitor. Stable on medication. We will recheck lipid panel at next visit. Parkinson's disease: Patient has a history of Parkinson's disease. She is on Sinemet. Stable on medication. Health maintenance: Patient will get the tetanus and the flu vaccines today.     Patient Active Problem List   Diagnosis Code    H/O colon cancer, stage III Z85.038     Past Medical History:   Diagnosis Date    Hypertension     Parkinson disease ([facility] [address]       Past Surgical History:   Procedure Laterality Date    HX CATARACT REMOVAL      HX COLONOSCOPY  2019    HX HYSTERECTOMY  1990    HX SMALL BOWEL RESECTION  2008     Current Outpatient Medications   Medication Sig Dispense Refill    carbidopa-levodopa (SINEMET)  mg per tablet AS DIRECTED - TAKE 2 TABLETS THREE TIMES A  Tablet 1    alendronate (FOSAMAX) 35 mg tablet Take 1 Tablet by mouth every seven (7) days. 13 Tablet 2    atorvastatin (LIPITOR) 10 mg tablet Take 1 Tablet by mouth in the morning. 90 Tablet 1    mirtazapine (REMERON) 15 mg tablet Take 1 Tablet by mouth nightly. 90 Tablet 1    QUEtiapine (SEROquel) 50 mg tablet Take 1 Tablet by mouth nightly. 30 Tablet 5    melatonin 5 mg cap capsule Take 1 Capsule by mouth nightly. 30 Capsule 5     Current Facility-Administered Medications   Medication Dose Route Frequency Provider Last Rate Last Admin    calcium-cholecalciferol (D3) (CALTRATE 600+D) 1 Tablet  1 Tablet Oral DAILY Telma Gurrola MD         No Known Allergies    No family history on file. Social History     Tobacco Use    Smoking status: Never    Smokeless tobacco: Never   Substance Use Topics    Alcohol use: Never       Review of Systems   Review of all systems is negative except as noted above in the HPI.     Physical Examination   Visit Vitals  /68 (BP 1 Location: Left upper arm, BP Patient Position: Sitting, BP Cuff Size: Adult)   Pulse 93   Temp 98 °F (36.7 °C)   Resp 20   Ht 5' 4\" (1.626 m)   Wt 151 lb (68.5 kg)   SpO2 98%   BMI 25.92 kg/m²     General appearance: alert, cooperative, no distress, appears stated age  Head: Normocephalic, without obvious abnormality, atraumatic  Neck: supple, symmetrical, trachea midline, no adenopathy, thyroid: not enlarged, symmetric, no tenderness/mass/nodules, no carotid bruit, and no JVD  Back: Limited range of motion  Lungs: diminished breath sounds bilateral lung bases  Heart: regular rate and rhythm  Abdomen: No tenderness noted  Extremities: extremities normal, atraumatic, no cyanosis or edema  Pulses: 2+ and symmetric  Skin: Skin color, texture, turgor normal. No rashes or lesions  Lymph nodes: Cervical, supraclavicular, and axillary nodes normal.  Neurologic: Patient with occasional resting tremors Assessment/Plan       ICD-10-CM ICD-9-CM    1. Medicare annual wellness visit, subsequent  Z00.00 V70.0       2. Insomnia, unspecified type  G47.00 780.52 QUEtiapine (SEROquel) 50 mg tablet      3. Hallucinations  R44.3 780.1 QUEtiapine (SEROquel) 50 mg tablet      4. Screening for depression  Z13.31 V79.0 [address]      5. Dyslipidemia  E78.5 272.4       6. Parkinson's disease ([facility] [address]  G20 332.0       7. Mood disorder ([facility])  F39 296.90       8. Encounter for immunization  Z23 V03.89 TDAP, BOOSTRIX, (AGE 10 YRS+), IM      INFLUENZA, FLUAD, (AGE 72 Y+), IM, PF, 0.5 ML      CO IMMUNIZ ADMIN,1 SINGLE/COMB VAC/TOXOID      CO IMMUNIZ,ADMIN,EACH ADDL        reviewed diet, exercise and weight control  cardiovascular risk and specific lipid/LDL goals reviewed  reviewed medications and side effects in detail    I have discussed the diagnosis with the patient and the intended plan of care as seen in the above orders. The patient has received an after-visit summary and questions were answered concerning future plans. I have discussed medication, side effects, and warnings with the patient in detail. The patient verbalized understanding and is in agreement with the plan of care. The patient will contact the office with any additional concerns. Personalized preventative plan of care was discussed, printed and given to patient.     Valerie Pat MD  10/12/2022  11:25 AM

## 2022-10-12 NOTE — PROGRESS NOTES
After obtaining consent, and per orders of Dr. Zuleima English, injection of Td Booster and [address] Adjuvanted were given by [facility]. Patient instructed to remain in clinic for 20 minutes afterwards, and to report any adverse reaction to me immediately.

## 2022-10-12 NOTE — PATIENT INSTRUCTIONS
Medicare Wellness Visit, Female     The best way to live healthy is to have a lifestyle where you eat a well-balanced diet, exercise regularly, limit alcohol use, and quit all forms of tobacco/nicotine, if applicable. Regular preventive services are another way to keep healthy. Preventive services (vaccines, screening tests, monitoring & exams) can help personalize your care plan, which helps you manage your own care. Screening tests can find health problems at the earliest stages, when they are easiest to treat. [facility] follows the current, evidence-based guidelines published by the [facility] ([facility]) when recommending preventive services for our patients. Because we follow these guidelines, sometimes recommendations change over time as research supports it. (For example, mammograms used to be recommended annually. Even though Medicare will still pay for an annual mammogram, the newer guidelines recommend a mammogram every two years for women of average risk). Of course, you and your doctor may decide to screen more often for some diseases, based on your risk and your co-morbidities (chronic disease you are already diagnosed with). Preventive services for you include:  - Medicare offers their members a free annual wellness visit, which is time for you and your primary care provider to discuss and plan for your preventive service needs. Take advantage of this benefit every year!  -All adults over the age of 72 should receive the recommended pneumonia vaccines. Current USPSTF guidelines recommend a series of two vaccines for the best pneumonia protection.   -All adults should have a flu vaccine yearly and a tetanus vaccine every 10 years.   -All adults age 48 and older should receive the shingles vaccines (series of two vaccines).       -All adults age 38-68 who are overweight should have a diabetes screening test once every three years.   -All adults born between 80 and 1965 should be screened once for Hepatitis C.  -Other screening tests and preventive services for persons with diabetes include: an eye exam to screen for diabetic retinopathy, a kidney function test, a foot exam, and stricter control over your cholesterol.   -Cardiovascular screening for adults with routine risk involves an electrocardiogram (ECG) at intervals determined by your doctor.   -Colorectal cancer screenings should be done for adults age 54-65 with no increased risk factors for colorectal cancer. There are a number of acceptable methods of screening for this type of cancer. Each test has its own benefits and drawbacks. Discuss with your doctor what is most appropriate for you during your annual wellness visit. The different tests include: colonoscopy (considered the best screening method), a fecal occult blood test, a fecal DNA test, and sigmoidoscopy.    -A bone mass density test is recommended when a woman turns 65 to screen for osteoporosis. This test is only recommended one time, as a screening. Some providers will use this same test as a disease monitoring tool if you already have osteoporosis. -Breast cancer screenings are recommended every other year for women of normal risk, age 54-69.  -Cervical cancer screenings for women over age 72 are only recommended with certain risk factors. Here is a list of your current Health Maintenance items (your personalized list of preventive services) with a due date:  Health Maintenance Due   Topic Date Due    Shingles Vaccine (1 of 2) Never done    COVID-19 Vaccine (4 - Booster for Waneta Dunnings series) 06/22/2022         Medicare Wellness Visit, Female     The best way to live healthy is to have a lifestyle where you eat a well-balanced diet, exercise regularly, limit alcohol use, and quit all forms of tobacco/nicotine, if applicable. Regular preventive services are another way to keep healthy.  Preventive services (vaccines, screening tests, monitoring & exams) can help personalize your care plan, which helps you manage your own care. Screening tests can find health problems at the earliest stages, when they are easiest to treat. [facility] follows the current, evidence-based guidelines published by the [facility] ([facility]) when recommending preventive services for our patients. Because we follow these guidelines, sometimes recommendations change over time as research supports it. (For example, mammograms used to be recommended annually. Even though Medicare will still pay for an annual mammogram, the newer guidelines recommend a mammogram every two years for women of average risk). Of course, you and your doctor may decide to screen more often for some diseases, based on your risk and your co-morbidities (chronic disease you are already diagnosed with). Preventive services for you include:  - Medicare offers their members a free annual wellness visit, which is time for you and your primary care provider to discuss and plan for your preventive service needs. Take advantage of this benefit every year!  -All adults over the age of 72 should receive the recommended pneumonia vaccines. Current USPSTF guidelines recommend a series of two vaccines for the best pneumonia protection.   -All adults should have a flu vaccine yearly and a tetanus vaccine every 10 years.   -All adults age 48 and older should receive the shingles vaccines (series of two vaccines).       -All adults age 38-68 who are overweight should have a diabetes screening test once every three years.   -All adults born between 80 and 1965 should be screened once for Hepatitis C.  -Other screening tests and preventive services for persons with diabetes include: an eye exam to screen for diabetic retinopathy, a kidney function test, a foot exam, and stricter control over your cholesterol.   -Cardiovascular screening for adults with routine risk involves an electrocardiogram (ECG) at intervals determined by your doctor.   -Colorectal cancer screenings should be done for adults age 54-65 with no increased risk factors for colorectal cancer. There are a number of acceptable methods of screening for this type of cancer. Each test has its own benefits and drawbacks. Discuss with your doctor what is most appropriate for you during your annual wellness visit. The different tests include: colonoscopy (considered the best screening method), a fecal occult blood test, a fecal DNA test, and sigmoidoscopy.    -A bone mass density test is recommended when a woman turns 65 to screen for osteoporosis. This test is only recommended one time, as a screening. Some providers will use this same test as a disease monitoring tool if you already have osteoporosis. -Breast cancer screenings are recommended every other year for women of normal risk, age 54-69.  -Cervical cancer screenings for women over age 72 are only recommended with certain risk factors.      Here is a list of your current Health Maintenance items (your personalized list of preventive services) with a due date:  Health Maintenance Due   Topic Date Due    Shingles Vaccine (1 of 2) Never done    COVID-19 Vaccine (4 - Booster for Rianna Yolis series) 06/22/2022

## 2022-10-15 DIAGNOSIS — G20 PARKINSON'S DISEASE (HCC): ICD-10-CM

## 2022-10-17 RX ORDER — CARBIDOPA AND LEVODOPA 25; 100 MG/1; MG/1
TABLET ORAL
Qty: 180 TABLET | Refills: 1 | Status: SHIPPED | OUTPATIENT
Start: 2022-10-17

## 2022-11-09 DIAGNOSIS — G20 PARKINSON'S DISEASE (HCC): ICD-10-CM

## 2022-11-09 RX ORDER — CARBIDOPA AND LEVODOPA 25; 100 MG/1; MG/1
TABLET ORAL
Qty: 180 TABLET | Refills: 1 | Status: SHIPPED | OUTPATIENT
Start: 2022-11-09

## 2022-11-16 ENCOUNTER — VIRTUAL VISIT (OUTPATIENT)
Dept: FAMILY MEDICINE CLINIC | Age: 77
End: 2022-11-16
Payer: MEDICARE

## 2022-11-16 DIAGNOSIS — G20 PARKINSON'S DISEASE (HCC): ICD-10-CM

## 2022-11-16 DIAGNOSIS — G47.00 INSOMNIA, UNSPECIFIED TYPE: ICD-10-CM

## 2022-11-16 DIAGNOSIS — J06.9 UPPER RESPIRATORY TRACT INFECTION, UNSPECIFIED TYPE: Primary | ICD-10-CM

## 2022-11-16 DIAGNOSIS — E78.5 DYSLIPIDEMIA: ICD-10-CM

## 2022-11-16 DIAGNOSIS — R44.3 HALLUCINATIONS: ICD-10-CM

## 2022-11-16 PROCEDURE — 1090F PRES/ABSN URINE INCON ASSESS: CPT | Performed by: FAMILY MEDICINE

## 2022-11-16 PROCEDURE — G8510 SCR DEP NEG, NO PLAN REQD: HCPCS | Performed by: FAMILY MEDICINE

## 2022-11-16 PROCEDURE — G8427 DOCREV CUR MEDS BY ELIG CLIN: HCPCS | Performed by: FAMILY MEDICINE

## 2022-11-16 PROCEDURE — 1101F PT FALLS ASSESS-DOCD LE1/YR: CPT | Performed by: FAMILY MEDICINE

## 2022-11-16 PROCEDURE — G8756 NO BP MEASURE DOC: HCPCS | Performed by: FAMILY MEDICINE

## 2022-11-16 PROCEDURE — 99213 OFFICE O/P EST LOW 20 MIN: CPT | Performed by: FAMILY MEDICINE

## 2022-11-16 PROCEDURE — 1123F ACP DISCUSS/DSCN MKR DOCD: CPT | Performed by: FAMILY MEDICINE

## 2022-11-16 PROCEDURE — G8399 PT W/DXA RESULTS DOCUMENT: HCPCS | Performed by: FAMILY MEDICINE

## 2022-11-16 NOTE — PROGRESS NOTES
1. \"Have you been to the ER, urgent care clinic since your last visit? Hospitalized since your last visit? \" No    2. \"Have you seen or consulted any other health care providers outside of the [address] [place] [address] since your last visit? \" No     3. For patients aged 39-70: Has the patient had a colonoscopy / FIT/ Cologuard? NA - based on age      If the patient is female:    4. For patients aged 41-77: Has the patient had a mammogram within the past 2 years? NA - based on age or sex      11. For patients aged 21-65: Has the patient had a pap smear?  NA - based on age or sex

## 2022-11-16 NOTE — PROGRESS NOTES
Lisseth Sterling is a 69 y.o. female who was seen by synchronous (real-time) audio-video technology on 11/16/2022 for Cold Symptoms (Night sweats unknown fever, productive cough)        Assessment & Plan:       ICD-10-CM ICD-9-CM    1. Upper respiratory tract infection, unspecified type  J06.9 465.9       2. Parkinson's disease ([facility]  G20 332.0       3. Insomnia, unspecified type  G47.00 780.52       4. Hallucinations  R44.3 780.1       5. Dyslipidemia  E78.5 272.4         Subjective:   Lisseth Sterling had this visit for acute care. URI: Patient had nasal congestion and runny nose yesterday. Today she feels improved. Denies sneezing, fever, chills, cough, wheeze or shortness of breath. This is likely upper respiratory infection. She states that he does not usually have allergies. We discussed supportive care measures including using Claritin for nasal congestion and sneezing. She can take Tylenol for pain or fever. If symptoms recur she will call and let us know. Parkinson's disease: Patient has Sinemet for Parkinson's disease. She will continue with the medication. Behavioral health: Patient has a history of mood disorder and hallucinations. She is on Seroquel and mirtazapine. She will continue with these medications. Dyslipidemia: Patient has dyslipidemia. She is on atorvastatin. Stable on medication. Continue current treatment plan      Prior to Admission medications    Medication Sig Start Date End Date Taking? Authorizing Provider   carbidopa-levodopa (SINEMET)  mg per tablet TAKE 2 TABLETS BY MOUTH 3 TIMES A DAY 11/9/22  Yes Telma Gurrola MD   QUEtiapine (SEROquel) 50 mg tablet Take 1 Tablet by mouth nightly. 10/12/22  Yes Telma Gurrola MD   alendronate (FOSAMAX) 35 mg tablet Take 1 Tablet by mouth every seven (7) days. 9/8/22  Yes Telma Gurrola MD   atorvastatin (LIPITOR) 10 mg tablet Take 1 Tablet by mouth in the morning.  8/8/22  Yes Jerrica Scott MD   mirtazapine (REMERON) 15 mg tablet Take 1 Tablet by mouth nightly. 8/8/22  Yes Telma Gurrola MD   melatonin 5 mg cap capsule Take 1 Capsule by mouth nightly. 1/11/22  Yes MD OSCAR Hart Review of all systems is negative except as noted above in the HPI. Objective:     Patient-Reported Vitals 11/16/2022   Patient-Reported Weight 151   Constitutional: [x]  No apparent distress      Mental status: [x] Alert and awake  [x] Oriented to person/place/time [x] Able to follow commands     HENT: [x] Normocephalic, atraumatic     Pulmonary/Chest: [x] Respiratory effort normal   [x] No visualized signs of difficulty breathing or respiratory distress            Neurological:        [x] No Facial Asymmetry (Cranial nerve 7 motor function) (limited exam due to video visit)                      Psychiatric:       [x] No Hallucinations      We discussed the expected course, resolution and complications of the diagnosis(es) in detail. Medication risks, benefits, costs, interactions, and alternatives were discussed as indicated. I advised her to contact the office if her condition worsens, changes or fails to improve as anticipated. She expressed understanding with the diagnosis(es) and plan. Lynda Stovall, was evaluated through a synchronous (real-time) audio-video encounter. The patient (or guardian if applicable) is aware that this is a billable service, which includes applicable co-pays. This Virtual Visit was conducted with patient's (and/or legal guardian's) consent. The visit was conducted pursuant to the emergency declaration under the [address] [place] [address] authority and the [employer] General Act. Patient identification was verified, and a caregiver was present when appropriate. The patient was located at: Home: [facility] [id number]  The provider was located at:  Facility (Appt Department): [address] [place] [address] WAY  [id number] [facility]        Ana Maria Trammell MD

## 2022-12-14 DIAGNOSIS — G20 PARKINSON'S DISEASE (HCC): ICD-10-CM

## 2022-12-14 RX ORDER — CARBIDOPA AND LEVODOPA 25; 100 MG/1; MG/1
TABLET ORAL
Qty: 180 TABLET | Refills: 1 | Status: SHIPPED | OUTPATIENT
Start: 2022-12-14

## 2023-01-12 DIAGNOSIS — G20 PARKINSON'S DISEASE (HCC): ICD-10-CM

## 2023-01-12 RX ORDER — CARBIDOPA AND LEVODOPA 25; 100 MG/1; MG/1
TABLET ORAL
Qty: 180 TABLET | Refills: 1 | Status: SHIPPED | OUTPATIENT
Start: 2023-01-12

## 2023-02-01 DIAGNOSIS — E78.5 DYSLIPIDEMIA: ICD-10-CM

## 2023-02-01 DIAGNOSIS — R63.0 POOR APPETITE: ICD-10-CM

## 2023-02-01 RX ORDER — ATORVASTATIN CALCIUM 10 MG/1
TABLET, FILM COATED ORAL
Qty: 90 TABLET | Refills: 1 | Status: SHIPPED | OUTPATIENT
Start: 2023-02-01

## 2023-02-01 RX ORDER — MIRTAZAPINE 15 MG/1
15 TABLET, FILM COATED ORAL
Qty: 90 TABLET | Refills: 1 | Status: SHIPPED | OUTPATIENT
Start: 2023-02-01

## 2023-02-06 ENCOUNTER — OFFICE VISIT (OUTPATIENT)
Dept: FAMILY MEDICINE CLINIC | Age: 78
End: 2023-02-06
Payer: MEDICARE

## 2023-02-06 VITALS
HEART RATE: 81 BPM | BODY MASS INDEX: 24.82 KG/M2 | TEMPERATURE: 98.1 F | SYSTOLIC BLOOD PRESSURE: 137 MMHG | OXYGEN SATURATION: 99 % | DIASTOLIC BLOOD PRESSURE: 71 MMHG | RESPIRATION RATE: 20 BRPM | WEIGHT: 145.4 LBS | HEIGHT: 64 IN

## 2023-02-06 DIAGNOSIS — I10 ESSENTIAL HYPERTENSION: ICD-10-CM

## 2023-02-06 DIAGNOSIS — F39 MOOD DISORDER (HCC): ICD-10-CM

## 2023-02-06 DIAGNOSIS — G20 PARKINSON'S DISEASE (HCC): Primary | ICD-10-CM

## 2023-02-06 DIAGNOSIS — E78.5 DYSLIPIDEMIA: ICD-10-CM

## 2023-02-06 PROBLEM — G20.A1 PARKINSON'S DISEASE: Status: ACTIVE | Noted: 2023-02-06

## 2023-02-06 PROCEDURE — 1123F ACP DISCUSS/DSCN MKR DOCD: CPT | Performed by: FAMILY MEDICINE

## 2023-02-06 PROCEDURE — G8420 CALC BMI NORM PARAMETERS: HCPCS | Performed by: FAMILY MEDICINE

## 2023-02-06 PROCEDURE — 3074F SYST BP LT 130 MM HG: CPT | Performed by: FAMILY MEDICINE

## 2023-02-06 PROCEDURE — 3078F DIAST BP <80 MM HG: CPT | Performed by: FAMILY MEDICINE

## 2023-02-06 PROCEDURE — G8427 DOCREV CUR MEDS BY ELIG CLIN: HCPCS | Performed by: FAMILY MEDICINE

## 2023-02-06 PROCEDURE — G8536 NO DOC ELDER MAL SCRN: HCPCS | Performed by: FAMILY MEDICINE

## 2023-02-06 PROCEDURE — 1090F PRES/ABSN URINE INCON ASSESS: CPT | Performed by: FAMILY MEDICINE

## 2023-02-06 PROCEDURE — 1101F PT FALLS ASSESS-DOCD LE1/YR: CPT | Performed by: FAMILY MEDICINE

## 2023-02-06 PROCEDURE — G8510 SCR DEP NEG, NO PLAN REQD: HCPCS | Performed by: FAMILY MEDICINE

## 2023-02-06 PROCEDURE — 99214 OFFICE O/P EST MOD 30 MIN: CPT | Performed by: FAMILY MEDICINE

## 2023-02-06 PROCEDURE — G8399 PT W/DXA RESULTS DOCUMENT: HCPCS | Performed by: FAMILY MEDICINE

## 2023-02-06 NOTE — PROGRESS NOTES
[facility]  Mary Carvajal comes in for follow-up. Parkinson's disease: Parental history of Parkinson's disease. Patient is on Sinemet. Stable on medication. We will continue current treatment plan. Dyslipidemia: Patient has dyslipidemia. Has been prescribed Lipitor but takes it sporadically. We will recheck lipid panel. She will continue to take a dacryon body saturated fats. Osteoporosis: Patient has osteoporosis. She takes Fosamax weekly. Continue current treatment plan. Hypertension: Patient with a history of hypertension. Currently stable. Not on medication. She will take a low-sodium diet. We will recheck labs at next visit. Mood disorder: Patient has history of mood disorder. She was on Seroquel and Remeron. Currently only taking Remeron. Continue current treatment plan. Insomnia: Patient has insomnia. Patient is on Remeron. Stable on medication. Continue current treatment plan. Past Medical History  Past Medical History:   Diagnosis Date    Hypertension     Parkinson disease ([facility] [address]        Surgical History  Past Surgical History:   Procedure Laterality Date    HX CATARACT REMOVAL      HX COLONOSCOPY  2019    HX HYSTERECTOMY  1990    HX SMALL BOWEL RESECTION  2008        Medications  Current Outpatient Medications   Medication Sig Dispense Refill    mirtazapine (REMERON) 15 mg tablet TAKE 1 TABLET BY MOUTH NIGHTLY 90 Tablet 1    atorvastatin (LIPITOR) 10 mg tablet TAKE 1 TABLET BY MOUTH EVERY DAY IN THE MORNING 90 Tablet 1    carbidopa-levodopa (SINEMET)  mg per tablet TAKE 2 TABLETS BY MOUTH 3 TIMES A  Tablet 1    alendronate (FOSAMAX) 35 mg tablet Take 1 Tablet by mouth every seven (7) days.  13 Tablet 2     Current Facility-Administered Medications   Medication Dose Route Frequency Provider Last Rate Last Admin    calcium-cholecalciferol (D3) (CALTRATE 600+D) 1 Tablet  1 Tablet Oral DAILY Telma Gurrola MD           Allergies  No Known Allergies    Family History  History reviewed. No pertinent family history. Social History  Social History     Socioeconomic History    Marital status:      Spouse name: Not on file    Number of children: Not on file    Years of education: Not on file    Highest education level: Not on file   Occupational History    Not on file   Tobacco Use    Smoking status: Never    Smokeless tobacco: Never   Vaping Use    Vaping Use: Never used   Substance and Sexual Activity    Alcohol use: Never    Drug use: Never    Sexual activity: Not Currently   Other Topics Concern    Not on file   Social History Narrative    Not on file     Social Determinants of Health     Financial Resource Strain: Not on file   Food Insecurity: Not on file   Transportation Needs: Not on file   Physical Activity: Not on file   Stress: Not on file   Social Connections: Not on file   Intimate Partner Violence: Not on file   Housing Stability: Not on file       Review of Systems  Review of Systems - Review of all systems is negative except as noted above in the HPI.     Vital Signs  Visit Vitals  /71   Pulse 81   Temp 98.1 °F (36.7 °C)   Resp 20   Ht 5' 4\" (1.626 m)   Wt 145 lb 6.4 oz (66 kg)   SpO2 99%   BMI 24.96 kg/m²         Physical Exam  Physical Examination: General appearance -  acyanotic, in no respiratory distress  Mental status - affect appropriate to mood  Chest - no tachypnea, retractions or cyanosis  Heart - S1 and S2 normal  Abdomen - no rebound tenderness noted  Back exam - limited range of motion  Neurological - abnormal neurological exam unchanged from prior examinations  Musculoskeletal - osteoarthritic changes noted in both hands  Extremities - intact peripheral pulses      Results  Results for orders placed or performed in visit on 01/25/22   LIPID PANEL   Result Value Ref Range    Triglyceride 56 40 - 149 mg/dL    HDL Cholesterol 69 >=40 mg/dL    Cholesterol, total 161 110 - 200 mg/dL    CHOLESTEROL/HDL 2.3 0.0 - 5.0    Non-HDL Cholesterol 92 <130 mg/dL    LDL, calculated 81 50 - 99 mg/dL    VLDL, calculated 11 8 - 30 mg/dL    LDL/HDL Ratio 1.2    CBC WITH AUTOMATED DIFF   Result Value Ref Range    WBC 6.9 4.0 - 11.0 K/uL    RBC 4.89 3.80 - 5.20 M/uL    HGB 12.8 11.7 - 16.1 g/dL    HCT 41.7 35.1 - 48.3 %    MCV 85 80 - 99 fL    MCH 26 26 - 34 pg    MCHC 31 31 - 36 g/dL    RDW 14.7 10.0 - 15.5 %    PLATELET 606 923 - 931 K/uL    MPV 11.9 9.0 - 13.0 fL    NEUTROPHILS 55 40 - 75 %    Lymphocytes 34 20 - 45 %    MONOCYTES 7 3 - 12 %    EOSINOPHILS 4 0 - 6 %    BASOPHILS 1 0 - 2 %    ABS. NEUTROPHILS 3.8 1.8 - 7.7 K/uL    ABSOLUTE LYMPHOCYTE COUNT 2.3 1.0 - 4.8 K/uL    ABS. MONOCYTES 0.5 0.1 - 1.0 K/uL    ABS. EOSINOPHILS 0.2 0.0 - 0.5 K/uL    ABS. BASOPHILS 0.0 0.0 - 0.2 K/uL   METABOLIC PANEL, COMPREHENSIVE   Result Value Ref Range    Glucose 97 70 - 99 mg/dL    BUN 17 6 - 22 mg/dL    Creatinine 1.0 0.8 - 1.4 mg/dL    Sodium 144 133 - 145 mmol/L    Potassium 4.5 3.5 - 5.5 mmol/L    Chloride 107 98 - 110 mmol/L    CO2 30 20 - 32 mmol/L    AST (SGOT) 13 10 - 37 U/L    ALT (SGPT) 13 5 - 40 U/L    Alk. phosphatase 103 40 - 120 U/L    Bilirubin, total 0.4 0.2 - 1.2 mg/dL    Calcium 9.9 8.4 - 10.5 mg/dL    Protein, total 7.7 6.2 - 8.1 g/dL    Albumin 4.3 3.5 - 5.0 g/dL    A-G Ratio 1.3 1.1 - 2.6 ratio    Globulin 3.4 2.0 - 4.0 g/dL    Anion gap 7.0 3.0 - 15.0 mmol/L    GFRAA >60.0 >60.0    GFRNA 51.7 (L) >60.0       ASSESSMENT and PLAN    ICD-10-CM ICD-9-CM    1. Parkinson's disease ([facility] [address]  G20 332.0       2. Mood disorder (HCC)  F39 296.90       3.  Dyslipidemia  E78.5 272.4 LIPID PANEL      4. Essential hypertension  M81 142.7 METABOLIC PANEL, COMPREHENSIVE      CBC WITH AUTOMATED DIFF        lab results and schedule of future lab studies reviewed with patient  reviewed diet, exercise and weight control  cardiovascular risk and specific lipid/LDL goals reviewed  reviewed medications and side effects in detail      I have discussed the diagnosis with the patient and the intended plan of care as seen in the above orders. The patient has received an after-visit summary and questions were answered concerning future plans. I have discussed medication, side effects, and warnings with the patient in detail. The patient verbalized understanding and is in agreement with the plan of care. The patient will contact the office with any additional concerns. Elyse Santizo MD    PLEASE NOTE:   This document has been produced using voice recognition software.  Unrecognized errors in transcription may be present

## 2023-02-06 NOTE — PROGRESS NOTES
1. \"Have you been to the ER, urgent care clinic since your last visit? Hospitalized since your last visit? \" No    2. \"Have you seen or consulted any other health care providers outside of the [address] [place] [address] since your last visit? \" No     3. For patients aged 39-70: Has the patient had a colonoscopy / FIT/ Cologuard? NA - based on age      If the patient is female:    4. For patients aged 41-77: Has the patient had a mammogram within the past 2 years? NA - based on age or sex      11. For patients aged 21-65: Has the patient had a pap smear?  NA - based on age or sex

## 2023-02-10 DIAGNOSIS — G20 PARKINSON'S DISEASE (HCC): ICD-10-CM

## 2023-03-27 RX ORDER — QUETIAPINE FUMARATE 50 MG/1
50 TABLET, FILM COATED ORAL NIGHTLY
Qty: 30 TABLET | Refills: 2 | Status: SHIPPED | OUTPATIENT
Start: 2023-03-27

## 2023-05-11 RX ORDER — CARBIDOPA AND LEVODOPA 25; 100 MG/1; MG/1
TABLET ORAL
Qty: 180 TABLET | Refills: 1 | OUTPATIENT
Start: 2023-05-11

## 2023-06-05 DIAGNOSIS — M81.0 AGE-RELATED OSTEOPOROSIS WITHOUT CURRENT PATHOLOGICAL FRACTURE: ICD-10-CM

## 2023-06-05 RX ORDER — ALENDRONATE SODIUM 35 MG/1
TABLET ORAL
Qty: 12 TABLET | Refills: 2 | Status: SHIPPED | OUTPATIENT
Start: 2023-06-05 | End: 2023-06-06 | Stop reason: SDUPTHER

## 2023-06-06 ENCOUNTER — OFFICE VISIT (OUTPATIENT)
Facility: CLINIC | Age: 78
End: 2023-06-06
Payer: MEDICARE

## 2023-06-06 VITALS
HEIGHT: 65 IN | DIASTOLIC BLOOD PRESSURE: 61 MMHG | WEIGHT: 145 LBS | RESPIRATION RATE: 18 BRPM | BODY MASS INDEX: 24.16 KG/M2 | TEMPERATURE: 97.8 F | SYSTOLIC BLOOD PRESSURE: 111 MMHG | OXYGEN SATURATION: 98 % | HEART RATE: 97 BPM

## 2023-06-06 DIAGNOSIS — M81.0 AGE-RELATED OSTEOPOROSIS WITHOUT CURRENT PATHOLOGICAL FRACTURE: ICD-10-CM

## 2023-06-06 DIAGNOSIS — F39 UNSPECIFIED MOOD (AFFECTIVE) DISORDER (HCC): ICD-10-CM

## 2023-06-06 DIAGNOSIS — G20 PARKINSON'S DISEASE (HCC): Primary | ICD-10-CM

## 2023-06-06 DIAGNOSIS — E78.5 HYPERLIPIDEMIA, UNSPECIFIED HYPERLIPIDEMIA TYPE: ICD-10-CM

## 2023-06-06 PROCEDURE — 1036F TOBACCO NON-USER: CPT | Performed by: FAMILY MEDICINE

## 2023-06-06 PROCEDURE — G8427 DOCREV CUR MEDS BY ELIG CLIN: HCPCS | Performed by: FAMILY MEDICINE

## 2023-06-06 PROCEDURE — 99213 OFFICE O/P EST LOW 20 MIN: CPT | Performed by: FAMILY MEDICINE

## 2023-06-06 PROCEDURE — 1123F ACP DISCUSS/DSCN MKR DOCD: CPT | Performed by: FAMILY MEDICINE

## 2023-06-06 PROCEDURE — G8399 PT W/DXA RESULTS DOCUMENT: HCPCS | Performed by: FAMILY MEDICINE

## 2023-06-06 PROCEDURE — 1090F PRES/ABSN URINE INCON ASSESS: CPT | Performed by: FAMILY MEDICINE

## 2023-06-06 PROCEDURE — G8420 CALC BMI NORM PARAMETERS: HCPCS | Performed by: FAMILY MEDICINE

## 2023-06-06 RX ORDER — ALENDRONATE SODIUM 35 MG/1
TABLET ORAL
Qty: 12 TABLET | Refills: 2 | Status: SHIPPED | OUTPATIENT
Start: 2023-06-06

## 2023-06-06 SDOH — ECONOMIC STABILITY: HOUSING INSECURITY
IN THE LAST 12 MONTHS, WAS THERE A TIME WHEN YOU DID NOT HAVE A STEADY PLACE TO SLEEP OR SLEPT IN A SHELTER (INCLUDING NOW)?: NO

## 2023-06-06 SDOH — ECONOMIC STABILITY: FOOD INSECURITY: WITHIN THE PAST 12 MONTHS, YOU WORRIED THAT YOUR FOOD WOULD RUN OUT BEFORE YOU GOT MONEY TO BUY MORE.: NEVER TRUE

## 2023-06-06 SDOH — ECONOMIC STABILITY: FOOD INSECURITY: WITHIN THE PAST 12 MONTHS, THE FOOD YOU BOUGHT JUST DIDN'T LAST AND YOU DIDN'T HAVE MONEY TO GET MORE.: NEVER TRUE

## 2023-06-06 SDOH — ECONOMIC STABILITY: INCOME INSECURITY: HOW HARD IS IT FOR YOU TO PAY FOR THE VERY BASICS LIKE FOOD, HOUSING, MEDICAL CARE, AND HEATING?: SOMEWHAT HARD

## 2023-06-06 ASSESSMENT — PATIENT HEALTH QUESTIONNAIRE - PHQ9
SUM OF ALL RESPONSES TO PHQ QUESTIONS 1-9: 0
SUM OF ALL RESPONSES TO PHQ9 QUESTIONS 1 & 2: 0
SUM OF ALL RESPONSES TO PHQ QUESTIONS 1-9: 0
SUM OF ALL RESPONSES TO PHQ QUESTIONS 1-9: 0
1. LITTLE INTEREST OR PLEASURE IN DOING THINGS: 0
2. FEELING DOWN, DEPRESSED OR HOPELESS: 0
SUM OF ALL RESPONSES TO PHQ QUESTIONS 1-9: 0

## 2023-06-06 NOTE — PROGRESS NOTES
1. \"Have you been to the ER, urgent care clinic since your last visit? Hospitalized since your last visit? \"No    2. \"Have you seen or consulted any other health care providers outside of the [address] [place] [address] since your last visit? \" No    3. For patients aged 39-70: Has the patient had a colonoscopy / FIT/ Cologuard? N/A      If the patient is female:    4. For patients aged 41-77: Has the patient had a mammogram within the past 2 years? Not applicable      5. For patients aged 21-65: Has the patient had a pap smear?  Not applicable
type  E78.5       4. Age-related osteoporosis without current pathological fracture  M81.0 alendronate (FOSAMAX) 35 MG tablet      lab results and schedule of future lab studies reviewed with patient  reviewed diet, exercise and weight control  cardiovascular risk and specific lipid/LDL goals reviewed  reviewed medications and side effects in detail      I have discussed the diagnosis with the patient and the intended plan of care as seen in the above orders. The patient has received an after-visit summary and questions were answered concerning future plans. I have discussed medication, side effects, and warnings with the patient in detail. The patient verbalized understanding and is in agreement with the plan of care. The patient will contact the office with any additional concerns. Maico Nazario MD    PLEASE NOTE:   This document has been produced using voice recognition software.  Unrecognized errors in transcription may be present

## 2023-07-18 ENCOUNTER — TELEPHONE (OUTPATIENT)
Facility: CLINIC | Age: 78
End: 2023-07-18

## 2023-07-18 NOTE — TELEPHONE ENCOUNTER
Elvis Raza the daughter of the patient wants information on how to get this patient a wheelchair.  Please follow up to advise when available

## 2023-07-24 ENCOUNTER — CLINICAL DOCUMENTATION (OUTPATIENT)
Facility: CLINIC | Age: 78
End: 2023-07-24

## 2023-08-03 DIAGNOSIS — E78.5 HYPERLIPIDEMIA, UNSPECIFIED: ICD-10-CM

## 2023-08-03 DIAGNOSIS — R63.0 ANOREXIA: ICD-10-CM

## 2023-08-07 RX ORDER — ATORVASTATIN CALCIUM 10 MG/1
TABLET, FILM COATED ORAL
Qty: 90 TABLET | Refills: 1 | Status: SHIPPED | OUTPATIENT
Start: 2023-08-07

## 2023-08-07 RX ORDER — MIRTAZAPINE 15 MG/1
TABLET, FILM COATED ORAL
Qty: 90 TABLET | Refills: 1 | Status: SHIPPED | OUTPATIENT
Start: 2023-08-07

## 2023-08-28 RX ORDER — QUETIAPINE FUMARATE 50 MG/1
50 TABLET, FILM COATED ORAL NIGHTLY
Qty: 30 TABLET | Refills: 2 | Status: SHIPPED | OUTPATIENT
Start: 2023-08-28

## 2023-10-12 ENCOUNTER — OFFICE VISIT (OUTPATIENT)
Facility: CLINIC | Age: 78
End: 2023-10-12

## 2023-10-12 VITALS
OXYGEN SATURATION: 99 % | HEIGHT: 65 IN | BODY MASS INDEX: 23.99 KG/M2 | WEIGHT: 144 LBS | TEMPERATURE: 97.8 F | HEART RATE: 68 BPM | RESPIRATION RATE: 20 BRPM

## 2023-10-12 DIAGNOSIS — R26.89 POOR BALANCE: ICD-10-CM

## 2023-10-12 DIAGNOSIS — E07.9 THYROID DISORDER: ICD-10-CM

## 2023-10-12 DIAGNOSIS — R53.1 WEAKNESS: ICD-10-CM

## 2023-10-12 DIAGNOSIS — Z00.00 MEDICARE ANNUAL WELLNESS VISIT, SUBSEQUENT: Primary | ICD-10-CM

## 2023-10-12 DIAGNOSIS — I10 ESSENTIAL (PRIMARY) HYPERTENSION: ICD-10-CM

## 2023-10-12 DIAGNOSIS — M25.551 PAIN OF RIGHT HIP: ICD-10-CM

## 2023-10-12 DIAGNOSIS — M25.571 CHRONIC PAIN OF RIGHT ANKLE: ICD-10-CM

## 2023-10-12 DIAGNOSIS — E78.5 HYPERLIPIDEMIA, UNSPECIFIED HYPERLIPIDEMIA TYPE: ICD-10-CM

## 2023-10-12 DIAGNOSIS — R73.9 HYPERGLYCEMIA: ICD-10-CM

## 2023-10-12 DIAGNOSIS — G20.A1 PARKINSON'S DISEASE, UNSPECIFIED WHETHER DYSKINESIA PRESENT, UNSPECIFIED WHETHER MANIFESTATIONS FLUCTUATE: ICD-10-CM

## 2023-10-12 DIAGNOSIS — G89.29 CHRONIC PAIN OF RIGHT ANKLE: ICD-10-CM

## 2023-10-12 DIAGNOSIS — Z23 ENCOUNTER FOR IMMUNIZATION: ICD-10-CM

## 2023-10-12 DIAGNOSIS — I73.9 CLAUDICATION (HCC): ICD-10-CM

## 2023-10-12 SDOH — ECONOMIC STABILITY: INCOME INSECURITY: HOW HARD IS IT FOR YOU TO PAY FOR THE VERY BASICS LIKE FOOD, HOUSING, MEDICAL CARE, AND HEATING?: NOT VERY HARD

## 2023-10-12 SDOH — ECONOMIC STABILITY: FOOD INSECURITY: WITHIN THE PAST 12 MONTHS, THE FOOD YOU BOUGHT JUST DIDN'T LAST AND YOU DIDN'T HAVE MONEY TO GET MORE.: NEVER TRUE

## 2023-10-12 SDOH — ECONOMIC STABILITY: FOOD INSECURITY: WITHIN THE PAST 12 MONTHS, YOU WORRIED THAT YOUR FOOD WOULD RUN OUT BEFORE YOU GOT MONEY TO BUY MORE.: NEVER TRUE

## 2023-10-12 ASSESSMENT — PATIENT HEALTH QUESTIONNAIRE - PHQ9
2. FEELING DOWN, DEPRESSED OR HOPELESS: 1
SUM OF ALL RESPONSES TO PHQ QUESTIONS 1-9: 2
1. LITTLE INTEREST OR PLEASURE IN DOING THINGS: 1
SUM OF ALL RESPONSES TO PHQ QUESTIONS 1-9: 2
SUM OF ALL RESPONSES TO PHQ9 QUESTIONS 1 & 2: 2
SUM OF ALL RESPONSES TO PHQ QUESTIONS 1-9: 2
SUM OF ALL RESPONSES TO PHQ QUESTIONS 1-9: 2

## 2023-10-13 LAB
A/G RATIO: 1.4 RATIO (ref 1.1–2.6)
ALBUMIN SERPL-MCNC: 4.6 G/DL (ref 3.5–5)
ALP BLD-CCNC: 61 U/L (ref 40–120)
ALT SERPL-CCNC: 5 U/L (ref 5–40)
ANION GAP SERPL CALCULATED.3IONS-SCNC: 12 MMOL/L (ref 3–15)
AST SERPL-CCNC: 15 U/L (ref 10–37)
AVERAGE GLUCOSE: 115 MG/DL (ref 91–123)
BASOPHILS # BLD: 1 % (ref 0–2)
BASOPHILS ABSOLUTE: 0 K/UL (ref 0–0.2)
BILIRUB SERPL-MCNC: 0.6 MG/DL (ref 0.2–1.2)
BUN BLDV-MCNC: 22 MG/DL (ref 6–22)
CALCIUM SERPL-MCNC: 9.4 MG/DL (ref 8.4–10.5)
CHLORIDE BLD-SCNC: 106 MMOL/L (ref 98–110)
CHOLESTEROL/HDL RATIO: 2.7 (ref 0–5)
CHOLESTEROL: 184 MG/DL (ref 110–200)
CO2: 26 MMOL/L (ref 20–32)
CREAT SERPL-MCNC: 1.1 MG/DL (ref 0.8–1.4)
EOSINOPHIL # BLD: 3 % (ref 0–6)
EOSINOPHILS ABSOLUTE: 0.2 K/UL (ref 0–0.5)
GLOBULIN: 3.2 G/DL (ref 2–4)
GLOMERULAR FILTRATION RATE: 49.5 ML/MIN/1.73 SQ.M.
GLUCOSE: 97 MG/DL (ref 70–99)
HBA1C MFR BLD: 5.7 % (ref 4.8–5.6)
HCT VFR BLD CALC: 42.8 % (ref 35.1–48.3)
HDLC SERPL-MCNC: 68 MG/DL
HEMOGLOBIN: 12.9 G/DL (ref 11.7–16.1)
LDL CHOLESTEROL CALCULATED: 99 MG/DL (ref 50–99)
LDL/HDL RATIO: 1.5
LYMPHOCYTES # BLD: 26 % (ref 20–45)
LYMPHOCYTES ABSOLUTE: 1.8 K/UL (ref 1–4.8)
MCH RBC QN AUTO: 26 PG (ref 26–34)
MCHC RBC AUTO-ENTMCNC: 30 G/DL (ref 31–36)
MCV RBC AUTO: 86 FL (ref 80–99)
MONOCYTES ABSOLUTE: 0.5 K/UL (ref 0.1–1)
MONOCYTES: 8 % (ref 3–12)
NEUTROPHILS ABSOLUTE: 4.2 K/UL (ref 1.8–7.7)
NEUTROPHILS: 62 % (ref 40–75)
NON-HDL CHOLESTEROL: 116 MG/DL
PDW BLD-RTO: 14.9 % (ref 10–15.5)
PLATELET # BLD: 249 K/UL (ref 140–440)
PMV BLD AUTO: 11.4 FL (ref 9–13)
POTASSIUM SERPL-SCNC: 4.3 MMOL/L (ref 3.5–5.5)
RBC: 4.98 M/UL (ref 3.8–5.2)
SODIUM BLD-SCNC: 144 MMOL/L (ref 133–145)
TOTAL PROTEIN: 7.8 G/DL (ref 6.2–8.1)
TRIGL SERPL-MCNC: 84 MG/DL (ref 40–149)
TSH SERPL DL<=0.05 MIU/L-ACNC: 0.75 MCU/ML (ref 0.27–4.2)
VLDLC SERPL CALC-MCNC: 17 MG/DL (ref 8–30)
WBC: 6.7 K/UL (ref 4–11)

## 2023-10-13 NOTE — PROGRESS NOTES
Medicare Annual Wellness Visit    Roxie Harmon is here for Medicare AWV, Follow-up Chronic Condition, and Hip Pain (Right leg)    Assessment & Plan    Diagnosis Orders   1. Medicare annual wellness visit, subsequent        2. Parkinson's disease, unspecified whether dyskinesia present, unspecified whether manifestations fluctuate  External Referral To Neurology      3. Pain of right hip  XR HIP 2-3 VW W PELVIS RIGHT    [facility]      4. Chronic pain of right ankle  XR ANKLE RIGHT (MIN 3 VIEWS)      5. Hyperlipidemia, unspecified hyperlipidemia type  Lipid Panel      6. Essential (primary) hypertension  CBC with Auto Differential    Comprehensive Metabolic Panel      7. Hyperglycemia  Hemoglobin A1C      8. Weakness  [facility]      9. Thyroid disorder  TSH      10. Poor balance  [facility]      11. Encounter for immunization  Influenza, FLUAD, (age 72 y+), IM, Preservative Free, 0.5 mL      12. Claudication [facility])  Vascular ankle brachial index (SENG)        Recommendations for Preventive Services Due: see orders and patient instructions/AVS.  Recommended screening schedule for the next 5-10 years is provided to the patient in written form: see Patient Instructions/AVS.     Return in about 3 weeks (around 11/2/2023), or if symptoms worsen or fail to improve, for weakness, parkinsons. Subjective   Roxie Harmon comes in for Medicare wellness exam.    Patient's complete Health Risk Assessment and screening values have been reviewed and are found in Flowsheets. The following problems were reviewed today and where indicated follow up appointments were made and/or referrals ordered.     Positive Risk Factor Screenings with Interventions:    Fall Risk:  Do you feel unsteady or are you worried about falling? : (!) yes  2 or more falls in past year?: no  Fall with injury in past
Patient received Fluad Quad immunization IM to right deltoid. Shetolerated procedure well. Patient was observed for 10 minutes, no adverse effects noted. She left ambulatory with no complaints of pain or distress noted. Patient hasreceived immunizations in office prior to today.
visit on 10/12/23. ASSESSMENT and PLAN    ICD-10-CM    1. Parkinson's disease, unspecified whether dyskinesia present, unspecified whether manifestations fluctuate  G20. A1 External Referral To Neurology      2. Pain of right hip  M25.551 XR HIP 2-3 VW W PELVIS RIGHT     [facility]      3. Chronic pain of right ankle  M25.571 XR ANKLE RIGHT (MIN 3 VIEWS)    G89.29       4. Hyperlipidemia, unspecified hyperlipidemia type  E78.5 Lipid Panel      5. Essential (primary) hypertension  I10 CBC with Auto Differential     Comprehensive Metabolic Panel      6. Hyperglycemia  R73.9 Hemoglobin A1C      7. Weakness  R53.1 [facility]      8. Thyroid disorder  E07.9 TSH      9. Poor balance  R26.89 [facility]      10. Encounter for immunization  Z23 Influenza, FLUAD, (age 72 y+), IM, Preservative Free, 0.5 mL      11. Claudication [facility])  I73.9 Vascular ankle brachial index (SENG)      lab results and schedule of future lab studies reviewed with patient  reviewed diet, exercise and weight control  cardiovascular risk and specific lipid/LDL goals reviewed  reviewed medications and side effects in detail  radiology results and schedule of future radiology studies reviewed with patient      I have discussed the diagnosis with the patient and the intended plan of care as seen in the above orders. The patient has received an after-visit summary and questions were answered concerning future plans. I have discussed medication, side effects, and warnings with the patient in detail. The patient verbalized understanding and is in agreement with the plan of care. The patient will contact the office with any additional concerns. I spent 40 minutes with this established patient. Sky Puga MD    PLEASE NOTE:   This document has been produced using voice recognition software.  Unrecognized

## 2023-10-24 RX ORDER — QUETIAPINE FUMARATE 50 MG/1
50 TABLET, FILM COATED ORAL NIGHTLY
Qty: 30 TABLET | Refills: 2 | Status: SHIPPED | OUTPATIENT
Start: 2023-10-24

## 2023-11-02 ENCOUNTER — HOSPITAL ENCOUNTER (OUTPATIENT)
Facility: HOSPITAL | Age: 78
Setting detail: RECURRING SERIES
Discharge: HOME | End: 2023-11-05
Payer: MEDICARE

## 2023-11-02 ENCOUNTER — OFFICE VISIT (OUTPATIENT)
Facility: CLINIC | Age: 78
End: 2023-11-02

## 2023-11-02 VITALS
RESPIRATION RATE: 20 BRPM | HEIGHT: 65 IN | TEMPERATURE: 98.1 F | SYSTOLIC BLOOD PRESSURE: 138 MMHG | BODY MASS INDEX: 24.32 KG/M2 | HEART RATE: 88 BPM | WEIGHT: 146 LBS | DIASTOLIC BLOOD PRESSURE: 63 MMHG

## 2023-11-02 DIAGNOSIS — R53.1 WEAKNESS: ICD-10-CM

## 2023-11-02 DIAGNOSIS — G47.00 INSOMNIA, UNSPECIFIED TYPE: ICD-10-CM

## 2023-11-02 DIAGNOSIS — F39 MOOD DISORDER (HCC): ICD-10-CM

## 2023-11-02 DIAGNOSIS — G20.A1 PARKINSON'S DISEASE, UNSPECIFIED WHETHER DYSKINESIA PRESENT, UNSPECIFIED WHETHER MANIFESTATIONS FLUCTUATE: Primary | ICD-10-CM

## 2023-11-02 DIAGNOSIS — E78.5 HYPERLIPIDEMIA, UNSPECIFIED HYPERLIPIDEMIA TYPE: ICD-10-CM

## 2023-11-02 PROCEDURE — 97110 THERAPEUTIC EXERCISES: CPT | Performed by: PHYSICAL THERAPIST

## 2023-11-02 PROCEDURE — 97162 PT EVAL MOD COMPLEX 30 MIN: CPT | Performed by: PHYSICAL THERAPIST

## 2023-11-02 SDOH — ECONOMIC STABILITY: INCOME INSECURITY: HOW HARD IS IT FOR YOU TO PAY FOR THE VERY BASICS LIKE FOOD, HOUSING, MEDICAL CARE, AND HEATING?: NOT VERY HARD

## 2023-11-02 SDOH — ECONOMIC STABILITY: FOOD INSECURITY: WITHIN THE PAST 12 MONTHS, THE FOOD YOU BOUGHT JUST DIDN'T LAST AND YOU DIDN'T HAVE MONEY TO GET MORE.: NEVER TRUE

## 2023-11-02 SDOH — ECONOMIC STABILITY: FOOD INSECURITY: WITHIN THE PAST 12 MONTHS, YOU WORRIED THAT YOUR FOOD WOULD RUN OUT BEFORE YOU GOT MONEY TO BUY MORE.: NEVER TRUE

## 2023-11-02 ASSESSMENT — PATIENT HEALTH QUESTIONNAIRE - PHQ9
SUM OF ALL RESPONSES TO PHQ QUESTIONS 1-9: 0
2. FEELING DOWN, DEPRESSED OR HOPELESS: 0
SUM OF ALL RESPONSES TO PHQ QUESTIONS 1-9: 0
SUM OF ALL RESPONSES TO PHQ QUESTIONS 1-9: 0
1. LITTLE INTEREST OR PLEASURE IN DOING THINGS: 0
SUM OF ALL RESPONSES TO PHQ QUESTIONS 1-9: 0
SUM OF ALL RESPONSES TO PHQ9 QUESTIONS 1 & 2: 0

## 2023-11-02 NOTE — PROGRESS NOTES
1. \"Have you been to the ER, urgent care clinic since your last visit? Hospitalized since your last visit? \"No    2. \"Have you seen or consulted any other health care providers outside of the [address] [place] [address] since your last visit? \" No    3. For patients aged 43-73: Has the patient had a colonoscopy / FIT/ Cologuard? Yes      If the patient is female:    4. For patients aged 43-66: Has the patient had a mammogram within the past 2 years? Yes      5. For patients aged 21-65: Has the patient had a pap smear?  Not applicable

## 2023-11-02 NOTE — THERAPY EVALUATION
PT DAILY TREATMENT NOTE/HIP [id number]      Patient Name: Mili Brink    Date: 2023    : 1945  Insurance: Payor: MEDICARE / Plan: MEDICARE PART A AND B / Product Type: *No Product type* /      Patient  verified yes     Visit #   Current / Total 1 10   Time   In / Out 1:40 2:20   Pain   In / Out 0 0   Subjective Functional Status/Changes: Patient reports insidious onset right hip pain. Diagnosed with Parkinson's Disease. Treatment Area: Right hip pain [M25.551]  Muscle weakness (generalized) [M62.81]    SUBJECTIVE  Pain Level (0-10 scale): 0/10 now; 0/10 at best; 8/10 at worst(usually at night). [x]constant []intermittent []improving []worsening []no change since onset    Any medication changes, allergies to medications, adverse drug reactions, diagnosis change, or new procedure performed?: [x] No    [] Yes (see summary sheet for update)  Subjective functional status/changes:     PLOF: Light housekeeping, laundry, a little cooking. Limitations to PLOF: Pain limits her squatting, has to sit down more frequently. Mechanism of Injury: Insidious onset of right hip symptoms. Current symptoms/Complaints: Patient with intermittent right hip pain. Pain is worse at night. Pain is located over the lateral hip. Previous Treatment/Compliance: None  PMHx/Surgical Hx: HTN, Parkinson's Disease  Work Hx: Retired  Living Situation: Single level home  Pt Goals: \"Walk better\"  Barriers: [x]pain []financial []time []transportation []other  Cognition: A & O x 3    Other:    OBJECTIVE/EXAMINATION  Domestic Life: Her son stays with her. Activity/Recreational Limitations: None  Mobility: ambulates with [facility]  Self Care: Independent but sometimes needs help. 30 min [x]Eval  - untimed                  Therapeutic Procedures:   Tx Min Billable or 1:1 Min (if diff from Tx Min) Procedure, Rationale, Specifics   10  30560 Therapeutic Exercise (timed):  increase ROM, strength, coordination, balance, and
structure, function, activity limitation and / or participation in recreation  ;Presentation:  MEDIUM Complexity : Evolving with changing characteristics  ; Clinical Decision Making:  MEDIUM Complexity : FOTO score of 26-74 FOTO score = an established functional score where 100 = no disability  Overall Complexity Rating: MEDIUM  Problem List: pain affecting function, decrease ROM, decrease strength, impaired gait/balance, decrease ADL/functional abilities, decrease activity tolerance, and decrease flexibility/joint mobility   Treatment Plan may include any combination of the followin Therapeutic Exercise, 62720 Neuromuscular Re-Education, 23654 Manual Therapy, 50242 Therapeutic Activity, and 12448 Self Care/Home Management  Patient / Family readiness to learn indicated by: asking questions, trying to perform skills, interest, return verbalization , and return demonstration   Persons(s) to be included in education: patient (P) and family support person (FSP); list patient's daughter  Barriers to Learning/Limitations: physical  Measures taken if barriers to learning present: Patient has Parkinson's. Needs to be guarded when ambulating. Patient Goal (s): \"Walk better\"  Patient Self Reported Health Status: fair  Rehabilitation Potential: fair    Short Term Goals: To be accomplished in 1-2 treatments  1. Patient will become proficient in their HEP and will be compliant in performing that program.  Evaluation:   Patient given a written/illustrated HEP. Long Term Goals: To be accomplished in 10 treatments  1. Patient's pain level will be 0-4/10 with activity in order to improve patient's ability to perform normal ADLs. Evaluation:  0/10-8/10  2. Patient will demonstrate  0-15 hip abd; 0 30 hip IR AROM right hip to increase ease of ADLs. Evaluation:  Right hip abd 0-10, IR 0-20. 3. Patient will increase FOTO score by 5 points to indicate increased functional mobility.   Evaluation:  Deferred FOTO to 2nd

## 2023-11-02 NOTE — PROGRESS NOTES
[facility]  Moody Vasquez comes in for follow-up care. She is accompanied by her daughter. Parkinson's disease: Patient has Parkinson's disease. She has shuffling gait. She is on Sinemet. Symptoms seem to have been getting worse. Daughter states that she does not take medication as prescribed. She is supposed to take 2 tablets 3 times a day. She has been taking this only twice a day. I encouraged patient to take the medication 3 times a day. She has a follow-up scheduled to see the neurologist.  She should use a cane or walker as needed. She has an appointment to have physical therapy done for balance and the generalized weakness. We discussed supportive care measures. Generalized weakness: Patient has generalized malaise and fatigue with weakness. She denies fever or chills. She we will have physical therapy done. This will also help with her balance. Mood disorder: Patient has mood disorder. She takes Seroquel and Remeron. Stable on the medication. Dyslipidemia: Patient has dyslipidemia. Patient is on atorvastatin. She will take a diet low in polysaturated fats. Insomnia: Patient has insomnia. She has trouble staying asleep and getting to sleep. She is on Seroquel and mirtazapine. She will continue with the medication. Patient will also practice good sleep hygiene techniques. CKD: Patient has chronic kidney disease stage III. Patient will intensify lifestyle and dietary modification.       Past Medical History  Past Medical History:   Diagnosis Date    Hypertension     Parkinson disease        Surgical History  Past Surgical History:   Procedure Laterality Date    CATARACT REMOVAL      COLONOSCOPY  2019    HYSTERECTOMY (CERVIX STATUS UNKNOWN)  1990    SMALL INTESTINE SURGERY  2008        Medications  Current Outpatient Medications   Medication Sig Dispense Refill    carbidopa-levodopa (SINEMET)  MG per tablet TAKE 2 TABLETS BY MOUTH 3 TIMES A  tablet 1    QUEtiapine (SEROQUEL)

## 2023-11-14 ENCOUNTER — TELEPHONE (OUTPATIENT)
Facility: HOSPITAL | Age: 78
End: 2023-11-14

## 2023-11-27 ENCOUNTER — TELEPHONE (OUTPATIENT)
Facility: HOSPITAL | Age: 78
End: 2023-11-27

## 2023-11-27 NOTE — TELEPHONE ENCOUNTER
Called patient's daughter to schedule follow up visits.  Patient's daughter is requesting to be placed on the wait list for 11/29 and 12/1 around 1pm or 2pm.

## 2023-12-21 PROBLEM — F33.0 MAJOR DEPRESSIVE DISORDER, RECURRENT, MILD (HCC): Status: ACTIVE | Noted: 2023-12-21

## 2023-12-21 PROBLEM — N18.30 CHRONIC RENAL DISEASE, STAGE III (HCC): Status: ACTIVE | Noted: 2023-12-21

## 2023-12-21 PROBLEM — F33.9 MAJOR DEPRESSIVE DISORDER, RECURRENT, UNSPECIFIED (HCC): Status: ACTIVE | Noted: 2023-12-21

## 2023-12-21 PROBLEM — F33.1 MAJOR DEPRESSIVE DISORDER, RECURRENT, MODERATE (HCC): Status: ACTIVE | Noted: 2023-12-21

## 2024-01-18 RX ORDER — QUETIAPINE FUMARATE 50 MG/1
50 TABLET, FILM COATED ORAL NIGHTLY
Qty: 30 TABLET | Refills: 2 | Status: SHIPPED | OUTPATIENT
Start: 2024-01-18

## 2024-02-08 DIAGNOSIS — E78.5 HYPERLIPIDEMIA, UNSPECIFIED: ICD-10-CM

## 2024-02-08 RX ORDER — ATORVASTATIN CALCIUM 10 MG/1
TABLET, FILM COATED ORAL
Qty: 90 TABLET | Refills: 1 | Status: SHIPPED | OUTPATIENT
Start: 2024-02-08

## 2024-02-11 DIAGNOSIS — G20.A1 PARKINSON'S DISEASE, UNSPECIFIED WHETHER DYSKINESIA PRESENT, UNSPECIFIED WHETHER MANIFESTATIONS FLUCTUATE: ICD-10-CM

## 2024-04-15 DIAGNOSIS — G20.A1 PARKINSON'S DISEASE, UNSPECIFIED WHETHER DYSKINESIA PRESENT, UNSPECIFIED WHETHER MANIFESTATIONS FLUCTUATE (HCC): ICD-10-CM

## 2024-04-17 DIAGNOSIS — M81.0 AGE-RELATED OSTEOPOROSIS WITHOUT CURRENT PATHOLOGICAL FRACTURE: ICD-10-CM

## 2024-04-17 DIAGNOSIS — R63.0 ANOREXIA: ICD-10-CM

## 2024-04-17 RX ORDER — MIRTAZAPINE 15 MG/1
15 TABLET, FILM COATED ORAL NIGHTLY
Qty: 90 TABLET | Refills: 1 | Status: SHIPPED | OUTPATIENT
Start: 2024-04-17

## 2024-04-17 RX ORDER — ALENDRONATE SODIUM 35 MG/1
TABLET ORAL
Qty: 13 TABLET | Refills: 3 | Status: SHIPPED | OUTPATIENT
Start: 2024-04-17

## 2024-04-17 NOTE — TELEPHONE ENCOUNTER
Last seen 12/21/2023   Last labs   Last filled  6/2023 and 8/2023  Next appointment 10/21/2024     Lab Results   Component Value Date     10/13/2023    K 4.3 10/13/2023     10/13/2023    CO2 26 10/13/2023    BUN 22 10/13/2023    CREATININE 1.1 10/13/2023    GLUCOSE 97 10/13/2023    CALCIUM 9.4 10/13/2023    PROT 7.8 10/13/2023    BILITOT 0.6 10/13/2023    ALKPHOS 61 10/13/2023    AST 15 10/13/2023    ALT 5 10/13/2023    LABGLOM 51.7 (L) 01/25/2022    GFRAA >60.0 01/25/2022    AGRATIO 1.4 10/13/2023    GLOB 3.2 10/13/2023

## 2024-05-15 RX ORDER — QUETIAPINE FUMARATE 50 MG/1
50 TABLET, FILM COATED ORAL
Qty: 90 TABLET | Refills: 1 | Status: SHIPPED | OUTPATIENT
Start: 2024-05-15

## 2024-06-10 DIAGNOSIS — G20.A1 PARKINSON'S DISEASE, UNSPECIFIED WHETHER DYSKINESIA PRESENT, UNSPECIFIED WHETHER MANIFESTATIONS FLUCTUATE (HCC): ICD-10-CM

## 2024-06-10 NOTE — TELEPHONE ENCOUNTER
Last seen 12/21/2023   Last labs 10/13/2023  Last filled  04/16/2024  Next appointment 10/21/2024     Lab Results   Component Value Date     10/13/2023    K 4.3 10/13/2023     10/13/2023    CO2 26 10/13/2023    BUN 22 10/13/2023    CREATININE 1.1 10/13/2023    GLUCOSE 97 10/13/2023    CALCIUM 9.4 10/13/2023    BILITOT 0.6 10/13/2023    ALKPHOS 61 10/13/2023    AST 15 10/13/2023    ALT 5 10/13/2023    LABGLOM 49.5 (L) 10/13/2023    GFRAA >60.0 01/25/2022    AGRATIO 1.4 10/13/2023    GLOB 3.2 10/13/2023

## 2024-07-25 ENCOUNTER — OFFICE VISIT (OUTPATIENT)
Facility: CLINIC | Age: 79
End: 2024-07-25

## 2024-07-25 ENCOUNTER — TELEPHONE (OUTPATIENT)
Facility: CLINIC | Age: 79
End: 2024-07-25

## 2024-07-25 ENCOUNTER — HOSPITAL ENCOUNTER (OUTPATIENT)
Facility: HOSPITAL | Age: 79
Setting detail: SPECIMEN
Discharge: HOME | End: 2024-07-25
Payer: MEDICARE

## 2024-07-25 VITALS
DIASTOLIC BLOOD PRESSURE: 58 MMHG | WEIGHT: 150 LBS | SYSTOLIC BLOOD PRESSURE: 114 MMHG | OXYGEN SATURATION: 96 % | RESPIRATION RATE: 20 BRPM | BODY MASS INDEX: 25.61 KG/M2 | HEIGHT: 64 IN | HEART RATE: 84 BPM | TEMPERATURE: 97.8 F

## 2024-07-25 DIAGNOSIS — I73.9 CLAUDICATION (HCC): ICD-10-CM

## 2024-07-25 DIAGNOSIS — R26.89 POOR BALANCE: ICD-10-CM

## 2024-07-25 DIAGNOSIS — R31.9 HEMATURIA, UNSPECIFIED TYPE: Primary | ICD-10-CM

## 2024-07-25 DIAGNOSIS — N18.31 STAGE 3A CHRONIC KIDNEY DISEASE (HCC): ICD-10-CM

## 2024-07-25 DIAGNOSIS — F33.1 MAJOR DEPRESSIVE DISORDER, RECURRENT, MODERATE (HCC): ICD-10-CM

## 2024-07-25 DIAGNOSIS — M79.604 PAIN OF RIGHT LOWER EXTREMITY: ICD-10-CM

## 2024-07-25 DIAGNOSIS — E78.5 HYPERLIPIDEMIA, UNSPECIFIED HYPERLIPIDEMIA TYPE: ICD-10-CM

## 2024-07-25 DIAGNOSIS — R53.1 WEAKNESS: ICD-10-CM

## 2024-07-25 DIAGNOSIS — R30.0 DYSURIA: ICD-10-CM

## 2024-07-25 DIAGNOSIS — E07.9 THYROID DISORDER: ICD-10-CM

## 2024-07-25 DIAGNOSIS — G20.A1 PARKINSON'S DISEASE, UNSPECIFIED WHETHER DYSKINESIA PRESENT, UNSPECIFIED WHETHER MANIFESTATIONS FLUCTUATE (HCC): Primary | ICD-10-CM

## 2024-07-25 DIAGNOSIS — F39 MOOD DISORDER (HCC): ICD-10-CM

## 2024-07-25 DIAGNOSIS — R73.9 HYPERGLYCEMIA: ICD-10-CM

## 2024-07-25 LAB
A/G RATIO: 1.1 RATIO (ref 1.1–2.6)
ALBUMIN: 3.7 G/DL (ref 3.5–5)
ALP BLD-CCNC: 56 U/L (ref 40–120)
ALT SERPL-CCNC: 6 U/L (ref 5–40)
ANION GAP SERPL CALCULATED.3IONS-SCNC: 8 MMOL/L (ref 3–15)
APPEARANCE UR: CLEAR
AST SERPL-CCNC: 15 U/L (ref 10–37)
BACTERIA URNS QL MICRO: NEGATIVE /HPF
BASOPHILS ABSOLUTE: 0 K/UL (ref 0–0.2)
BASOPHILS RELATIVE PERCENT: 1 % (ref 0–2)
BILIRUB SERPL-MCNC: 0.3 MG/DL (ref 0.2–1.2)
BILIRUB UR QL: NEGATIVE
BILIRUBIN, URINE, POC: NEGATIVE
BLOOD URINE, POC: NORMAL
BUN BLDV-MCNC: 34 MG/DL (ref 6–22)
CALCIUM SERPL-MCNC: 9.3 MG/DL (ref 8.4–10.5)
CHLORIDE BLD-SCNC: 107 MMOL/L (ref 98–110)
CHOLESTEROL, TOTAL: 115 MG/DL (ref 110–200)
CHOLESTEROL/HDL RATIO: 2.5 (ref 0–5)
CO2: 27 MMOL/L (ref 20–32)
COLOR UR: YELLOW
CREAT SERPL-MCNC: 1.3 MG/DL (ref 0.8–1.4)
EOSINOPHIL # BLD: 4 % (ref 0–6)
EOSINOPHILS ABSOLUTE: 0.3 K/UL (ref 0–0.5)
EPITH CASTS URNS QL MICRO: ABNORMAL /LPF (ref 0–5)
ESTIMATED AVERAGE GLUCOSE: 120 MG/DL (ref 91–123)
GFR, ESTIMATED: 40.7 ML/MIN/1.73 SQ.M.
GLOBULIN: 3.3 G/DL (ref 2–4)
GLUCOSE UR STRIP.AUTO-MCNC: NEGATIVE MG/DL
GLUCOSE URINE, POC: NEGATIVE
GLUCOSE: 102 MG/DL (ref 70–99)
HBA1C MFR BLD: 5.8 % (ref 4.8–5.6)
HCT VFR BLD CALC: 36.5 % (ref 35.1–48.3)
HDLC SERPL-MCNC: 46 MG/DL
HEMOGLOBIN: 11.1 G/DL (ref 11.7–16.1)
HGB UR QL STRIP: ABNORMAL
HYALINE CASTS URNS QL MICRO: ABNORMAL /LPF (ref 0–2)
KETONES UR QL STRIP.AUTO: NEGATIVE MG/DL
KETONES, URINE, POC: NEGATIVE
LDL CHOLESTEROL: 60 MG/DL (ref 50–99)
LDL/HDL RATIO: 1.3
LEUKOCYTE ESTERASE UR QL STRIP.AUTO: NEGATIVE
LEUKOCYTE ESTERASE, URINE, POC: NEGATIVE
LYMPHOCYTES # BLD: 24 % (ref 20–45)
LYMPHOCYTES ABSOLUTE: 1.8 K/UL (ref 1–4.8)
MCH RBC QN AUTO: 26 PG (ref 26–34)
MCHC RBC AUTO-ENTMCNC: 30 G/DL (ref 31–36)
MCV RBC AUTO: 84 FL (ref 80–99)
MONOCYTES ABSOLUTE: 0.7 K/UL (ref 0.1–1)
MONOCYTES: 9 % (ref 3–12)
MUCOUS THREADS URNS QL MICRO: ABNORMAL /LPF
NEUTROPHILS ABSOLUTE: 4.9 K/UL (ref 1.8–7.7)
NEUTROPHILS: 64 % (ref 40–75)
NITRITE UR QL STRIP.AUTO: NEGATIVE
NITRITE, URINE, POC: NORMAL
NON-HDL CHOLESTEROL: 69 MG/DL
PDW BLD-RTO: 14.2 % (ref 10–15.5)
PH UR STRIP: 5.5 (ref 5–8)
PH, URINE, POC: 5.5 (ref 4.6–8)
PLATELET # BLD: 317 K/UL (ref 140–440)
PMV BLD AUTO: 10.9 FL (ref 9–13)
POTASSIUM SERPL-SCNC: 4.4 MMOL/L (ref 3.5–5.5)
PROT UR STRIP-MCNC: ABNORMAL MG/DL
PROTEIN,URINE, POC: NORMAL
RBC # BLD: 4.35 M/UL (ref 3.8–5.2)
RBC #/AREA URNS HPF: ABNORMAL /HPF (ref 0–5)
SODIUM BLD-SCNC: 142 MMOL/L (ref 133–145)
SP GR UR REFRACTOMETRY: >1.03 (ref 1–1.03)
SPECIFIC GRAVITY, URINE, POC: 1.02 (ref 1–1.03)
T4 FREE: 1.3 NG/DL (ref 0.9–1.8)
TOTAL PROTEIN: 7 G/DL (ref 6.2–8.1)
TRIGL SERPL-MCNC: 43 MG/DL (ref 40–149)
TSH SERPL DL<=0.05 MIU/L-ACNC: 0.79 MCU/ML (ref 0.27–4.2)
URINALYSIS CLARITY, POC: CLEAR
URINALYSIS COLOR, POC: YELLOW
UROBILINOGEN UR QL STRIP.AUTO: 0.2 EU/DL (ref 0.2–1)
UROBILINOGEN, POC: NORMAL
VLDLC SERPL CALC-MCNC: 9 MG/DL (ref 8–30)
WBC # BLD: 7.6 K/UL (ref 4–11)
WBC URNS QL MICRO: ABNORMAL /HPF (ref 0–4)

## 2024-07-25 PROCEDURE — 81001 URINALYSIS AUTO W/SCOPE: CPT

## 2024-07-25 NOTE — PROGRESS NOTES
1. \"Have you been to the ER, urgent care clinic since your last visit?  Hospitalized since your last visit?\"No    2. \"Have you seen or consulted any other health care providers outside of the [facility] since your last visit?\" No    3. For patients aged 45-75: Has the patient had a colonoscopy / FIT/ Cologuard? Not applicable      If the patient is female:    4. For patients aged 40-74: Has the patient had a mammogram within the past 2 years? Not applicable      5. For patients aged 21-65: Has the patient had a pap smear? Not applicable    
Resource Strain (CARDIA)     Difficulty of Paying Living Expenses: Not hard at all   Food Insecurity: No Food Insecurity (7/25/2024)    Hunger Vital Sign     Worried About Running Out of Food in the Last Year: Never true     Ran Out of Food in the Last Year: Never true   Transportation Needs: Unknown (7/25/2024)    PRAPARE - Transportation     Lack of Transportation (Medical): Not on file     Lack of Transportation (Non-Medical): No   Physical Activity: Inactive (10/12/2023)    Exercise Vital Sign     Days of Exercise per Week: 0 days     Minutes of Exercise per Session: 0 min   Stress: Not on file   Social Connections: Not on file   Intimate Partner Violence: Not on file   Housing Stability: Unknown (7/25/2024)    Housing Stability Vital Sign     Unable to Pay for Housing in the Last Year: Not on file     Number of Places Lived in the Last Year: Not on file     Unstable Housing in the Last Year: No       Review of Systems  Review of Systems - Review of all systems is negative except as noted above in the HPI.    Vital Signs  BP (!) 114/58   Pulse 84   Temp 97.8 °F (36.6 °C)   Resp 20   Ht 1.626 m (5' 4\")   Wt 68 kg (150 lb)   SpO2 96%   BMI 25.75 kg/m²       Physical Exam  Physical Examination: General appearance - chronically ill appearing  Mental status - inappropriate safety awareness  Lymphatics - no palpable lymphadenopathy  Chest - decreased air entry noted bilateral lung bases  Heart - S1 and S2 normal  Abdomen - no rebound tenderness noted  Back exam - limited range of motion  Neurological - abnormal neurological exam unchanged from prior examinations  Musculoskeletal - osteoarthritic changes noted in both hands  Extremities - intact peripheral pulses    Results  No results found for this visit on 07/25/24.    ASSESSMENT and PLAN    ICD-10-CM    1. Parkinson's disease, unspecified whether dyskinesia present, unspecified whether manifestations fluctuate ([facility])  G20.A1 External Referral To Neurology

## 2024-07-25 NOTE — TELEPHONE ENCOUNTER
Message left on voicemail that CT scan will be calling to schedule and a referral to urology will be done due to having blood in the urine at today's visit

## 2024-07-26 DIAGNOSIS — N18.32 STAGE 3B CHRONIC KIDNEY DISEASE (HCC): Primary | ICD-10-CM

## 2024-08-08 ENCOUNTER — HOSPITAL ENCOUNTER (OUTPATIENT)
Facility: HOSPITAL | Age: 79
Discharge: HOME | End: 2024-08-10
Payer: MEDICARE

## 2024-08-08 DIAGNOSIS — I73.9 CLAUDICATION (HCC): ICD-10-CM

## 2024-08-08 LAB
VAS LEFT ABI: 1
VAS LEFT ARM BP: 183 MMHG
VAS LEFT DORSALIS PEDIS BP: 186 MMHG
VAS LEFT PTA BP: 183 MMHG
VAS LEFT TBI: 0.91
VAS LEFT TOE PRESSURE: 169 MMHG
VAS RIGHT ABI: 1.16
VAS RIGHT ARM BP: 186 MMHG
VAS RIGHT DORSALIS PEDIS BP: 205 MMHG
VAS RIGHT PTA BP: 215 MMHG
VAS RIGHT TBI: 1.02
VAS RIGHT TOE PRESSURE: 190 MMHG

## 2024-08-08 PROCEDURE — 93922 UPR/L XTREMITY ART 2 LEVELS: CPT

## 2024-08-09 DIAGNOSIS — E78.5 HYPERLIPIDEMIA, UNSPECIFIED: ICD-10-CM

## 2024-08-09 NOTE — TELEPHONE ENCOUNTER
Last seen 7/25/2024   Last labs 07/25/2024  Last filled  02/08/2024  Next appointment 10/21/2024     Lab Results   Component Value Date     07/25/2024    K 4.4 07/25/2024     07/25/2024    CO2 27 07/25/2024    BUN 34 (H) 07/25/2024    CREATININE 1.3 07/25/2024    GLUCOSE 102 (H) 07/25/2024    CALCIUM 9.3 07/25/2024    BILITOT 0.3 07/25/2024    ALKPHOS 56 07/25/2024    AST 15 07/25/2024    ALT 6 07/25/2024    LABGLOM 40.7 (L) 07/25/2024    GFRAA >60.0 01/25/2022    AGRATIO 1.1 07/25/2024    GLOB 3.3 07/25/2024

## 2024-08-12 RX ORDER — ATORVASTATIN CALCIUM 10 MG/1
TABLET, FILM COATED ORAL
Qty: 90 TABLET | Refills: 1 | Status: SHIPPED | OUTPATIENT
Start: 2024-08-12

## 2024-08-13 DIAGNOSIS — G20.A1 PARKINSON'S DISEASE, UNSPECIFIED WHETHER DYSKINESIA PRESENT, UNSPECIFIED WHETHER MANIFESTATIONS FLUCTUATE (HCC): ICD-10-CM

## 2024-08-14 ENCOUNTER — HOSPITAL ENCOUNTER (OUTPATIENT)
Facility: HOSPITAL | Age: 79
Discharge: HOME | End: 2024-08-17
Payer: MEDICARE

## 2024-08-14 DIAGNOSIS — R31.9 HEMATURIA, UNSPECIFIED TYPE: ICD-10-CM

## 2024-08-14 PROCEDURE — 74176 CT ABD & PELVIS W/O CONTRAST: CPT

## 2024-09-05 ENCOUNTER — TELEPHONE (OUTPATIENT)
Facility: CLINIC | Age: 79
End: 2024-09-05

## 2024-09-05 DIAGNOSIS — N28.1 RENAL CYST: ICD-10-CM

## 2024-09-05 DIAGNOSIS — I73.9 CLAUDICATION (HCC): Primary | ICD-10-CM

## 2024-09-05 DIAGNOSIS — R91.1 PULMONARY NODULE: ICD-10-CM

## 2024-09-05 NOTE — TELEPHONE ENCOUNTER
Called back and spoke to daughter again as there were 2 more test results.  They already got an appt with urology and will wait for the appt with pulmonology.  If they have not heard anything in 2 weeks they will let us know. Daughter understood results.

## 2024-09-05 NOTE — TELEPHONE ENCOUNTER
Called patient to give test results per Dr. Diaz's instructions and patient understood.  Daughter was also on the phone and said they received call this morning and the apt was made for this month to see the vascular doctor, she could not remember the exact date but it was in the month of September.

## 2024-09-05 NOTE — TELEPHONE ENCOUNTER
----- Message from Dr. Dari Diaz MD sent at 9/5/2024  7:42 AM EDT -----  Please let patient know that SENG test result indicates good blood flow in her large vessel but slightly reduced circulation in this very small blood vessels in the toes.  I will refer her to the vascular specialist for evaluation and management and opinion.  ----- Message -----  From: Reji Ho MD  Sent: 8/12/2024  10:43 AM EDT  To: Dari Diaz MD

## 2024-09-05 NOTE — TELEPHONE ENCOUNTER
----- Message from Dr. Dari Diaz MD sent at 9/5/2024  7:45 AM EDT -----  Please let patient know CT scan of the abdomen and pelvis noted multiple small cysts of her kidneys.  I will refer her to the urologist for evaluation and follow-up.  She was also noted to have 1 nodule at the base of the lungs.  I will refer her to the pulmonologist for evaluation and opinion.  ----- Message -----  From: Devonte Jones Incoming Orders Results To [facility]  Sent: 8/22/2024  11:54 AM EDT  To: Dari Diaz MD

## 2024-09-10 ENCOUNTER — CLINICAL DOCUMENTATION (OUTPATIENT)
Age: 79
End: 2024-09-10

## 2024-09-14 DIAGNOSIS — G20.A1 PARKINSON'S DISEASE, UNSPECIFIED WHETHER DYSKINESIA PRESENT, UNSPECIFIED WHETHER MANIFESTATIONS FLUCTUATE (HCC): ICD-10-CM

## 2024-09-16 RX ORDER — CARBIDOPA AND LEVODOPA 25; 100 MG/1; MG/1
TABLET ORAL
Qty: 540 TABLET | Refills: 1 | Status: SHIPPED | OUTPATIENT
Start: 2024-09-16

## 2024-10-01 ENCOUNTER — TELEPHONE (OUTPATIENT)
Facility: CLINIC | Age: 79
End: 2024-10-01

## 2024-10-01 NOTE — TELEPHONE ENCOUNTER
Pt daughter Aparna St called in today stating that her mother will need a Medical Necessity Letter stated needs nurse care for daily activity due to her having parkinson disease sent over to insurance provider Tyrese GUADARRAMA

## 2024-10-03 ENCOUNTER — OFFICE VISIT (OUTPATIENT)
Age: 79
End: 2024-10-03
Payer: MEDICARE

## 2024-10-03 VITALS
HEIGHT: 65 IN | BODY MASS INDEX: 28.32 KG/M2 | DIASTOLIC BLOOD PRESSURE: 70 MMHG | WEIGHT: 170 LBS | SYSTOLIC BLOOD PRESSURE: 118 MMHG | OXYGEN SATURATION: 100 % | HEART RATE: 70 BPM

## 2024-10-03 DIAGNOSIS — I73.9 PAD (PERIPHERAL ARTERY DISEASE) (HCC): Primary | ICD-10-CM

## 2024-10-03 DIAGNOSIS — I87.2 VENOUS INSUFFICIENCY (CHRONIC) (PERIPHERAL): ICD-10-CM

## 2024-10-03 PROCEDURE — 1036F TOBACCO NON-USER: CPT | Performed by: SURGERY

## 2024-10-03 PROCEDURE — 1123F ACP DISCUSS/DSCN MKR DOCD: CPT | Performed by: SURGERY

## 2024-10-03 PROCEDURE — G8484 FLU IMMUNIZE NO ADMIN: HCPCS | Performed by: SURGERY

## 2024-10-03 PROCEDURE — 99203 OFFICE O/P NEW LOW 30 MIN: CPT | Performed by: SURGERY

## 2024-10-03 PROCEDURE — 1090F PRES/ABSN URINE INCON ASSESS: CPT | Performed by: SURGERY

## 2024-10-03 PROCEDURE — G8419 CALC BMI OUT NRM PARAM NOF/U: HCPCS | Performed by: SURGERY

## 2024-10-03 PROCEDURE — G8427 DOCREV CUR MEDS BY ELIG CLIN: HCPCS | Performed by: SURGERY

## 2024-10-03 PROCEDURE — G8399 PT W/DXA RESULTS DOCUMENT: HCPCS | Performed by: SURGERY

## 2024-10-04 NOTE — PROGRESS NOTES
Order for mara placed per verbal order from Dr. Traore  
for excessive thirst   Skin: negative for rash   Neurological: negative for paralysis   Psychiatric: negative for depression        Physical Exam:    /70   Pulse 70   Ht 1.651 m (5' 5\")   Wt 77.1 kg (170 lb)   SpO2 100%   BMI 28.29 kg/m²      Constitutional:  Patient is well developed, mildly overweight, and not acutely distressed.   HEENT: atraumatic, normocephalic, normal hearing, trachea midline     eyes:   Mild pallor no scleral icterus  Neck:   No JVD present. There is no Carotid bruit.    Cardiovascular:  Normal rate, regular rhythm, normal heart sounds. No murmur heard.  Pulses:       Radial pulses +2 palpable.  Femoral pulses +2 palpable.  Pedal pulses are not palpable.  Strong dopplerable biphasic DP and PT signals.  Small varicose veins in the legs, no swelling or skin changes of venous insufficiency.    Pulmonary/Chest: Effort normal and breath sounds normal.  she has no wheezes or no rales.   Abdominal:  Bowel sounds are present. Soft. No tenderness to palpation.   Extremities: . No edema. Digits no cyanosis or clubbing  Neurological:  she  is alert and oriented x3 .  Walks with a cane. Motor & sensory grossly nonfocal.   Psych: Appropriate mood and affect.   Skin:  Skin is warm and dry. No rash noted. No erythema. No ulcers.     Impression and Plan:   79 y.o. female with pain in the lower extremities-which is chronic.  She has multiple medical problems including Parkinson's disease, balance disorder memory disorder, mood disorder, and dyslipidemia and chronic kidney disease.    Her symptoms are not suggestive of PAD.  She may be having small vessel disease.  She has small prominent veins in the legs.    Suggest elevation of the legs when sitting.  Suggest aspirin 81 mg, if no medical contraindications.  Follow-up in 1 year with [facility], for PAD surveillance.    The treatment plan was reviewed with the patient in detail.  The patient voiced understanding of this plan and all questions and

## 2024-10-07 ENCOUNTER — TELEPHONE (OUTPATIENT)
Facility: CLINIC | Age: 79
End: 2024-10-07

## 2024-10-07 NOTE — TELEPHONE ENCOUNTER
----- Message from Nissa QUINONES sent at 10/7/2024 10:42 AM EDT -----  Regarding: E[facility] Message to Provider  ECC Message to Provider    Relationship to Patient: Guardian      Additional Information: Caller Aparna her daughter called last Tuesday to request for a medical necessity care cover letter and didn't received any updates and call coming from the practice. This is a follow up message and please call back this number [phone].   --------------------------------------------------------------------------------------------------------------------------    Call Back Information: OK to leave message on voicemail  Preferred Call Back Number: Phone [phone]

## 2024-10-19 DIAGNOSIS — R63.0 ANOREXIA: ICD-10-CM

## 2024-10-21 ENCOUNTER — OFFICE VISIT (OUTPATIENT)
Facility: CLINIC | Age: 79
End: 2024-10-21

## 2024-10-21 VITALS
HEART RATE: 83 BPM | DIASTOLIC BLOOD PRESSURE: 67 MMHG | TEMPERATURE: 97.8 F | HEIGHT: 65 IN | OXYGEN SATURATION: 100 % | BODY MASS INDEX: 25.66 KG/M2 | WEIGHT: 154 LBS | SYSTOLIC BLOOD PRESSURE: 105 MMHG | RESPIRATION RATE: 20 BRPM

## 2024-10-21 DIAGNOSIS — G20.A1 PARKINSON'S DISEASE, UNSPECIFIED WHETHER DYSKINESIA PRESENT, UNSPECIFIED WHETHER MANIFESTATIONS FLUCTUATE (HCC): ICD-10-CM

## 2024-10-21 DIAGNOSIS — Z23 FLU VACCINE NEED: ICD-10-CM

## 2024-10-21 DIAGNOSIS — R63.0 ANOREXIA: ICD-10-CM
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
## 2024-10-21 DIAGNOSIS — E78.5 HYPERLIPIDEMIA, UNSPECIFIED HYPERLIPIDEMIA TYPE: ICD-10-CM

## 2024-10-21 DIAGNOSIS — Z00.00 MEDICARE ANNUAL WELLNESS VISIT, SUBSEQUENT: Primary | ICD-10-CM

## 2024-10-21 DIAGNOSIS — F39 MOOD DISORDER (HCC): ICD-10-CM

## 2024-10-21 RX ORDER — QUETIAPINE FUMARATE 50 MG/1
50 TABLET, FILM COATED ORAL
Qty: 90 TABLET | Refills: 1 | Status: SHIPPED | OUTPATIENT
Start: 2024-10-21

## 2024-10-21 RX ORDER — MIRTAZAPINE 15 MG/1
15 TABLET, FILM COATED ORAL NIGHTLY
Qty: 90 TABLET | Refills: 1 | Status: SHIPPED | OUTPATIENT
Start: 2024-10-21

## 2024-10-21 RX ORDER — MIRTAZAPINE 15 MG/1
15 TABLET, FILM COATED ORAL NIGHTLY
Qty: 90 TABLET | Refills: 1 | OUTPATIENT
Start: 2024-10-21

## 2024-10-21 ASSESSMENT — PATIENT HEALTH QUESTIONNAIRE - PHQ9
1. LITTLE INTEREST OR PLEASURE IN DOING THINGS: SEVERAL DAYS
5. POOR APPETITE OR OVEREATING: NOT AT ALL
4. FEELING TIRED OR HAVING LITTLE ENERGY: NOT AT ALL
SUM OF ALL RESPONSES TO PHQ QUESTIONS 1-9: 2
9. THOUGHTS THAT YOU WOULD BE BETTER OFF DEAD, OR OF HURTING YOURSELF: NOT AT ALL
6. FEELING BAD ABOUT YOURSELF - OR THAT YOU ARE A FAILURE OR HAVE LET YOURSELF OR YOUR FAMILY DOWN: NOT AT ALL
3. TROUBLE FALLING OR STAYING ASLEEP: NOT AT ALL
SUM OF ALL RESPONSES TO PHQ9 QUESTIONS 1 & 2: 2
SUM OF ALL RESPONSES TO PHQ QUESTIONS 1-9: 2
10. IF YOU CHECKED OFF ANY PROBLEMS, HOW DIFFICULT HAVE THESE PROBLEMS MADE IT FOR YOU TO DO YOUR WORK, TAKE CARE OF THINGS AT HOME, OR GET ALONG WITH OTHER PEOPLE: NOT DIFFICULT AT ALL
8. MOVING OR SPEAKING SO SLOWLY THAT OTHER PEOPLE COULD HAVE NOTICED. OR THE OPPOSITE, BEING SO FIGETY OR RESTLESS THAT YOU HAVE BEEN MOVING AROUND A LOT MORE THAN USUAL: NOT AT ALL
2. FEELING DOWN, DEPRESSED OR HOPELESS: SEVERAL DAYS
SUM OF ALL RESPONSES TO PHQ QUESTIONS 1-9: 2
7. TROUBLE CONCENTRATING ON THINGS, SUCH AS READING THE NEWSPAPER OR WATCHING TELEVISION: NOT AT ALL
SUM OF ALL RESPONSES TO PHQ QUESTIONS 1-9: 2

## 2024-10-21 ASSESSMENT — LIFESTYLE VARIABLES
HOW OFTEN DO YOU HAVE A DRINK CONTAINING ALCOHOL: NEVER
HOW MANY STANDARD DRINKS CONTAINING ALCOHOL DO YOU HAVE ON A TYPICAL DAY: PATIENT DOES NOT DRINK

## 2024-10-21 NOTE — TELEPHONE ENCOUNTER
Last seen 7/25/2024   Last labs 7/25/2024  Last filled  4/17/2024  Next appointment 10/21/2024     Lab Results   Component Value Date     07/25/2024    K 4.4 07/25/2024     07/25/2024    CO2 27 07/25/2024    BUN 34 (H) 07/25/2024    CREATININE 1.3 07/25/2024    GLUCOSE 102 (H) 07/25/2024    CALCIUM 9.3 07/25/2024    BILITOT 0.3 07/25/2024    ALKPHOS 56 07/25/2024    AST 15 07/25/2024    ALT 6 07/25/2024    LABGLOM 40.7 (L) 07/25/2024    GFRAA >60.0 01/25/2022    AGRATIO 1.1 07/25/2024    GLOB 3.3 07/25/2024

## 2024-10-21 NOTE — PROGRESS NOTES
[facility]  Sharri STACIE Maciel comes in for follow up care.  Falls: Patient had a fall earlier today.  He has poor balance due to the Parkinson's disease.  States that she was off balance when she got off her bed and this resulted in the fall.  No loss of consciousness.  Denies chest pain, shortness of breath or palpitations.  She has had previous history of falls.  She was referred for physical therapy as well as to get evaluated and advised on fall prevention measures.  She went on 2 occasions and then stopped going.  I will place a referral for her to be seen by physical therapy for evaluation of falls, poor balance and a coordination.  Patient will also continue to follow-up with her neurologist.  Mood disorder: Patient has mood disorder with anxiety and depression.  She is on Seroquel and Remeron.  She is stable on these medications.  Continue current treatment plan.  Parkinson's disease: Patient has Parkinson's disease.  Patient is on Sinemet.  This has helped with coordination and tremors.  Continue current treatment plan.  Dyslipidemia: Patient has dyslipidemia.  Patient is on atorvastatin 10 mg daily.  Patient will continue with the current treatment plan.  She will take a diet low in polysaturated fats.  Poor appetite: Patient has poor appetite.  She has used Remeron with good result.  Would like a refill of medication.  Prescription is sent in.  Health maintenance: Patient will get the flu vaccine today.      Past Medical History  Past Medical History:   Diagnosis Date    Hypertension     Parkinson disease (HCC)        Surgical History  Past Surgical History:   Procedure Laterality Date    CATARACT REMOVAL      COLONOSCOPY  2019    HYSTERECTOMY (CERVIX STATUS UNKNOWN)  1990    SMALL INTESTINE SURGERY  2008        Medications  Current Outpatient Medications   Medication Sig Dispense Refill    QUEtiapine (SEROQUEL) 50 MG tablet Take 1 tablet by mouth nightly 90 tablet 1    mirtazapine (REMERON) 15 MG tablet Take 1

## 2024-10-25 NOTE — PROGRESS NOTES
Medicare Annual Wellness Visit    Sharri St is here for Medicare AWV    Assessment & Plan    Diagnosis Orders   1. Medicare annual wellness visit, subsequent        2. Flu vaccine need  Influenza, FLUAD Trivalent, (age 65 y+), IM, Preservative Free, 0.5mL      3. Anorexia  mirtazapine (REMERON) 15 MG tablet      4. Fall, initial encounter  BS - [facility] - [facility]      5. Mood disorder (HCC)        6. Parkinson's disease, unspecified whether dyskinesia present, unspecified whether manifestations fluctuate (HCC)        7. Hyperlipidemia, unspecified hyperlipidemia type          Recommendations for Preventive Services Due: see orders and patient instructions/AVS.  Recommended screening schedule for the next 5-10 years is provided to the patient in written form: see Patient Instructions/AVS.     Return in about 4 months (around 2/21/2025), or if symptoms worsen or fail to improve, for falls, mood d/o, memory difficulty.     Subjective   Sharri St comes in for Medicare wellness exam.      Patient's complete Health Risk Assessment and screening values have been reviewed and are found in Flowsheets. The following problems were reviewed today and where indicated follow up appointments were made and/or referrals ordered.    Positive Risk Factor Screenings with Interventions:    Fall Risk:  Do you feel unsteady or are you worried about falling? : (!) yes  2 or more falls in past year?: (!) yes  Fall with injury in past year?: no     Interventions:    Reviewed medications, home hazards, visual acuity, and co-morbidities that can increase risk for falls  Patient advised to follow-up in this office for further evaluation and treatment    Cognitive:      Words recalled: 2 Words Recalled     Total Score Interpretation: Abnormal Mini-Cog  Interventions:  Patient advised to follow-up in this office for further evaluation and treatment            Inactivity:  On average, how many days

## 2024-11-01 ENCOUNTER — TELEPHONE (OUTPATIENT)
Facility: HOSPITAL | Age: 79
End: 2024-11-01

## 2024-11-01 NOTE — TELEPHONE ENCOUNTER
Patient req to cancel appt. Patient's daughter states the patient has a conflicting appt. Will call Monday, 11/4 to reschedule. Will hold chart untill that date.

## 2024-11-13 ENCOUNTER — HOSPITAL ENCOUNTER (OUTPATIENT)
Facility: HOSPITAL | Age: 79
Setting detail: RECURRING SERIES
Discharge: HOME | End: 2024-11-16
Payer: MEDICARE

## 2024-11-13 PROCEDURE — 97110 THERAPEUTIC EXERCISES: CPT | Performed by: PHYSICAL THERAPIST

## 2024-11-13 PROCEDURE — 97162 PT EVAL MOD COMPLEX 30 MIN: CPT | Performed by: PHYSICAL THERAPIST

## 2024-11-13 PROCEDURE — 97535 SELF CARE MNGMENT TRAINING: CPT | Performed by: PHYSICAL THERAPIST

## 2024-11-13 PROCEDURE — 97116 GAIT TRAINING THERAPY: CPT | Performed by: PHYSICAL THERAPIST

## 2024-11-13 NOTE — THERAPY EVALUATION
[facility]  [address] [address] Ph: [phone] Fx: [phone]  Plan of Care / Statement of Necessity for Physical Therapy Services     Patient Name: Sharri St : 1945   Medical   Diagnosis: Poor balance [R26.89]  Treatment Diagnosis:   R26.89  Abnormalities of gait and mobility    Onset Date: 10/23/2024     Referral Source: Dari Diaz MD Start of Care (SOC): 2024   Prior Hospitalization: See medical history Provider #: [id number]   Prior Level of Function: Sedentary, has Parkinson's Disease   Comorbidities:  Neurologic condition and Social determinants of health: Physical activity, osteoporosis, Arthritis.      Assessment / key information:  Patient with signs and symptoms consistent with gait and balance dysfunction.  She has had at least 4 falls, the latest was 3 weeks ago.  She has 4/5 LE strength on MMT.  She has good PROM in her LE.  Dynamic Gait Index is 5/24 indicating a high probability of falling.  Her gait is with step to or shuffle with use of SPC.  She at times stops due to her right leg not moving with her.  Functionally, she is not doing anything and is fairly sedentary.      Patient will benefit from a program of skilled physical therapy to include therapeutic exercises to address strength deficits, therapeutic activities to improve functional mobility, neuromuscular reeducation to address balance, coordination and proprioception, manual therapy to address ROM and tissue extensibility and modalities as indicated.  All questions were answered.    Not post operative    Evaluation Complexity:  History:  MEDIUM  Complexity : 1-2 comorbidities / personal factors will impact the outcome/ POC ; Examination:  HIGH Complexity : 4+ Standardized tests and measures addressing body structure, function, activity limitation and / or participation in recreation  ;Presentation:  HIGH Complexity : Unstable and unpredictable

## 2024-11-13 NOTE — PROGRESS NOTES
T DAILY TREATMENT NOTE/EVAL     Patient Name: Sharri St    Date: 2024    : 1945  Insurance: Payor: MEDICARE / Plan: MEDICARE PART A AND B / Product Type: *No Product type* /      Patient  verified yes     Visit #   Current / Total 1 16   Time   In / Out 2:18 3:20   Pain   In / Out 0 0   Subjective Functional Status/Changes: Patient comes to therapy with her daughter.  She has a h/o falls, at least 4 this year, latest one was 3 weeks ago.     Treatment Area: Poor balance [R26.89]    SUBJECTIVE  Pain Level (0-10 scale): 0/10 now; 0/10 at best; 6/10 at worst.  []constant [x]intermittent []improving []worsening [x]no change since onset    Subjective functional status/changes:     PLOF: Parkinson's Disease has limited her activity level.  Limitations to PLOF: Sometimes when walking she will have to stop and is unable to move.  Mechanism of Injury: Patient has had at least 3-4 falls, the latest about 3 weeks ago.  Current symptoms/Complaints: Patient with a shuffling gait, uses a cane for assistance.  She has fallen at least 4 times this year, her daughter states more than 4 times this year.  Previous Treatment/Compliance: PT in .  PMHx/Surgical Hx: HTN, Parkinson's Disease  Work Hx: Retired.  Living Situation: Single level living.  Pt Goals: \"Get some of this stiffness out of my body\"  Barriers: [x]pain []financial []time []transportation []other  Cognition: A & O x 3    Other:    OBJECTIVE/EXAMINATION  Domestic Life: Lives with her son.    Activity/Recreational Limitations: Limited mobility affecting all activity.  Mobility: Step to gait with SPC  Self Care: Independent, some assistance needed at times.    24 min [x]Eval - untimed                      Therapeutic Procedures:  Tx Min Billable or 1:1 Min (if diff from Tx Min) Procedure, Rationale, Specifics   15  50113 Therapeutic Exercise (timed):  increase ROM, strength, coordination, balance, and proprioception to improve patient's

## 2024-11-19 ENCOUNTER — HOSPITAL ENCOUNTER (OUTPATIENT)
Facility: HOSPITAL | Age: 79
Setting detail: RECURRING SERIES
Discharge: HOME | End: 2024-11-22
Payer: MEDICARE

## 2024-11-19 PROCEDURE — 97112 NEUROMUSCULAR REEDUCATION: CPT

## 2024-11-19 PROCEDURE — 97110 THERAPEUTIC EXERCISES: CPT

## 2024-11-19 NOTE — PROGRESS NOTES
:  []  Other:    If an interpreting service was utilized for treatment of this patient, the contents of this document represent the material reviewed with the patient via the .     Carol Murray, PT    11/19/2024    3:47 PM    Future Appointments   Date Time Provider Department Center   11/25/2024  4:20 PM Eugenie Fish, PTA [facility]   11/27/2024 11:30 AM Lily Maki MD [facility] NEPH BS [facility]   12/9/2024  3:15 PM Kyle Salazar MD [facility] BS [facility]   2/12/2025  9:00 AM Nella Mcgregor MD [facility]   2/20/2025 11:15 AM Dari Diaz MD [facility] DEP   10/22/2025 10:30 AM Dari Diaz MD [facility] DEP

## 2024-11-25 ENCOUNTER — HOSPITAL ENCOUNTER (OUTPATIENT)
Facility: HOSPITAL | Age: 79
Setting detail: RECURRING SERIES
Discharge: HOME | End: 2024-11-28
Payer: MEDICARE

## 2024-11-25 PROCEDURE — 97110 THERAPEUTIC EXERCISES: CPT

## 2024-11-25 PROCEDURE — 97112 NEUROMUSCULAR REEDUCATION: CPT

## 2024-11-25 NOTE — PROGRESS NOTES
Hepatitis lab results are negative. PHYSICAL / OCCUPATIONAL THERAPY - DAILY TREATMENT NOTE (updated )    Patient Name: Sharri St    Date: 2024    : 1945  Insurance: Payor: MEDICARE / Plan: MEDICARE PART A AND B / Product Type: *No Product type* /      Patient  verified Yes     Visit #   Current / Total 3 16   Time   In / Out 420 500   Pain   In / Out 0 0   Subjective Functional Status/Changes: Patient reports some increased R hip discomfort yesterday but is unsure of the cause. \"Maybe I laid on it wrong\"      TREATMENT AREA =  Poor balance [R26.89]    OBJECTIVE  Therapeutic Procedures:  Tx Min Billable or 1:1 Min (if diff from Tx Min) Procedure, Rationale, Specifics   15 15 [id number] Therapeutic Exercise (timed):  increase ROM, strength, coordination, balance, and proprioception to improve patient's ability to progress to PLOF and address remaining functional goals. (see flow sheet as applicable)     Details if applicable:       112 Neuromuscular Re-Education (timed):  improve balance, coordination, kinesthetic sense, posture, core stability and proprioception to improve patient's ability to develop conscious control of individual muscles and awareness of position of extremities in order to progress to PLOF and address remaining functional goals. (see flow sheet as applicable)     Details if applicable:     40 40 MC BC Totals Reminder: bill using total billable min of TIMED therapeutic procedures (example: do not include dry needle or estim unattended, both untimed codes, in totals to left)  8-22 min = 1 unit; 23-37 min = 2 units; 38-52 min = 3 units; 53-67 min = 4 units; 68-82 min = 5 units   Total Total       [x]  Patient Education billed concurrently with other procedures   [x] Review HEP    [] Progressed/Changed HEP, detail:    [] Other detail:       Objective Information/Functional Measures/Assessment    Patient demos tight hip flexors during transferring from bike to standing post warmup and requires mod cueing for

## 2024-12-06 ENCOUNTER — HOSPITAL ENCOUNTER (OUTPATIENT)
Facility: HOSPITAL | Age: 79
Setting detail: RECURRING SERIES
Discharge: HOME | End: 2024-12-09
Payer: MEDICARE

## 2024-12-06 PROCEDURE — 97116 GAIT TRAINING THERAPY: CPT

## 2024-12-06 PROCEDURE — 97110 THERAPEUTIC EXERCISES: CPT

## 2024-12-06 PROCEDURE — 97112 NEUROMUSCULAR REEDUCATION: CPT

## 2024-12-06 NOTE — PROGRESS NOTES
PHYSICAL / OCCUPATIONAL THERAPY - DAILY TREATMENT NOTE (updated )    Patient Name: Sharri St    Date: 2024    : 1945  Insurance: Payor: MEDICARE / Plan: MEDICARE PART A AND B / Product Type: *No Product type* /      Patient  verified Yes     Visit #   Current / Total 4 16   Time   In / Out 1 140   Pain   In / Out 0 0   Subjective Functional Status/Changes: Patient amb with narrow GUILLERMINA, noted  stumbling while amb into clinic      TREATMENT AREA =  Poor balance [R26.89]    OBJECTIVE  Therapeutic Procedures:  Tx Min Billable or 1:1 Min (if diff from Tx Min) Procedure, Rationale, Specifics    15 27243 Therapeutic Exercise (timed):  increase ROM, strength, coordination, balance, and proprioception to improve patient's ability to progress to PLOF and address remaining functional goals. (see flow sheet as applicable)     Details if applicable:        10 25848 Gait Training (timed):    gait training with balance and weight shifting therex      15 95268 Neuromuscular Re-Education (timed):  improve balance, coordination, kinesthetic sense, posture, core stability and proprioception to improve patient's ability to develop conscious control of individual muscles and awareness of position of extremities in order to progress to PLOF and address remaining functional goals. (see flow sheet as applicable)     Details if applicable:      40 MC BC Totals Reminder: bill using total billable min of TIMED therapeutic procedures (example: do not include dry needle or estim unattended, both untimed codes, in totals to left)  8-22 min = 1 unit; 23-37 min = 2 units; 38-52 min = 3 units; 53-67 min = 4 units; 68-82 min = 5 units   Total Total       [x]  Patient Education billed concurrently with other procedures   [x] Review HEP    [] Progressed/Changed HEP, detail:    [] Other detail:       Objective Information/Functional Measures/Assessment    Patient demos narrow GUILLERMINA with amb using cane, addressed weight shifting

## 2024-12-11 ENCOUNTER — HOSPITAL ENCOUNTER (OUTPATIENT)
Facility: HOSPITAL | Age: 79
Setting detail: RECURRING SERIES
Discharge: HOME | End: 2024-12-14
Payer: MEDICARE

## 2024-12-11 PROCEDURE — 97112 NEUROMUSCULAR REEDUCATION: CPT

## 2024-12-11 PROCEDURE — 97110 THERAPEUTIC EXERCISES: CPT

## 2024-12-11 PROCEDURE — 97530 THERAPEUTIC ACTIVITIES: CPT

## 2024-12-11 NOTE — PROGRESS NOTES
will be able to perform 5 sit to stands in 30 seconds in order to improve her transfers.  Evaluation:  30 second sit to stand = 0 reps.  Current; progressing: : 5x sit to stand = 16 sec with use of UE on first 2, but poor control. 12/11/24     Next PN/ RC due 12/12/2024 (RC 1/10/2025)  Auth due (visit number/ date) RANDOLPH    PLAN  - Continue Plan of Care    EVELINE RICHARDSON JOSE ALEJANDRO, PTA    12/11/2024    3:03 PM  If an interpreting service was utilized for treatment of this patient, the contents of this document represent the material reviewed with the patient via the .     Future Appointments   Date Time Provider Department Center   1/22/2025 10:15 AM Lily Maki MD [facility] NEPH  AMB   2/12/2025  9:00 AM Nella Mcgregor MD [facility] Sched   2/20/2025 11:15 AM Dari Diaz MD [facility] ECC DEP   10/22/2025 10:30 AM Dari Diaz MD [facility] DEP

## 2024-12-11 NOTE — THERAPY RECERTIFICATION
frequency/duration, no signature required   Reporting Period: (date from last Prog Note/Eval to current Prog Note/Recert)  11/13/2024 - 12/11/2024    RECOMMENDATIONS  Patient would benefit from the continuation of skilled rehab interventions, per initial Plan of Care or most recent Medicare Recert, for functional progress to achieving above stated clinically significant goals.    If you have any questions/comments please contact us directly.  Thank you for allowing us to assist in the care of your patient.    EVELINE BLOUNT, PTA       12/11/2024       3:39 PM

## 2024-12-24 NOTE — TELEPHONE ENCOUNTER
[facility]/Cardiology Progress Note      En Saenz  63 year old  [id number]  Eleni Blum MD  Pcp, Verify      Assessment:     Tachycardia, dyspnea, chest pain--reason for consult. Currently sinus rhythm; acute resp failure -> intubated 12/10/24 extubated          Thoracentesis   1.4 liters removed    There is a component of pleural effusion. No evidence of ACS. Chest pain has been constant.  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  Transfuse for anemia/blood loss.   Echo with Doppler reviewed 24 - vigorous LV fxn EF > 70% (on pressors)  Would recommend Lexiscan nuc stress when able-outpatient if meaningful recovery  Chest pain earlier this admit-may have been referred pain from abdominal pathology. No PE by CTA.  Monitor tele, Reviewed w/ RN at bedside   Lovenox for right internal jugular  DVT      Subjective:    Awake and minimally interactive  Not clearing secretions well   Off pressors       Objective:  Visit Vitals  BP 97/68   Pulse (!) 103   Temp 97 °F (36.1 °C) (Temporal)   Resp 20   Ht 5' 8\" (1.727 m)   Wt 50.4 kg (111 lb 1.8 oz)   SpO2 99%   BMI 16.89 kg/m²       Temp (24hrs), Av.5 °F (36.4 °C), Min:97 °F (36.1 °C), Max:98.2 °F (36.8 °C)      Intake/Output:    Intake/Output Summary (Last 24 hours) at 2024 0709  Last  The pharmacy faxed over the following:    QUEtiapine (SEROQUEL) 50 MG tablet   [facility]. [place], [place].   data filed at 12/24/2024 0500  Gross per 24 hour   Intake 1679.84 ml   Output 1600 ml   Net 79.84 ml       Wt Readings from Last 3 Encounters:   12/24/24 50.4 kg (111 lb 1.8 oz)   11/08/24 49.6 kg (109 lb 5.6 oz)   09/28/24 49.2 kg (108 lb 7.5 oz)       Allergies:  ALLERGIES:  No Known Allergies    Physical Exam:  Blood pressure 97/68, pulse (!) 103, temperature 97 °F (36.1 °C), temperature source Temporal, resp. rate 20, height 5' 8\" (1.727 m), weight 50.4 kg (111 lb 1.8 oz), SpO2 99%.  General: awake, on NC. +NGT.  HEENT: No focal deficits. NG tube   Neck: No JVD, carotids no bruits. R Int J  line   Cardiac:  Regular rate and rhythm, S1, S2 normal, no murmur, rub or gallop. Tachy   Lungs: coarse BS   Abdomen: wound vac  ostomy no sig tenderness  : rodriguez  +hematuria, scrotal edema   Extremities: Without clubbing, cyanosis. 2+ pedal edema/anasarca.  Peripheral pulses present SCD boots  Neurologic: confused but awake  Skin: Warm and dry.   Psych: non combative soft restraints         Lab Results   Component Value Date    PT 12.9 (H) 12/10/2024    PT 11.3 12/07/2024    PT 10.9 12/06/2024    INR 1.2 12/10/2024    INR 1.1 12/07/2024    INR 1.0 12/06/2024     Tele: NSR no malignant rhythms, Sinus tachy.    Imaging:  XR ABDOMEN 1 VIEW   Final Result      As above.      Electronically Signed by: DARLYN MCKEON M.D.    Signed on: 12/23/2024 10:28 AM    Workstation ID: [id number]       VASC UPPER EXTREMITY VENOUS DUPLEX BILATERAL   Final Result   1.   Partially occlusive deep venous thrombus within the right internal   jugular vein which could be recent or chronic.   2.   Acute superficial venous thrombosis/superficial thrombophlebitis of   the left cephalic vein at the AC fossa.            Electronically Signed by: CHIDI PEMBERTON M.D.    Signed on: 12/21/2024 11:21 PM    Workstation ID: [id number]      CTA CHEST PULMONARY EMBOLISM   Final Result   1.   No pulmonary embolism.   2.   Moderate left and small right  pleural effusions with associated   passive atelectasis of the lower lobes.   3.   Moderate emphysema.   4.   At least moderate upper abdominal ascites.   5.   Other findings as above.            Electronically Signed by: CHIDI PEMBERTON M.D.    Signed on: 12/20/2024 9:56 PM    Workstation ID: [id number]      US VASC LOWER EXTREMITY VENOUS DUPLEX BILATERAL   Final Result   1. No definite sonographic evidence of acute DVT within the visualized   lower extremity veins.   2. Apparent diffuse wall thickening in the bilateral lower extremity deep   venous system which could be related to underlying pitting edema.   Suboptimal compression of the lower extremity vasculature which could   related to global pitting edema. Note pulsatility of the venous system   which could reflect underlying heart failure/right heart dysfunction in   this setting.   3. Consider short interval follow-up ultrasound to assess resolution/change   of these findings.      Electronically Signed by: ANDRIY REDMAN MD    Signed on: 12/20/2024 6:45 PM    Workstation ID: [id number]      XR CHEST AP OR PA   Final Result      As above.               Electronically Signed by: KHANH CASTRO M.D.    Signed on: 12/20/2024 4:55 PM    Workstation ID: [id number]      US BLADDER ONLY   Final Result         1. Nondiagnostic evaluation of the bladder. Bladder is decompressed by   Meade catheter. Despite clamping the Meade for 30 minutes, no significant   bladder distention was noted.      2. Ascites within the pelvis with low-level echoes which may represent   hemorrhagic or proteinaceous material.               Electronically Signed by: KELLE RODRIGUEZ M.D.    Signed on: 12/20/2024 12:21 AM    Workstation ID: [id number]      XR CHEST AP OR PA   Final Result      Mild haziness in the lower lobes especially on the right. Increasing   atelectasis, infiltrate or effusion.      Edema. No change.               Electronically Signed by: KHANH CASTRO M.D.     Signed on: 12/18/2024 6:59 AM    Workstation ID: [id number]      XR CHEST AP OR PA   Final Result      Overall, no significant interval change.      Electronically Signed by: DARLYN MCKEON M.D.    Signed on: 12/17/2024 6:52 AM    Workstation ID: [id number]      XR CHEST AP OR PA   Final Result      Overall, no significant interval change.      Electronically Signed by: DARLYN MCKEON M.D.    Signed on: 12/16/2024 7:29 AM    Workstation ID: [id number]      CT UROGRAM W WO CONTRAST   Final Result         1. Bladder is relatively decompressed by Meade catheter. High-density   material within the bladder, compatible with blood products. Small foci of   air within the bladder, may be secondary to Meade catheter placement or   recent bladder injury with repair per clinical history.      2. Kidneys enhance and excrete contrast symmetrically without   hydronephrosis. Atrophy lower pole right kidney.      3. Small amount of free fluid within the abdomen and pelvis. Small foci of   free air also identified as well as small amount of extraperitoneal air in   the pelvis. Findings likely related to recent abdominal surgery.      4. Multiple dilated small bowel loops measuring up to 4.7 cm. Multiple   small bowel loops also demonstrate mural thickening. No pneumatosis is   identified. Findings may be secondary to ileus, although nonspecific   ileitis not excluded.      5. Postsurgical changes from colectomy. The rectum is sutured.      6. Moderate-sized bilateral pleural effusions with adjacent atelectasis or   infiltrate.      7. Diffuse body wall edema.      8. Additional findings as above.         Electronically Signed by: KELLE RODRIGUEZ M.D.    Signed on: 12/15/2024 8:24 PM    Workstation ID: [id number]       VASC LOWER EXTREMITY VENOUS DUPLEX BILATERAL   Final Result   FINDINGS/IMPRESSION:      Limited by portable technique. No evidence for lower extremity DVT.         Electronically Signed by: RIMA SON  M.D.    Signed on: 12/15/2024 2:06 PM    Workstation ID: [id number]      XR ABDOMEN 1 VIEW   Final Result      As above.               Electronically Signed by: KHANH CASTRO M.D.    Signed on: 12/15/2024 8:47 AM    Workstation ID: [id number]      XR CHEST AP OR PA   Final Result      As above.               Electronically Signed by: KHANH CASTRO M.D.    Signed on: 12/15/2024 7:28 AM    Workstation ID: [id number]      XR CHEST AP OR PA   Final Result      1. Endotracheal tube tip is has been advanced. This is satisfactory.   2. Stable lines and tubes.   3. Small bibasilar pleural effusions.   4. Stable small gas density along the left lateral lower chest likely   presenting a small pneumothorax. Follow-up recommended.                  Electronically Signed by: NINA JACKSON    Signed on: 12/14/2024 10:45 AM    Workstation ID: [id number]      XR CHEST AP OR PA   Final Result      1. Questionable small left basilar pneumothorax. Short-term follow-up   recommended.   2. Endotracheal tube and enteric tube positioning as described.   3. Since x-rays from December 12, 2024, there has been interval worsening   of fluid overload with bibasilar moderate sized pleural effusions and   associated atelectasis/consolidation. Superimposed infection is not   excluded. Follow-up recommended.      Findings discussed by NINA JACKSON via [employer] with AMAYA PEREZ   on 12/14/2024 8:07 AM.            Electronically Signed by: NINA JACKSON    Signed on: 12/14/2024 8:10 AM    Workstation ID: [id number]      CT ABDOMEN PELVIS W CONTRAST   Final Result      1. Dilated fluid-filled small bowel pattern which may be due to ileus   versus obstruction.   2. Findings suspicious for colitis.   3. Nonspecific gallbladder distention.   4. Moderate volume free fluid, nonspecific. Nonspecific mesenteric edema   and fluid.   5. Subcutaneous edematous thickening along the body wall which can be seen   with anasarca. Subcutaneous  edema, fluid, and swelling of the partially   visualized included scrotum.   6. Additional findings as described above.      Electronically Signed by: DARLYN MCKEON M.D.    Signed on: 12/13/2024 6:35 PM    Workstation ID: [id number]      CT CHEST W CONTRAST   Final Result      1. Secretions within the central airways with new multifocal small airway   opacification is suspicious for aspiration.   2. Moderate bilateral pleural effusions with mild overlying   atelectasis/lung consolidation.   3. Lung emphysema.   4. Lines and tubes as described above.   5. Pulmonary nodules for which follow-up was previously recommended.   6. Additional findings as described above.         Electronically Signed by: DARLYN MCKEON M.D.    Signed on: 12/13/2024 6:24 PM    Workstation ID: [id number]      XR ABDOMEN 1 VIEW   Final Result   Multiple dilated loops of small bowel consistent with ileus or   obstruction, slightly improving since the prior exam.  Continued follow-up   is recommended.      Electronically Signed by: CONSTANCE BENITEZ M.D.    Signed on: 12/12/2024 6:07 PM    Workstation ID: [id number]      XR ABDOMEN 1 VIEW   Final Result      As above.      Electronically Signed by: DARLYN MCKEON M.D.    Signed on: 12/12/2024 3:03 PM    Workstation ID: [id number]      XR CHEST AP OR PA   Final Result      As above.      Electronically Signed by: DARLYN MCKEON M.D.    Signed on: 12/12/2024 6:52 AM    Workstation ID: [id number]      XR CHEST AP OR PA   Final Result   1.  Lines/tubes as above.   2.  Findings suspicious for obstructive airways disease.   3.  Mild perihilar interstitial edema may be exaggerated by portable supine   AP technique.   4.  Linear and hazy densities at the lung bases, right more than left, can   be seen with atelectasis, edema, or aspiration/infection.   5.  Multiple dilated small bowel loops highly suspicious for small bowel   obstruction, and possibly related to tumor given the abnormal  findings on   recent CT abdomen/pelvis.  Small bowel dilation appears very similar   compared to radiograph performed today      Electronically Signed by: CONSTANCE BENITEZ M.D.    Signed on: 12/10/2024 6:44 PM    Workstation ID: [id number]      XR ABDOMEN 1 VIEW   Final Result         1. Multiple dilated small bowel loops which are either new or increased   since the prior CT examination. Findings may be secondary to ileus or small   bowel obstruction. If there is clinical suspicion for bowel obstruction,   this can be evaluated with follow-up CT.               Electronically Signed by: KELLE RODRIGUEZ M.D.    Signed on: 12/10/2024 2:36 PM    Workstation ID: [id number]      XR CHEST AP OR PA   Final Result         1. Low lung volumes.      2. Small right pleural effusion with adjacent right lower lung atelectasis.      3. Multiple dilated small bowel loops partially visualized in the upper   abdomen. If there is suspicion for small bowel obstruction, this can be   evaluated with follow-up CT.      Electronically Signed by: KELLE RODRIGUEZ M.D.    Signed on: 12/10/2024 2:35 PM    Workstation ID: [id number]      CTA CHEST PULMONARY EMBOLISM   Final Result   1.  No pulmonary embolism to the proximal subsegmental level.   2.  Mild centrilobular emphysema.   3.  Secretions in the airways from aspiration or infection.   4.  Moderate sized hiatal hernia and esophageal wall thickening compatible   with esophagitis, with fluid in the esophagus placing the patient at risk   for aspiration.   5.  Small bilateral pleural effusions with adjacent probable atelectasis   are new since the prior exam.   6.  A few pulmonary nodules are likely infectious or inflammatory, and   follow-up chest CT scan with suggested interval of 1 year was recommended   on prior CT.   7.  Please see above for additional observations.       Electronically Signed by: CONSTANCE BENITEZ M.D.    Signed on: 12/9/2024 1:19 AM    Workstation ID:  [id number]      CT ABDOMEN PELVIS WO CONTRAST   Final Result   1.  Marked distention of the bladder with small amount of adjacent fat   stranding which can be seen with cystitis.   2.  Moderate bilateral hydroureteronephrosis likely related to the   distended bladder.   3.  Mass-like thickening of the distal descending/proximal sigmoid colon   with adjacent fat stranding.  While this can be seen with colitis,   correlation with colonoscopy is recommended, as this is suspicious for   tumor.   4.  Abnormal density extends anteroinferiorly at the left lower abdominal   cavity.  This is proximal to the inguinal canal and could be due to   infection, scarring, inflammation, or tumor.  Further assessment with   contrast-enhanced imaging is recommended.   5.  Nondilated small bowel loops with air-fluid levels and areas of wall   thickening can be seen with enteritis.   6.  Postsurgical changes of recent intramedullary maribell fixation of the right   intertrochanteric femur fracture with heterogeneous attenuation of the   lesser trochanter.  Close radiographic follow-up is recommended to ensure   stability.   7.  Please see above for additional observations.      Electronically Signed by: CONSTANCE BENITEZ M.D.    Signed on: 12/6/2024 8:46 PM    Workstation ID: [id number]       VASC LOWER EXTREMITY VENOUS DUPLEX RIGHT   Final Result   FINDINGS/IMPRESSION:         No evidence for right lower extremity DVT.      Electronically Signed by: IRMA SON M.D.    Signed on: 12/6/2024 7:25 PM    Workstation ID: [id number]      XR HIP 2 VIEWS RIGHT AND PELVIS   Final Result   1.  Bony demineralization.   2.  Postsurgical changes of intramedullary fixation of the right   intertrochanteric femur.   3.  Lucent/lytic appearance of the right lesser trochanter and   subtrochanteric femur suspicious for tumor.      Electronically Signed by: CONSTANCE BENITEZ M.D.    Signed on: 12/6/2024 6:24 PM    Workstation ID: [id number]       XR CHEST PA AND LATERAL 2 VIEWS    (Results Pending)          Medications:  Current Facility-Administered Medications   Medication    magnesium sulfate 2 g in 50 mL premix IVPB    dextrose 10 % infusion    loperamide (IMODIUM) capsule 2 mg    ipratropium-albuterol (DUONEB) 0.5-2.5 (3) MG/3ML nebulizer solution 3 mL    dextrose 5 % / sodium chloride 0.45% infusion    dexMEDEtomidine (PRECEDEX) 400 mcg/100 mL in sodium chloride 0.9 % infusion    Potassium Standard Replacement Protocol (Levels 3.5 and lower)    Potassium Replacement (Levels 3.6 - 4)    enoxaparin (LOVENOX) injection 60 mg    furosemide (LASIX INJECT) injection 20 mg    cyclobenzaprine (FLEXERIL) tablet 5 mg    dextrose (GLUTOSE) 40 % gel 15 g    dextrose (GLUTOSE) 40 % gel 30 g    midodrine (PROAMATINE) tablet 10 mg    meropenem (MERREM) 500 mg in sodium chloride 0.9 % 100 mL IVPB    sodium chloride 0.9 % injection 10 mL    Magnesium Standard Replacement Protocol    acetaminophen (TYLENOL) tablet 1,000 mg    oxyCODONE (IMM REL) (ROXICODONE) tablet 5 mg    ipratropium-albuterol (DUONEB) 0.5-2.5 (3) MG/3ML nebulizer solution 3 mL    vancomycin (FIRVANQ) 250 MG/5ML solution 500 mg    sodium chloride 0.9 % injection 10 mL    sodium chloride 0.9 % injection 10 mL    sodium chloride 0.9 % injection 10 mL    sodium chloride 0.9 % injection 10 mL    sodium chloride 0.9 % injection 20 mL    dextrose 50 % injection 25 g    dextrose 50 % injection 12.5 g    glucagon (GLUCAGEN) injection 1 mg    insulin lispro (ADMELOG,HumaLOG) - Correction Dose    Phosphorus Standard Replacement Protocol    [Held by provider] ferrous sulfate 300 (60 Fe) MG/5ML liquid 300 mg    folic acid (FOLATE) tablet 1 mg    LORazepam (ATIVAN) tablet 1 mg    NORepinephrine (LEVOPHED) 8 mg/250 mL in dextrose 5 % infusion    pantoprazole (PROTONIX INJECT) injection 40 mg    ondansetron (ZOFRAN) injection 4 mg    sodium chloride 0.9 % injection 10 mL    sodium chloride 0.9 % injection 2 mL     nicotine (NICODERM) 21 MG/24HR patch 1 patch    lidocaine 2% urethral (UROJET) 2 % jelly 10 mL       Prior to Admission medications    Medication Sig Start Date End Date Taking? Authorizing Provider   acetaminophen (TYLENOL) 500 MG tablet Take 500 mg by mouth every 6 hours as needed for Pain.   Yes Provider, Outside   famotidine (PEPCID) 20 MG tablet Take 20 mg by mouth daily as needed (heartburn, indigestion).   Yes Provider, Outside   fluticasone-salmeterol 100-50 MCG/ACT inhaler Inhale 1 puff into the lungs in the morning and 1 puff in the evening.   Yes Provider, Outside   docusate sodium, DSS, 100 MG Cap Take 100 mg by mouth in the morning and 100 mg in the evening.   Yes Provider, Outside   cefadroxil (DURICEF) 500 MG capsule Take 500 mg by mouth in the morning and 500 mg in the evening. Until 12/13/24 (10 days) 12/3/24 12/13/24 Yes Provider, Outside   QUEtiapine (SEROquel) 25 MG tablet Take 25 mg by mouth at bedtime.   Yes Provider, Outside   LORazepam (ATIVAN) 1 MG tablet Take 1 mg by mouth every 8 hours as needed for Anxiety. 12/2/24 12/13/24 Yes Provider, Outside   ferrous sulfate 325 (65 FE) MG tablet Take 1 tablet by mouth daily (with breakfast).  Patient taking differently: Take 325 mg by mouth in the morning and 325 mg in the evening. 11/17/24  Yes Eleni Blum MD   ipratropium-albuterol (DUONEB) 0.5-2.5 (3) MG/3ML nebulizer solution Take 3 mLs by nebulization Every 4 hours as needed for Shortness of Breath or Wheezing. 11/16/24  Yes Eleni Blum MD   nicotine (NICODERM) 21 MG/24HR patch Place 1 patch onto the skin daily. 11/17/24  Yes Eleni Blum MD   traMADol (ULTRAM) 50 MG tablet Take 1 tablet by mouth every 4 hours as needed for Pain. 11/16/24  Yes Eleni Blum MD   folic acid (FOLATE) 1 MG tablet Take 1 tablet by mouth daily. Do not start before August 28, 2020. 8/28/20  Yes Abel Montano MD   Multiple Vitamins-Minerals (vitamin - therapeutic multivitamins w/minerals) tablet Take 1 tablet by  mouth daily. Do not start before August 28, 2020. 8/28/20  Yes Abel Montano MD        LABS  CBC  Recent Labs   Lab 12/24/24 0418 12/23/24  0526 12/22/24  0428 12/21/24  0421 12/20/24  0411 12/10/24  1238 12/10/24  0616 12/08/24  1527   WBC 13.5* 15.1* 19.4*   < > 17.4*   < > 20.1* 28.4*   RBC 2.82* 2.40* 2.57*   < > 2.78*   < > 2.76* 2.51*   HGB 9.1* 7.7* 8.3*   < > 8.9*   < > 8.9* 8.8*   HCT 28.3* 23.8* 25.7*   < > 25.9*   < > 28.1* 25.6*   .4* 99.2 100.0   < > 93.2   < > 101.8* 102.0*   MCH 32.3 32.1 32.3   < > 32.0   < > 32.2 35.1*   MCHC 32.2 32.4 32.3   < > 34.4   < > 31.7* 34.4   RDW-CV 22.4* 24.1* 23.8*   < > 21.2*   < > 18.0* 18.5*    218 187   < > 146   < > 393 405   Lymphocytes, Percent  --   --   --   --  6  --  4 4    < > = values in this interval not displayed.     CMP  Recent Labs   Lab 12/24/24 0418 12/23/24  0526 12/22/24  0428 12/21/24  1207 12/21/24  0421   Sodium 140 140 139  --  139   Chloride 116* 113* 115*  --  116*   BUN 21* 27* 23*  --  19   Potassium 4.5 4.0 4.3 4.5 3.4   Glucose 79 103* 105*  --  99   Creatinine 0.53* 0.60* 0.68  --  0.66*   Calcium 8.1* 8.3* 7.8*  --  7.4*     No results for input(s): \"RAPDTR\", \"NTPROB\" in the last 72 hours.    No results found     Triglycerides (mg/dL)   Date Value   12/16/2024 130       IMAGING STUDIES    LAST EKG:    Encounter Date: 12/06/24   Electrocardiogram 12-Lead   Result Value    Ventricular Rate EKG/Min (BPM) 98    Atrial Rate (BPM) 78    IN-Interval (MSEC) 110    QRS-Interval (MSEC) 66    QT-Interval (MSEC) 456    QTc 582    P Axis (Degrees) 17    R Axis (Degrees) 10    T Axis (Degrees) 103    REPORT TEXT       **Poor data quality, interpretation may be adversely affected**  Sinus rhythm  with short IN  with frequent  and consecutive  premature ventricular complexes  with junctional escape complexes  Right atrial enlargement  Low voltage QRS  Anterolateral infarct  (cited on or before  08-NOV-2024)  Prolonged QT  ** **  ACUTE MI / STEMI ** **  Abnormal ECG  When compared with ECG of  09-DEC-2024 09:22,  Significant changes have occurred  Confirmed by FAB YEE MD ([id number]) on 12/15/2024 2:57:12 PM         LAST ECHO/ECHO STRESS:  Results for orders placed during the hospital encounter of 24    TRANSTHORACIC ECHO (TTE) COMPLETE W/ W/O IMAGING AGENT    Impression  *[facility]*  [address] [place] [id number]  [phone]  Transthoracic Echocardiogram (TTE)    Patient: En Saenz    Study Date/Time:         2024 8:23AM  MRN:     [id number]         FIN#:                    [id number]  :     1961       Ht/Wt:                   172cm 49.4kg  Age:     63               BSA/BMI:                 1.52m^2 16.7kg/m^2  Gender:  M                Baseline BP:             112 / 71  *Ordering Physician:* Wes Holder MD    *Referring Physician:* Wes Holder MD    *Attending Physician:* Eleni BlumPerforming Physician:* AMNAEL  *Diagnostic Physician:* Wes Holder MD  *Sonographer:* Teodoro Ellis,    ------------------------------------------------------------------------------  INDICATIONS:   Syncope.    ------------------------------------------------------------------------------  STUDY CONCLUSIONS  SUMMARY:    1.  Procedure narrative: A transthoracic echocardiogram was performed. Image  quality was suboptimal. The study was technically limited due to poor  acoustic window availability and restricted patient mobility. Intravenous  contrast (Definity 2ml) was administered to opacify the left ventricle.  2.  Left ventricle: The cavity size is normal. Wall thickness is at the upper  limits of normal. Systolic function is normal. The ejection fraction was  measured by visual estimation. Doppler parameters are consistent with  abnormal left ventricular relaxation (grade 1 diastolic dysfunction). The  ejection fraction is 60%.  3.  Aortic valve: The annulus is normal. The valve is  probably trileaflet. The  leaflets are mildly thickened and mildly calcified. Velocity is within the  normal range. There is no stenosis.  4.  Mitral valve: The annulus is normal. The leaflets are mildly thickened and  mildly calcified.  5.  Left atrium: The atrium is normal in size.  6.  Right ventricle: The cavity size is normal. Wall thickness is normal.  Systolic function is normal.  7.  Right atrium: The atrium is normal in size. The estimated central venous  pressure is 3mm Hg.  8.  Inferior vena cava: The IVC is normal-sized. Respirophasic diameter  changes are in the normal range (>= 50%).  9.  Pericardium, extracardiac: A small pericardial effusion is identified.  10. Baseline ECG: Sinus tachycardia.    ------------------------------------------------------------------------------  STUDY DATA:  [place] There is no prior study available for comparison at  this time.  Procedure:  A transthoracic echocardiogram was performed. Image  quality was suboptimal. The study was technically limited due to poor acoustic  window availability and restricted patient mobility. Intravenous contrast  (Definity 2ml) was administered to opacify the left ventricle.  M-mode,  complete 2D, complete spectral Doppler, color Doppler, and intravenous  contrast injection.  Study status:  Routine.  Study completion:  There were no  complications.    FINDINGS    BASELINE ECG:   Sinus tachycardia.  LEFT VENTRICLE:  The cavity size is normal. Wall thickness is at the upper  limits of normal. Systolic function is normal. Regional wall motion  abnormalities cannot be excluded.    The ejection fraction was measured by  visual estimation. The ejection fraction is 60%. Doppler parameters are  consistent with abnormal left ventricular relaxation (grade 1 diastolic  dysfunction).    AORTIC VALVE:  Poorly visualized. The annulus is normal. The valve is probably  trileaflet. The leaflets are mildly thickened and mildly calcified.  Cusp  separation is normal. Velocity is within the normal range. There is no  stenosis. There is no regurgitation. The mean systolic gradient is 3mm Hg. The  peak systolic gradient is 5mm Hg. The LVOT to aortic valve VTI ratio is 0.8.  The valve area is 2.5cm^2. The valve area index is 1.66cm^2/m^2. The ratio of  LVOT to aortic valve peak velocity is 0.81. The valve area is 2.5cm^2. The  valve area index is 1.67cm^2/m^2.    AORTA:  Aortic root: The root is normal-sized.    MITRAL VALVE:  Poorly visualized. The annulus is normal. The leaflets are  mildly thickened and mildly calcified. Leaflet separation is normal. Inflow  velocity is within the normal range. There is no evidence for stenosis. There  is no regurgitation. The valve area by pressure half-time is 5.2cm^2. The  valve area index by pressure half-time is 3.44cm^2/m^2.    ATRIAL SEPTUM:   Color doppler shows no obvious shunt.    LEFT ATRIUM:  Poorly visualized. The atrium is normal in size.    RIGHT VENTRICLE:  Poorly visualized. The cavity size is normal. Wall thickness  is normal. Systolic function is normal. The TAPSE is normal, suggestive of  normal RV systolic function.    PULMONIC VALVE:   Poorly visualized. The valve is structurally normal. Cusp  separation is normal. Velocity is within the normal range. There is trivial  regurgitation.    TRICUSPID VALVE:  Poorly visualized. The valve is structurally normal. Leaflet  separation is normal. Inflow velocity is within the normal range. There is  trivial regurgitation.    RIGHT ATRIUM:  Poorly visualized. The atrium is normal in size.       The  estimated central venous pressure is 3mm Hg.    PERICARDIUM:  A small pericardial effusion is identified.    SYSTEMIC VEINS:  Inferior vena cava: The IVC is normal-sized.  Respirophasic diameter changes  are in the normal range (>= 50%).    ------------------------------------------------------------------------------  Measurements    Left ventricle         Value         Ref         Left atrium                Value          Ref  KRISTYN, LAX chord     (L) 3.1   cm     4.2 - 5.8   AP dim, ES             (L) 2.3   cm       3.0 - 4.0  ESD, LAX chord     (L) 2.1   cm     2.5 - 4.0   AP dim index, ES       (N) 1.5   cm/m^2   1.5 - 2.3  KRISTYN/bsa, LAX chord (L) 2.0   cm/m^2 2.2 - 3.0  ESD/bsa, LAX chord (N) 1.4   cm/m^2 1.3 - 2.1   Aortic valve               Value          Ref  PW, ED, LAX        (N) 0.8   cm     0.6 - 1.0   Peak v, S                  1.1   m/sec    ---------  IVS, ED            (N) 0.7   cm     0.6 - 1.0   Mean v, S                  0.87  m/sec    ---------  PW, ED             (N) 0.8   cm     0.6 - 1.0   Mean grad, S               3     mm Hg    ---------  IVS/PW, ED             0.94         ----------  Peak grad, S               5     mm Hg    ---------  EDV                (L) 29    ml     62 - 150    LVOT/AV, VTI ratio         0.8            ---------  ESV                (L) 9     ml     21 - 61     CANDIDO, VTI                   2.5   cm^2     ---------  EF                 (N) 60    %      52 - 72     CANDIDO/bsa, VTI               1.66  cm^2/m^2 ---------  SV                     23    ml     ----------  LVOT/AV, Vpeak ratio       0.81           ---------  EDV/bsa            (L) 19    ml/m^2 34 - 74     CANDIDO, Vmax                  2.5   cm^2     ---------  ESV/bsa            (L) 6     ml/m^2 11 - 31     CANDIDO/bsa, Vmax              1.67  cm^2/m^2 ---------  SV/bsa                 15    ml/m^2 ----------  E', lat maurilio, TDI   (L) 3.7   cm/sec >=10.0      Mitral valve               Value          Ref  E/e', lat maurilio, TDI (N) 13           <=13        Peak E                     0.5   m/sec    ---------  E', med maurilio, TDI   (L) 3.1   cm/sec >=7.0       Peak A                     0.59  m/sec    ---------  E/e', med maurilio, TDI     16           ----------  Decel time                 142   ms       ---------  E', avg, TDI           3.375 cm/sec ----------  PHT                        42    ms        ---------  E/e', avg, TDI     (H) 15           <=14        Peak E/A ratio             0.8            ---------  MVA, PHT                   5.2   cm^2     ---------  LVOT                   Value        Ref         MVA/bsa, PHT               3.44  cm^2/m^2 ---------  Diam, S                2.0   cm     ----------  Area                   3.1   cm^2   ----------  Aortic root                Value          Ref  Peak crystal, S            0.91  m/sec  ----------  S-T junct diam, ED     (L) 2.0   cm       2.3 - 3.5  Peak grad, S           3     mm Hg  ----------  S-T junct diam/bsa, ED (N) 1.3   cm/m^2   1.1 - 1.9    Right ventricle        Value        Ref         Systemic veins             Value          Ref  TAPSE, MM          (N) 2.6   cm     >=1.7       Estimated CVP              3     mm Hg    ---------  S' lateral         (N) 12.1  cm/sec 6.0 - 13.4    Legend:  (L)  and  (H)  juana values outside specified reference range.    (N)  marks values inside specified reference range.    Prepared and electronically signed by  Wes Holder MD  11/09/2024 10:49      CATH REPORT:  No results found for this or any previous visit.    STRESS TEST:   No results found for this or any previous visit.    XR ABDOMEN 1 VIEW   Final Result      As above.      Electronically Signed by: DARLYN MCKEON M.D.    Signed on: 12/23/2024 10:28 AM    Workstation ID: [id number]       VASC UPPER EXTREMITY VENOUS DUPLEX BILATERAL   Final Result   1.   Partially occlusive deep venous thrombus within the right internal   jugular vein which could be recent or chronic.   2.   Acute superficial venous thrombosis/superficial thrombophlebitis of   the left cephalic vein at the AC fossa.            Electronically Signed by: CHIDI PEMBERTON M.D.    Signed on: 12/21/2024 11:21 PM    Workstation ID: [id number]      CTA CHEST PULMONARY EMBOLISM   Final Result   1.   No pulmonary embolism.   2.   Moderate left and small right pleural effusions with  associated   passive atelectasis of the lower lobes.   3.   Moderate emphysema.   4.   At least moderate upper abdominal ascites.   5.   Other findings as above.            Electronically Signed by: CHIDI PEMBERTON M.D.    Signed on: 12/20/2024 9:56 PM    Workstation ID: [id number]      US VASC LOWER EXTREMITY VENOUS DUPLEX BILATERAL   Final Result   1. No definite sonographic evidence of acute DVT within the visualized   lower extremity veins.   2. Apparent diffuse wall thickening in the bilateral lower extremity deep   venous system which could be related to underlying pitting edema.   Suboptimal compression of the lower extremity vasculature which could   related to global pitting edema. Note pulsatility of the venous system   which could reflect underlying heart failure/right heart dysfunction in   this setting.   3. Consider short interval follow-up ultrasound to assess resolution/change   of these findings.      Electronically Signed by: ANDRIY REDMAN MD    Signed on: 12/20/2024 6:45 PM    Workstation ID: [id number]      XR CHEST AP OR PA   Final Result      As above.               Electronically Signed by: KHANH CASTRO M.D.    Signed on: 12/20/2024 4:55 PM    Workstation ID: [id number]      US BLADDER ONLY   Final Result         1. Nondiagnostic evaluation of the bladder. Bladder is decompressed by   Meade catheter. Despite clamping the Meade for 30 minutes, no significant   bladder distention was noted.      2. Ascites within the pelvis with low-level echoes which may represent   hemorrhagic or proteinaceous material.               Electronically Signed by: KELLE RODRIGUEZ M.D.    Signed on: 12/20/2024 12:21 AM    Workstation ID: [id number]      XR CHEST AP OR PA   Final Result      Mild haziness in the lower lobes especially on the right. Increasing   atelectasis, infiltrate or effusion.      Edema. No change.               Electronically Signed by: KHANH CASTRO M.D.    Signed on:  12/18/2024 6:59 AM    Workstation ID: [id number]      XR CHEST AP OR PA   Final Result      Overall, no significant interval change.      Electronically Signed by: DARLYN MCKEON M.D.    Signed on: 12/17/2024 6:52 AM    Workstation ID: [id number]      XR CHEST AP OR PA   Final Result      Overall, no significant interval change.      Electronically Signed by: DARLYN MCKEON M.D.    Signed on: 12/16/2024 7:29 AM    Workstation ID: [id number]      CT UROGRAM W WO CONTRAST   Final Result         1. Bladder is relatively decompressed by Meade catheter. High-density   material within the bladder, compatible with blood products. Small foci of   air within the bladder, may be secondary to Meade catheter placement or   recent bladder injury with repair per clinical history.      2. Kidneys enhance and excrete contrast symmetrically without   hydronephrosis. Atrophy lower pole right kidney.      3. Small amount of free fluid within the abdomen and pelvis. Small foci of   free air also identified as well as small amount of extraperitoneal air in   the pelvis. Findings likely related to recent abdominal surgery.      4. Multiple dilated small bowel loops measuring up to 4.7 cm. Multiple   small bowel loops also demonstrate mural thickening. No pneumatosis is   identified. Findings may be secondary to ileus, although nonspecific   ileitis not excluded.      5. Postsurgical changes from colectomy. The rectum is sutured.      6. Moderate-sized bilateral pleural effusions with adjacent atelectasis or   infiltrate.      7. Diffuse body wall edema.      8. Additional findings as above.         Electronically Signed by: KELLE RODRIGUEZ M.D.    Signed on: 12/15/2024 8:24 PM    Workstation ID: [id number]       VASC LOWER EXTREMITY VENOUS DUPLEX BILATERAL   Final Result   FINDINGS/IMPRESSION:      Limited by portable technique. No evidence for lower extremity DVT.         Electronically Signed by: IRMA SON M.D.    Signed  on: 12/15/2024 2:06 PM    Workstation ID: [id number]      XR ABDOMEN 1 VIEW   Final Result      As above.               Electronically Signed by: KHANH CASTRO M.D.    Signed on: 12/15/2024 8:47 AM    Workstation ID: [id number]      XR CHEST AP OR PA   Final Result      As above.               Electronically Signed by: KHANH CASTRO M.D.    Signed on: 12/15/2024 7:28 AM    Workstation ID: [id number]      XR CHEST AP OR PA   Final Result      1. Endotracheal tube tip is has been advanced. This is satisfactory.   2. Stable lines and tubes.   3. Small bibasilar pleural effusions.   4. Stable small gas density along the left lateral lower chest likely   presenting a small pneumothorax. Follow-up recommended.                  Electronically Signed by: NINA JACKSON    Signed on: 12/14/2024 10:45 AM    Workstation ID: [id number]      XR CHEST AP OR PA   Final Result      1. Questionable small left basilar pneumothorax. Short-term follow-up   recommended.   2. Endotracheal tube and enteric tube positioning as described.   3. Since x-rays from December 12, 2024, there has been interval worsening   of fluid overload with bibasilar moderate sized pleural effusions and   associated atelectasis/consolidation. Superimposed infection is not   excluded. Follow-up recommended.      Findings discussed by NINA JACKSON via [employer] with AMAYALINSEY PEREZ   on 12/14/2024 8:07 AM.            Electronically Signed by: NINA JACKSON    Signed on: 12/14/2024 8:10 AM    Workstation ID: [id number]      CT ABDOMEN PELVIS W CONTRAST   Final Result      1. Dilated fluid-filled small bowel pattern which may be due to ileus   versus obstruction.   2. Findings suspicious for colitis.   3. Nonspecific gallbladder distention.   4. Moderate volume free fluid, nonspecific. Nonspecific mesenteric edema   and fluid.   5. Subcutaneous edematous thickening along the body wall which can be seen   with anasarca. Subcutaneous edema, fluid,  and swelling of the partially   visualized included scrotum.   6. Additional findings as described above.      Electronically Signed by: DARLYN MCKEON M.D.    Signed on: 12/13/2024 6:35 PM    Workstation ID: [id number]      CT CHEST W CONTRAST   Final Result      1. Secretions within the central airways with new multifocal small airway   opacification is suspicious for aspiration.   2. Moderate bilateral pleural effusions with mild overlying   atelectasis/lung consolidation.   3. Lung emphysema.   4. Lines and tubes as described above.   5. Pulmonary nodules for which follow-up was previously recommended.   6. Additional findings as described above.         Electronically Signed by: DARLYN MCKEON M.D.    Signed on: 12/13/2024 6:24 PM    Workstation ID: [id number]      XR ABDOMEN 1 VIEW   Final Result   Multiple dilated loops of small bowel consistent with ileus or   obstruction, slightly improving since the prior exam.  Continued follow-up   is recommended.      Electronically Signed by: CONSTANCE BENITEZ M.D.    Signed on: 12/12/2024 6:07 PM    Workstation ID: [id number]      XR ABDOMEN 1 VIEW   Final Result      As above.      Electronically Signed by: DARLYN MCKEON M.D.    Signed on: 12/12/2024 3:03 PM    Workstation ID: [id number]      XR CHEST AP OR PA   Final Result      As above.      Electronically Signed by: DARLYN MCKEON M.D.    Signed on: 12/12/2024 6:52 AM    Workstation ID: [id number]      XR CHEST AP OR PA   Final Result   1.  Lines/tubes as above.   2.  Findings suspicious for obstructive airways disease.   3.  Mild perihilar interstitial edema may be exaggerated by portable supine   AP technique.   4.  Linear and hazy densities at the lung bases, right more than left, can   be seen with atelectasis, edema, or aspiration/infection.   5.  Multiple dilated small bowel loops highly suspicious for small bowel   obstruction, and possibly related to tumor given the abnormal findings on    recent CT abdomen/pelvis.  Small bowel dilation appears very similar   compared to radiograph performed today      Electronically Signed by: CONSTANCE BENITEZ M.D.    Signed on: 12/10/2024 6:44 PM    Workstation ID: [id number]      XR ABDOMEN 1 VIEW   Final Result         1. Multiple dilated small bowel loops which are either new or increased   since the prior CT examination. Findings may be secondary to ileus or small   bowel obstruction. If there is clinical suspicion for bowel obstruction,   this can be evaluated with follow-up CT.               Electronically Signed by: KELLE RODRIGUEZ M.D.    Signed on: 12/10/2024 2:36 PM    Workstation ID: [id number]      XR CHEST AP OR PA   Final Result         1. Low lung volumes.      2. Small right pleural effusion with adjacent right lower lung atelectasis.      3. Multiple dilated small bowel loops partially visualized in the upper   abdomen. If there is suspicion for small bowel obstruction, this can be   evaluated with follow-up CT.      Electronically Signed by: KELLE RODRIGUEZ M.D.    Signed on: 12/10/2024 2:35 PM    Workstation ID: [id number]      CTA CHEST PULMONARY EMBOLISM   Final Result   1.  No pulmonary embolism to the proximal subsegmental level.   2.  Mild centrilobular emphysema.   3.  Secretions in the airways from aspiration or infection.   4.  Moderate sized hiatal hernia and esophageal wall thickening compatible   with esophagitis, with fluid in the esophagus placing the patient at risk   for aspiration.   5.  Small bilateral pleural effusions with adjacent probable atelectasis   are new since the prior exam.   6.  A few pulmonary nodules are likely infectious or inflammatory, and   follow-up chest CT scan with suggested interval of 1 year was recommended   on prior CT.   7.  Please see above for additional observations.       Electronically Signed by: CONSTANCE BENITEZ M.D.    Signed on: 12/9/2024 1:19 AM    Workstation ID: [id number]      CT  ABDOMEN PELVIS WO CONTRAST   Final Result   1.  Marked distention of the bladder with small amount of adjacent fat   stranding which can be seen with cystitis.   2.  Moderate bilateral hydroureteronephrosis likely related to the   distended bladder.   3.  Mass-like thickening of the distal descending/proximal sigmoid colon   with adjacent fat stranding.  While this can be seen with colitis,   correlation with colonoscopy is recommended, as this is suspicious for   tumor.   4.  Abnormal density extends anteroinferiorly at the left lower abdominal   cavity.  This is proximal to the inguinal canal and could be due to   infection, scarring, inflammation, or tumor.  Further assessment with   contrast-enhanced imaging is recommended.   5.  Nondilated small bowel loops with air-fluid levels and areas of wall   thickening can be seen with enteritis.   6.  Postsurgical changes of recent intramedullary maribell fixation of the right   intertrochanteric femur fracture with heterogeneous attenuation of the   lesser trochanter.  Close radiographic follow-up is recommended to ensure   stability.   7.  Please see above for additional observations.      Electronically Signed by: CONSTANCE BENITEZ M.D.    Signed on: 12/6/2024 8:46 PM    Workstation ID: [id number]       VASC LOWER EXTREMITY VENOUS DUPLEX RIGHT   Final Result   FINDINGS/IMPRESSION:         No evidence for right lower extremity DVT.      Electronically Signed by: IRMA SON M.D.    Signed on: 12/6/2024 7:25 PM    Workstation ID: [id number]      XR HIP 2 VIEWS RIGHT AND PELVIS   Final Result   1.  Bony demineralization.   2.  Postsurgical changes of intramedullary fixation of the right   intertrochanteric femur.   3.  Lucent/lytic appearance of the right lesser trochanter and   subtrochanteric femur suspicious for tumor.      Electronically Signed by: CONSTANCE BENITEZ M.D.    Signed on: 12/6/2024 6:24 PM    Workstation ID: [id number]      XR CHEST PA AND  LATERAL 2 VIEWS    (Results Pending)           TTE 12/14/24  1. Left ventricle: The cavity size is normal. Wall thickness is normal.     Systolic function is vigorous. The ejection fraction was measured by visual     estimation. The ejection fraction is 72%.  2. Mitral valve: There is trace regurgitation.  3. Right ventricle: The cavity size is at the upper limits of normal; images     are off axis. Wall thickness is normal. Systolic function is normal.  4. Tricuspid valve: There is mild regurgitation.  5. Pericardium, extracardiac: A prominent pericardial fat pad is present. A     small pericardial effusion is identified anterior to the heart.

## 2025-01-09 ENCOUNTER — CLINICAL DOCUMENTATION (OUTPATIENT)
Age: 80
End: 2025-01-09

## 2025-01-14 ENCOUNTER — TELEPHONE (OUTPATIENT)
Facility: HOSPITAL | Age: 80
End: 2025-01-14

## 2025-01-20 ENCOUNTER — TELEPHONE (OUTPATIENT)
Facility: CLINIC | Age: 80
End: 2025-01-20

## 2025-01-20 ENCOUNTER — TELEPHONE (OUTPATIENT)
Age: 80
End: 2025-01-20

## 2025-01-20 DIAGNOSIS — N18.32 STAGE 3B CHRONIC KIDNEY DISEASE (HCC): Primary | ICD-10-CM

## 2025-01-20 DIAGNOSIS — N18.30 STAGE 3 CHRONIC KIDNEY DISEASE, UNSPECIFIED WHETHER STAGE 3A OR 3B CKD (HCC): Primary | ICD-10-CM

## 2025-01-20 NOTE — TELEPHONE ENCOUNTER
Pt has a new patient appt on 1/22 with nephrology and their office is requesting they get lab work completed prior to their appt. They want to know if you are willing to place an order for a CMP for this patient? Please advise. Thank you.

## 2025-01-20 NOTE — TELEPHONE ENCOUNTER
Lab orders were entered for a CMP and faxed to [facility]. The patient was at [facility] to get the labs done that Dr. Maki would need for her appt. that is scheduled for 1/22/25.

## 2025-01-22 ENCOUNTER — OFFICE VISIT (OUTPATIENT)
Age: 80
End: 2025-01-22
Payer: MEDICARE

## 2025-01-22 VITALS
HEART RATE: 70 BPM | OXYGEN SATURATION: 96 % | BODY MASS INDEX: 26.49 KG/M2 | WEIGHT: 159 LBS | SYSTOLIC BLOOD PRESSURE: 150 MMHG | HEIGHT: 65 IN | DIASTOLIC BLOOD PRESSURE: 69 MMHG

## 2025-01-22 DIAGNOSIS — R60.0 LOCALIZED EDEMA: ICD-10-CM

## 2025-01-22 DIAGNOSIS — I50.810 RIGHT-SIDED CONGESTIVE HEART FAILURE, UNSPECIFIED HF CHRONICITY (HCC): Primary | ICD-10-CM

## 2025-01-22 DIAGNOSIS — N18.31 STAGE 3A CHRONIC KIDNEY DISEASE (HCC): ICD-10-CM

## 2025-01-22 PROCEDURE — 1036F TOBACCO NON-USER: CPT | Performed by: INTERNAL MEDICINE

## 2025-01-22 PROCEDURE — G8427 DOCREV CUR MEDS BY ELIG CLIN: HCPCS | Performed by: INTERNAL MEDICINE

## 2025-01-22 PROCEDURE — 1159F MED LIST DOCD IN RCRD: CPT | Performed by: INTERNAL MEDICINE

## 2025-01-22 PROCEDURE — G8419 CALC BMI OUT NRM PARAM NOF/U: HCPCS | Performed by: INTERNAL MEDICINE

## 2025-01-22 PROCEDURE — 1123F ACP DISCUSS/DSCN MKR DOCD: CPT | Performed by: INTERNAL MEDICINE

## 2025-01-22 PROCEDURE — 1090F PRES/ABSN URINE INCON ASSESS: CPT | Performed by: INTERNAL MEDICINE

## 2025-01-22 PROCEDURE — G8399 PT W/DXA RESULTS DOCUMENT: HCPCS | Performed by: INTERNAL MEDICINE

## 2025-01-22 PROCEDURE — 99204 OFFICE O/P NEW MOD 45 MIN: CPT | Performed by: INTERNAL MEDICINE

## 2025-01-22 PROCEDURE — 1126F AMNT PAIN NOTED NONE PRSNT: CPT | Performed by: INTERNAL MEDICINE

## 2025-01-22 RX ORDER — FUROSEMIDE 40 MG/1
40 TABLET ORAL DAILY
Qty: 60 TABLET | Refills: 3 | Status: SHIPPED | OUTPATIENT
Start: 2025-01-22

## 2025-01-22 NOTE — PROGRESS NOTES
1. Have you been to the ER, urgent care clinic since your last visit?  Hospitalized since your last visit?No    2. Have you seen or consulted any other health care providers outside of the [facility] since your last visit?  Include any pap smears or colon screening. No    
10 MG tablet TAKE 1 TABLET BY MOUTH EVERY DAY IN THE MORNING 8/12/24  Yes Dari Diaz MD   alendronate (FOSAMAX) 35 MG tablet TAKE 1 TABLET BY MOUTH EVERY SEVEN (7) DAYS. 4/17/24  Yes Dari Diaz MD   calcium carb-cholecalciferol 600-10 MG-MCG TABS per tab Take 1 tablet by mouth daily 9/9/22  Yes Automatic Reconciliation, Ar       REVIEW OF SYSTEMS:       Constitutional: Negative for chills and fever.   HENT: Negative.    Eyes: Negative.    Respiratory: Negative.    Cardiovascular: Negative.    Gastrointestinal: Negative for abdominal pain and nausea.   Skin: Negative.    Neurological: Negative.      Objective:   VITALS:    BP (!) 150/69   Pulse 70   Ht 1.651 m (5' 5\")   Wt 72.1 kg (159 lb)   SpO2 96%   BMI 26.46 kg/m²   No data recorded.      PHYSICAL EXAM:     General: NAD, alert, cooperative  Eyes: sclera anicteric  Oral Cavity: No thrush or ulcers  Neck: no JVD  Chest: Fair bilateral air entry.No Wheezing or Rhonchi. No rales.  Heart: normal sounds  Abdomen: soft and non tender   : no kinney  Lower Extremities: 2+ pitting edema both legs still her knees  Skin: no rash  Neuro: intact, no focals  Psychiatric: non-depressed          LAB DATA REVIEWED:      Comprehensive Metabolic Panel  Order: [id number]  Component  Ref Range & Units 1/20/25 1107   Potassium  3.5 - 5.5 mmol/L 4.2   Sodium  133 - 145 mmol/L 143   Chloride  98 - 110 mmol/L 108   Glucose  70 - 99 mg/dL 85   Calcium  8.4 - 10.5 mg/dL 9.3   Albumin  3.5 - 5.0 g/dL 3.9   SGPT (ALT)  5 - 40 U/L <5 Low    SGOT (AST)  10 - 37 U/L 17   Bilirubin Total  0.2 - 1.2 mg/dL 0.4   Alkaline Phosphatase  40 - 120 U/L 65   BUN  6 - 22 mg/dL 24 High    CO2  20 - 32 mmol/L 25   Creatinine  0.8 - 1.4 mg/dL 1.2   eGFR  >60.0 mL/min/1.73 sq.m. 44.6 Low    Comment: eGFR calculation based on the Chronic Kidney Disease Epidemiology Collaboration (CKD-EPI) equation refit without adjustment for race.     This eGFR is validated for stable chronic renal failure

## 2025-03-05 ENCOUNTER — OFFICE VISIT (OUTPATIENT)
Facility: CLINIC | Age: 80
End: 2025-03-05

## 2025-03-05 VITALS
TEMPERATURE: 98 F | RESPIRATION RATE: 20 BRPM | BODY MASS INDEX: 23.49 KG/M2 | HEIGHT: 65 IN | WEIGHT: 141 LBS | OXYGEN SATURATION: 98 % | HEART RATE: 84 BPM | DIASTOLIC BLOOD PRESSURE: 65 MMHG | SYSTOLIC BLOOD PRESSURE: 110 MMHG

## 2025-03-05 DIAGNOSIS — G20.A1 PARKINSON'S DISEASE, UNSPECIFIED WHETHER DYSKINESIA PRESENT, UNSPECIFIED WHETHER MANIFESTATIONS FLUCTUATE (HCC): ICD-10-CM

## 2025-03-05 DIAGNOSIS — E78.5 HYPERLIPIDEMIA, UNSPECIFIED HYPERLIPIDEMIA TYPE: ICD-10-CM

## 2025-03-05 DIAGNOSIS — M81.0 AGE-RELATED OSTEOPOROSIS WITHOUT CURRENT PATHOLOGICAL FRACTURE: ICD-10-CM

## 2025-03-05 DIAGNOSIS — R56.9 SEIZURE (HCC): Primary | ICD-10-CM

## 2025-03-05 DIAGNOSIS — R35.0 URINARY FREQUENCY: ICD-10-CM

## 2025-03-05 DIAGNOSIS — F33.1 MAJOR DEPRESSIVE DISORDER, RECURRENT, MODERATE (HCC): ICD-10-CM

## 2025-03-05 DIAGNOSIS — N18.32 STAGE 3B CHRONIC KIDNEY DISEASE (HCC): ICD-10-CM

## 2025-03-05 DIAGNOSIS — E07.9 THYROID DISORDER: ICD-10-CM

## 2025-03-05 LAB
BILIRUBIN, URINE, POC: NORMAL
BLOOD URINE, POC: NEGATIVE
GLUCOSE URINE, POC: NEGATIVE
KETONES, URINE, POC: NEGATIVE
LEUKOCYTE ESTERASE, URINE, POC: NEGATIVE
NITRITE, URINE, POC: NEGATIVE
PH, URINE, POC: 5.5 (ref 4.6–8)
PROTEIN,URINE, POC: NORMAL
SPECIFIC GRAVITY, URINE, POC: 1.03 (ref 1–1.03)
URINALYSIS CLARITY, POC: CLEAR
URINALYSIS COLOR, POC: YELLOW
UROBILINOGEN, POC: NORMAL

## 2025-03-05 RX ORDER — CARBIDOPA AND LEVODOPA 25; 100 MG/1; MG/1
TABLET ORAL
Qty: 540 TABLET | Refills: 1 | Status: SHIPPED | OUTPATIENT
Start: 2025-03-05

## 2025-03-05 RX ORDER — ALENDRONATE SODIUM 35 MG/1
TABLET ORAL
Qty: 13 TABLET | Refills: 3 | Status: SHIPPED | OUTPATIENT
Start: 2025-03-05

## 2025-03-05 SDOH — ECONOMIC STABILITY: FOOD INSECURITY: WITHIN THE PAST 12 MONTHS, YOU WORRIED THAT YOUR FOOD WOULD RUN OUT BEFORE YOU GOT MONEY TO BUY MORE.: NEVER TRUE

## 2025-03-05 SDOH — ECONOMIC STABILITY: FOOD INSECURITY: WITHIN THE PAST 12 MONTHS, THE FOOD YOU BOUGHT JUST DIDN'T LAST AND YOU DIDN'T HAVE MONEY TO GET MORE.: NEVER TRUE

## 2025-03-05 ASSESSMENT — PATIENT HEALTH QUESTIONNAIRE - PHQ9
SUM OF ALL RESPONSES TO PHQ QUESTIONS 1-9: 5
7. TROUBLE CONCENTRATING ON THINGS, SUCH AS READING THE NEWSPAPER OR WATCHING TELEVISION: NOT AT ALL
9. THOUGHTS THAT YOU WOULD BE BETTER OFF DEAD, OR OF HURTING YOURSELF: NOT AT ALL
8. MOVING OR SPEAKING SO SLOWLY THAT OTHER PEOPLE COULD HAVE NOTICED. OR THE OPPOSITE, BEING SO FIGETY OR RESTLESS THAT YOU HAVE BEEN MOVING AROUND A LOT MORE THAN USUAL: NOT AT ALL
1. LITTLE INTEREST OR PLEASURE IN DOING THINGS: SEVERAL DAYS
2. FEELING DOWN, DEPRESSED OR HOPELESS: SEVERAL DAYS
5. POOR APPETITE OR OVEREATING: SEVERAL DAYS
SUM OF ALL RESPONSES TO PHQ QUESTIONS 1-9: 5
4. FEELING TIRED OR HAVING LITTLE ENERGY: SEVERAL DAYS
3. TROUBLE FALLING OR STAYING ASLEEP: SEVERAL DAYS
10. IF YOU CHECKED OFF ANY PROBLEMS, HOW DIFFICULT HAVE THESE PROBLEMS MADE IT FOR YOU TO DO YOUR WORK, TAKE CARE OF THINGS AT HOME, OR GET ALONG WITH OTHER PEOPLE: SOMEWHAT DIFFICULT
6. FEELING BAD ABOUT YOURSELF - OR THAT YOU ARE A FAILURE OR HAVE LET YOURSELF OR YOUR FAMILY DOWN: NOT AT ALL

## 2025-03-05 NOTE — PROGRESS NOTES
\"Have you been to the ER, urgent care clinic since your last visit?  Hospitalized since your last visit?\"    YES - When: approximately 3 months ago.  Where and Why: [facility].    “Have you seen or consulted any other health care providers outside our system since your last visit?”    NO           
the lower extremity edema.  Patient will continue with the current treatment plan.  She is only taken the medication as needed.  Osteoporosis: Patient is on alendronate.  Continue current treatment plan.      Past Medical History  Past Medical History:   Diagnosis Date    Hypertension     Parkinson disease (HCC)        Surgical History  Past Surgical History:   Procedure Laterality Date    CATARACT REMOVAL      COLONOSCOPY  2019    HYSTERECTOMY (CERVIX STATUS UNKNOWN)  1990    SMALL INTESTINE SURGERY  2008        Medications  Current Outpatient Medications   Medication Sig Dispense Refill    furosemide (LASIX) 40 MG tablet Take 1 tablet by mouth daily (Patient taking differently: Take 1 tablet by mouth daily Not taking as directed) 60 tablet 3    QUEtiapine (SEROQUEL) 50 MG tablet Take 1 tablet by mouth nightly (Patient taking differently: Take 1 tablet by mouth nightly Patient not taking as directed) 90 tablet 1    mirtazapine (REMERON) 15 MG tablet Take 1 tablet by mouth nightly (Patient taking differently: Take 1 tablet by mouth nightly Not taking as directed) 90 tablet 1    carbidopa-levodopa (SINEMET)  MG per tablet TAKE 2 TABLETS BY MOUTH 3 TIMES A  tablet 1    atorvastatin (LIPITOR) 10 MG tablet TAKE 1 TABLET BY MOUTH EVERY DAY IN THE MORNING 90 tablet 1    alendronate (FOSAMAX) 35 MG tablet TAKE 1 TABLET BY MOUTH EVERY SEVEN (7) DAYS. 13 tablet 3    calcium carb-cholecalciferol 600-10 MG-MCG TABS per tab Take 1 tablet by mouth daily       No current facility-administered medications for this visit.       Allergies  No Known Allergies    Family History  History reviewed. No pertinent family history.    Social History  Social History     Socioeconomic History    Marital status:      Spouse name: Not on file    Number of children: Not on file    Years of education: Not on file    Highest education level: Not on file   Occupational History    Not on file   Tobacco Use    Smoking status: Never

## 2025-04-16 ENCOUNTER — OFFICE VISIT (OUTPATIENT)
Facility: CLINIC | Age: 80
End: 2025-04-16

## 2025-04-16 VITALS
HEIGHT: 65 IN | OXYGEN SATURATION: 100 % | WEIGHT: 137 LBS | BODY MASS INDEX: 22.82 KG/M2 | DIASTOLIC BLOOD PRESSURE: 78 MMHG | RESPIRATION RATE: 20 BRPM | TEMPERATURE: 98 F | HEART RATE: 78 BPM | SYSTOLIC BLOOD PRESSURE: 130 MMHG

## 2025-04-16 DIAGNOSIS — G25.81 RLS (RESTLESS LEGS SYNDROME): ICD-10-CM

## 2025-04-16 DIAGNOSIS — E78.49 OTHER HYPERLIPIDEMIA: ICD-10-CM

## 2025-04-16 DIAGNOSIS — G20.A1 PARKINSON'S DISEASE, UNSPECIFIED WHETHER DYSKINESIA PRESENT, UNSPECIFIED WHETHER MANIFESTATIONS FLUCTUATE (HCC): ICD-10-CM

## 2025-04-16 DIAGNOSIS — N18.32 STAGE 3B CHRONIC KIDNEY DISEASE (HCC): ICD-10-CM

## 2025-04-16 DIAGNOSIS — F39 MOOD DISORDER: ICD-10-CM

## 2025-04-16 DIAGNOSIS — R56.9 SEIZURE (HCC): ICD-10-CM

## 2025-04-16 DIAGNOSIS — R19.4 CHANGE IN BOWEL HABIT: Primary | ICD-10-CM

## 2025-04-16 DIAGNOSIS — E78.5 HYPERLIPIDEMIA, UNSPECIFIED HYPERLIPIDEMIA TYPE: ICD-10-CM

## 2025-04-16 DIAGNOSIS — R63.4 WEIGHT LOSS: ICD-10-CM

## 2025-04-16 RX ORDER — ATORVASTATIN CALCIUM 10 MG/1
10 TABLET, FILM COATED ORAL EVERY MORNING
Qty: 90 TABLET | Refills: 1 | Status: SHIPPED | OUTPATIENT
Start: 2025-04-16

## 2025-04-16 NOTE — PROGRESS NOTES
[address]ULIA [address] comes in for follow up care.  Weight loss: Patient has had profound weight loss.  She has lost more than 20 pounds with the beginning of this year.  Has also noted change in her bowel habits.  States that she has constipation.  Denies blood in the stool.  Denies abdominal distention, night sweats, fever or chills.  I will refer patient to see a gastroenterologist given profound weight loss and change in bowel habits.  May need to get a colonoscopy done.  Most recent lab tests have been unremarkable.  Parkinson's disease: Patient has Parkinson's disease.  She has been seen by the neurologist in the past.  She has an appointment for follow-up care.  She is on Sinemet.  She should take her medications as prescribed.  Patient states that when she takes the Sinemet it helps with some of her symptoms.  She has occasional shuffling gait, poor balance and tremors.  Poor appetite: Patient states that she has a poor appetite.  We had prescribed Remeron but patient declines taking the medication.  Feels that she is stable at the moment.  States that she eats what she wants when her appetite is good.  Denies abdominal pain, nausea or vomiting.  Patient is referred to the gastroenterologist for evaluation.  Seizure disorder: Patient has seizure activities.  She was seen in the emergency room previously due to seizures.  I ordered a head CT scan but this is yet to be done.  This was due to seizures and persistent headaches.  She is due to see the neurologist next month.  Has not had any seizures since her last visit.    Dyslipidemia: Patient has dyslipidemia.  She is on atorvastatin 10 mg daily.  She will continue to take a diet low in polysaturated fats.  I will send in a refill of medication.  RLS: Patient has noted restless legs.  Occasionally her legs kick out especially at night.  She states this is infrequent.  1 or 2 times a week.  Discussed restless leg syndrome.  She does not want any medication at

## 2025-05-01 ENCOUNTER — OFFICE VISIT (OUTPATIENT)
Age: 80
End: 2025-05-01
Payer: MEDICARE

## 2025-05-01 VITALS
SYSTOLIC BLOOD PRESSURE: 106 MMHG | TEMPERATURE: 97 F | WEIGHT: 136.2 LBS | OXYGEN SATURATION: 98 % | DIASTOLIC BLOOD PRESSURE: 58 MMHG | BODY MASS INDEX: 22.66 KG/M2 | HEART RATE: 87 BPM | RESPIRATION RATE: 18 BRPM

## 2025-05-01 DIAGNOSIS — R91.1 LUNG NODULE: Primary | ICD-10-CM

## 2025-05-01 DIAGNOSIS — G20.B2 PARKINSON'S DISEASE WITH DYSKINESIA, WITH FLUCTUATIONS (HCC): ICD-10-CM

## 2025-05-01 PROCEDURE — G8420 CALC BMI NORM PARAMETERS: HCPCS | Performed by: INTERNAL MEDICINE

## 2025-05-01 PROCEDURE — 1159F MED LIST DOCD IN RCRD: CPT | Performed by: INTERNAL MEDICINE

## 2025-05-01 PROCEDURE — 99204 OFFICE O/P NEW MOD 45 MIN: CPT | Performed by: INTERNAL MEDICINE

## 2025-05-01 PROCEDURE — 1126F AMNT PAIN NOTED NONE PRSNT: CPT | Performed by: INTERNAL MEDICINE

## 2025-05-01 PROCEDURE — 1036F TOBACCO NON-USER: CPT | Performed by: INTERNAL MEDICINE

## 2025-05-01 PROCEDURE — G8399 PT W/DXA RESULTS DOCUMENT: HCPCS | Performed by: INTERNAL MEDICINE

## 2025-05-01 PROCEDURE — 1123F ACP DISCUSS/DSCN MKR DOCD: CPT | Performed by: INTERNAL MEDICINE

## 2025-05-01 PROCEDURE — 1090F PRES/ABSN URINE INCON ASSESS: CPT | Performed by: INTERNAL MEDICINE

## 2025-05-01 PROCEDURE — G8427 DOCREV CUR MEDS BY ELIG CLIN: HCPCS | Performed by: INTERNAL MEDICINE

## 2025-05-01 PROCEDURE — 1160F RVW MEDS BY RX/DR IN RCRD: CPT | Performed by: INTERNAL MEDICINE

## 2025-05-01 RX ORDER — QUETIAPINE FUMARATE 25 MG/1
TABLET, FILM COATED ORAL
COMMUNITY
Start: 2025-04-22

## 2025-05-01 ASSESSMENT — ENCOUNTER SYMPTOMS
VOMITING: 0
EYE ITCHING: 0
BACK PAIN: 0
CHOKING: 0
APNEA: 0
SORE THROAT: 0
BLOOD IN STOOL: 0
SHORTNESS OF BREATH: 0
PHOTOPHOBIA: 0
COLOR CHANGE: 0
EYE DISCHARGE: 0
EYE PAIN: 0
SINUS PAIN: 0
EYE REDNESS: 0
CHEST TIGHTNESS: 0
NAUSEA: 0
SINUS PRESSURE: 0
ABDOMINAL PAIN: 0
STRIDOR: 0
RHINORRHEA: 0
WHEEZING: 0

## 2025-05-01 NOTE — PROGRESS NOTES
has been update per patient.        
right and left kidney many of which are simple cysts.  There are several that are too small to further characterize and are also  hyperdense and therefore indeterminate. In the setting of hematuria, would  recommend additional evaluation with MRI abdomen renal protocol for further  characterization.    3. 4 mm lingular nodule nonspecific.      [employer] RECOMMENDATIONS:    1.  LOW SMOKING OR OTHER EXPOSURE RISK:  Less than or = 4mm:  No follow-up necessary.    2.  HIGH SMOKING OR OTHER EXPOSURE RISK:  Less than or = 4mm:  Follow-up CT at 12 months; if stable, no further  follow-up.      Electronically signed by Katlyn Acosta            An electronic signature was used to authenticate this note.    --Neisha Theodore MD

## 2025-06-23 ENCOUNTER — OFFICE VISIT (OUTPATIENT)
Facility: CLINIC | Age: 80
End: 2025-06-23

## 2025-06-23 DIAGNOSIS — E78.5 HYPERLIPIDEMIA, UNSPECIFIED HYPERLIPIDEMIA TYPE: ICD-10-CM

## 2025-06-23 DIAGNOSIS — R22.0 SWELLING OF MANDIBLE: Primary | ICD-10-CM

## 2025-06-23 DIAGNOSIS — R60.0 PEDAL EDEMA: ICD-10-CM

## 2025-06-23 DIAGNOSIS — E07.9 THYROID DISORDER: ICD-10-CM

## 2025-06-23 DIAGNOSIS — G20.A1 PARKINSON'S DISEASE, UNSPECIFIED WHETHER DYSKINESIA PRESENT, UNSPECIFIED WHETHER MANIFESTATIONS FLUCTUATE (HCC): ICD-10-CM

## 2025-06-23 DIAGNOSIS — G25.81 RLS (RESTLESS LEGS SYNDROME): ICD-10-CM

## 2025-06-23 DIAGNOSIS — F39 MOOD DISORDER: ICD-10-CM

## 2025-06-23 DIAGNOSIS — N18.32 STAGE 3B CHRONIC KIDNEY DISEASE (HCC): ICD-10-CM

## 2025-06-24 VITALS
HEIGHT: 65 IN | RESPIRATION RATE: 20 BRPM | BODY MASS INDEX: 22.66 KG/M2 | SYSTOLIC BLOOD PRESSURE: 97 MMHG | HEART RATE: 82 BPM | DIASTOLIC BLOOD PRESSURE: 58 MMHG | OXYGEN SATURATION: 99 % | TEMPERATURE: 99.3 F

## 2025-06-24 NOTE — PROGRESS NOTES
Have you been to the ER, urgent care clinic since your last visit?  Hospitalized since your last visit?   NO    Have you seen or consulted any other health care providers outside our system since your last visit?   NO           
nephrologist.  Physical debility: Patient has generalized weakness.  She has trouble with ambulation due to the Parkinson's disease.  She is currently on a wheelchair.  She will continue with supportive care measures.  May need to consider getting physical therapy done for strengthening.  I will follow-up once I get her other results on this.    Past Medical History  Past Medical History:   Diagnosis Date    Hypertension     Parkinson disease (HCC)        Surgical History  Past Surgical History:   Procedure Laterality Date    CATARACT REMOVAL      COLONOSCOPY  2019    HYSTERECTOMY (CERVIX STATUS UNKNOWN)  1990    SMALL INTESTINE SURGERY  2008        Medications  Current Outpatient Medications   Medication Sig Dispense Refill    atorvastatin (LIPITOR) 10 MG tablet Take 1 tablet by mouth every morning 90 tablet 1    carbidopa-levodopa (SINEMET)  MG per tablet TAKE 2 TABLETS BY MOUTH 3 TIMES A  tablet 1    furosemide (LASIX) 40 MG tablet Take 1 tablet by mouth daily (Patient taking differently: Take 1 tablet by mouth daily Per daughter not taking as directed) 60 tablet 3    QUEtiapine (SEROQUEL) 25 MG tablet TAKE 1 TABLET BY MOUTH TWICE A DAY AS NEEDED FOR AGGRESSIVE BEHAVIOUR AS DIRECTED (Patient not taking: Reported on 6/24/2025)      alendronate (FOSAMAX) 35 MG tablet TAKE 1 TABLET BY MOUTH EVERY SEVEN (7) DAYS. 13 tablet 3    calcium carb-cholecalciferol 600-10 MG-MCG TABS per tab Take 1 tablet by mouth daily       No current facility-administered medications for this visit.       Allergies  No Known Allergies    Family History  History reviewed. No pertinent family history.    Social History  Social History     Socioeconomic History    Marital status:      Spouse name: Not on file    Number of children: Not on file    Years of education: Not on file    Highest education level: Not on file   Occupational History    Not on file   Tobacco Use    Smoking status: Never    Smokeless tobacco: Never

## 2025-07-16 ENCOUNTER — TELEMEDICINE (OUTPATIENT)
Facility: CLINIC | Age: 80
End: 2025-07-16

## 2025-07-16 DIAGNOSIS — S29.9XXA TRAUMATIC INJURY OF RIB: Primary | ICD-10-CM

## 2025-07-16 DIAGNOSIS — E78.5 HYPERLIPIDEMIA, UNSPECIFIED HYPERLIPIDEMIA TYPE: ICD-10-CM

## 2025-07-16 DIAGNOSIS — G20.A1 PARKINSON'S DISEASE, UNSPECIFIED WHETHER DYSKINESIA PRESENT, UNSPECIFIED WHETHER MANIFESTATIONS FLUCTUATE (HCC): ICD-10-CM

## 2025-07-16 NOTE — PROGRESS NOTES
2025    TELEHEALTH EVALUATION -- Audio/Visual    HPI:    Sharri St (:  1945) has requested an audio/video evaluation for the following concern(s):    Chest wall pain: Patient fell and bumped the left side of her chest wall.  Now has pain and bruising left chest wall.  She is able to taking deep breaths.  She is concerned however about injury to the ribs.  I will order x-ray of the left ribs and chest to evaluate for any rib fracture.  I will follow-up with the results.  Patient is taking Tylenol for pain as needed.  Denies dizziness, syncope, shortness of breath, wheeze or cough.  Dyslipidemia: Patient has dyslipidemia.  She is on atorvastatin 10 mg daily.  Continue current treatment plan.  Parkinson's disease, patient has Parkinson's disease.  She takes Sinemet.  Stable on medication.  Continue current treatment plan.    Review of Systems Review of all systems is negative except as noted above in the HPI.    Prior to Visit Medications    Medication Sig Taking? Authorizing Provider   atorvastatin (LIPITOR) 10 MG tablet Take 1 tablet by mouth every morning Yes Dari Diaz MD   carbidopa-levodopa (SINEMET)  MG per tablet TAKE 2 TABLETS BY MOUTH 3 TIMES A DAY Yes Dari Diaz MD   alendronate (FOSAMAX) 35 MG tablet TAKE 1 TABLET BY MOUTH EVERY SEVEN (7) DAYS. Yes Dari Diaz MD   furosemide (LASIX) 40 MG tablet Take 1 tablet by mouth daily  Patient taking differently: Take 1 tablet by mouth daily Per daughter not taking as directed Yes Lily Maki MD   calcium carb-cholecalciferol 600-10 MG-MCG TABS per tab Take 1 tablet by mouth daily Yes [employer]ciliation, Ar   QUEtiapine (SEROQUEL) 25 MG tablet TAKE 1 TABLET BY MOUTH TWICE A DAY AS NEEDED FOR AGGRESSIVE BEHAVIOUR AS DIRECTED  Patient not taking: Reported on 2025  ProviderRodolfo MD       Social History     Tobacco Use    Smoking status: Never    Smokeless tobacco: Never   Substance Use Topics    Alcohol

## 2025-07-29 ENCOUNTER — RESULTS FOLLOW-UP (OUTPATIENT)
Facility: CLINIC | Age: 80
End: 2025-07-29

## 2025-07-31 ENCOUNTER — OFFICE VISIT (OUTPATIENT)
Facility: CLINIC | Age: 80
End: 2025-07-31

## 2025-07-31 VITALS
OXYGEN SATURATION: 100 % | WEIGHT: 136 LBS | HEART RATE: 80 BPM | TEMPERATURE: 98.6 F | HEIGHT: 65 IN | BODY MASS INDEX: 22.66 KG/M2 | DIASTOLIC BLOOD PRESSURE: 44 MMHG | SYSTOLIC BLOOD PRESSURE: 96 MMHG

## 2025-07-31 DIAGNOSIS — G20.A1 PARKINSON'S DISEASE, UNSPECIFIED WHETHER DYSKINESIA PRESENT, UNSPECIFIED WHETHER MANIFESTATIONS FLUCTUATE (HCC): Primary | ICD-10-CM

## 2025-07-31 DIAGNOSIS — S29.9XXA TRAUMATIC INJURY OF RIB: ICD-10-CM

## 2025-07-31 DIAGNOSIS — R19.4 CHANGE IN BOWEL HABIT: ICD-10-CM

## 2025-07-31 DIAGNOSIS — N18.32 STAGE 3B CHRONIC KIDNEY DISEASE (HCC): ICD-10-CM

## 2025-07-31 DIAGNOSIS — R60.0 PEDAL EDEMA: ICD-10-CM

## 2025-07-31 DIAGNOSIS — F39 MOOD DISORDER: ICD-10-CM

## 2025-07-31 DIAGNOSIS — E78.5 HYPERLIPIDEMIA, UNSPECIFIED HYPERLIPIDEMIA TYPE: ICD-10-CM

## 2025-07-31 DIAGNOSIS — M81.0 AGE-RELATED OSTEOPOROSIS WITHOUT CURRENT PATHOLOGICAL FRACTURE: ICD-10-CM

## 2025-07-31 SDOH — ECONOMIC STABILITY: FOOD INSECURITY: WITHIN THE PAST 12 MONTHS, YOU WORRIED THAT YOUR FOOD WOULD RUN OUT BEFORE YOU GOT MONEY TO BUY MORE.: NEVER TRUE

## 2025-07-31 SDOH — ECONOMIC STABILITY: FOOD INSECURITY: WITHIN THE PAST 12 MONTHS, THE FOOD YOU BOUGHT JUST DIDN'T LAST AND YOU DIDN'T HAVE MONEY TO GET MORE.: NEVER TRUE

## 2025-07-31 ASSESSMENT — PATIENT HEALTH QUESTIONNAIRE - PHQ9
1. LITTLE INTEREST OR PLEASURE IN DOING THINGS: NEARLY EVERY DAY
7. TROUBLE CONCENTRATING ON THINGS, SUCH AS READING THE NEWSPAPER OR WATCHING TELEVISION: NEARLY EVERY DAY
9. THOUGHTS THAT YOU WOULD BE BETTER OFF DEAD, OR OF HURTING YOURSELF: NOT AT ALL
SUM OF ALL RESPONSES TO PHQ QUESTIONS 1-9: 21
SUM OF ALL RESPONSES TO PHQ QUESTIONS 1-9: 21
3. TROUBLE FALLING OR STAYING ASLEEP: NEARLY EVERY DAY
2. FEELING DOWN, DEPRESSED OR HOPELESS: NEARLY EVERY DAY
SUM OF ALL RESPONSES TO PHQ QUESTIONS 1-9: 21
6. FEELING BAD ABOUT YOURSELF - OR THAT YOU ARE A FAILURE OR HAVE LET YOURSELF OR YOUR FAMILY DOWN: NOT AT ALL
4. FEELING TIRED OR HAVING LITTLE ENERGY: NEARLY EVERY DAY
5. POOR APPETITE OR OVEREATING: NEARLY EVERY DAY
8. MOVING OR SPEAKING SO SLOWLY THAT OTHER PEOPLE COULD HAVE NOTICED. OR THE OPPOSITE, BEING SO FIGETY OR RESTLESS THAT YOU HAVE BEEN MOVING AROUND A LOT MORE THAN USUAL: NEARLY EVERY DAY
10. IF YOU CHECKED OFF ANY PROBLEMS, HOW DIFFICULT HAVE THESE PROBLEMS MADE IT FOR YOU TO DO YOUR WORK, TAKE CARE OF THINGS AT HOME, OR GET ALONG WITH OTHER PEOPLE: EXTREMELY DIFFICULT
SUM OF ALL RESPONSES TO PHQ QUESTIONS 1-9: 21

## 2025-07-31 ASSESSMENT — COLUMBIA-SUICIDE SEVERITY RATING SCALE - C-SSRS
6. HAVE YOU EVER DONE ANYTHING, STARTED TO DO ANYTHING, OR PREPARED TO DO ANYTHING TO END YOUR LIFE?: NO
1. WITHIN THE PAST MONTH, HAVE YOU WISHED YOU WERE DEAD OR WISHED YOU COULD GO TO SLEEP AND NOT WAKE UP?: NO
2. HAVE YOU ACTUALLY HAD ANY THOUGHTS OF KILLING YOURSELF?: NO

## 2025-07-31 NOTE — PROGRESS NOTES
[address]ULIA [address] comes in for follow up care.  Parkinson's disease: Patient has Parkinson's disease.  She has been followed up by the neurologist.  She is on Sinemet  mg.  She takes 2 tablets up to 3 times a day.  She gets episodes of bursts of energy and then sometimes weakness and muscle stiffness.  Currently she is feeling weak and stiff.  She is due to follow-up with the neurologist in September.  May need to have medications adjusted.  Discussed supportive care measures and activity as tolerated.  Poor appetite: Patient has poor appetite.  Previously he was on Remeron but stated that this caused her to have urinary frequency that has stopped the medication.  She will keep a weight log.  Daughter states that she will buy patient some Ensure.  Patient does not want any appetite stimulants.  She has been followed up by the gastroenterologist.  Denies abdominal pain, distention, nausea or vomiting.  Has noticed some constipation that comes on and off.  The gastroenterologist did not want to do any colonoscopy as patient in her current state will not be able to tolerate the prep or the procedure itself.  Patient denies any blood in stool.  Had a CT abdomen pelvis without contrast done previously that was unremarkable.  She has had a chest CT scan that was also unremarkable.  Mood disorder: Patient has mood disorder.  She is on Seroquel.  This helps with her mood swings and also helps with sleep.  Will continue with this medication.  Dyslipidemia: Patient has dyslipidemia.  She is on Lipitor 10 mg daily.  She will take a diet low in polysaturated fats.  Pedal edema: Patient has pedal edema.  She is on Lasix.  The swelling of the lower extremities has gone down.  We discussed taking the Lasix as needed.  Patient will continue supportive care.  She can elevate her lower extremities.  She can use compression stockings.  Blood pressure fluctuations: Patient has blood pressure fluctuations.  Currently her blood

## 2025-08-01 RX ORDER — FUROSEMIDE 40 MG/1
40 TABLET ORAL DAILY
Qty: 30 TABLET | Refills: 1 | Status: SHIPPED | OUTPATIENT
Start: 2025-08-01